# Patient Record
Sex: FEMALE | Race: WHITE | HISPANIC OR LATINO | Employment: FULL TIME | URBAN - METROPOLITAN AREA
[De-identification: names, ages, dates, MRNs, and addresses within clinical notes are randomized per-mention and may not be internally consistent; named-entity substitution may affect disease eponyms.]

---

## 2019-03-14 ENCOUNTER — ANNUAL EXAM (OUTPATIENT)
Dept: OBGYN CLINIC | Facility: CLINIC | Age: 59
End: 2019-03-14
Payer: COMMERCIAL

## 2019-03-14 VITALS
WEIGHT: 205 LBS | DIASTOLIC BLOOD PRESSURE: 74 MMHG | BODY MASS INDEX: 40.25 KG/M2 | HEIGHT: 60 IN | SYSTOLIC BLOOD PRESSURE: 126 MMHG

## 2019-03-14 DIAGNOSIS — Z12.31 ENCOUNTER FOR SCREENING MAMMOGRAM FOR MALIGNANT NEOPLASM OF BREAST: ICD-10-CM

## 2019-03-14 DIAGNOSIS — Z01.419 ENCNTR FOR GYN EXAM (GENERAL) (ROUTINE) W/O ABN FINDINGS: ICD-10-CM

## 2019-03-14 DIAGNOSIS — B36.9 FUNGAL DERMATITIS: Primary | ICD-10-CM

## 2019-03-14 PROBLEM — I10 HYPERTENSION: Status: ACTIVE | Noted: 2019-03-14

## 2019-03-14 PROBLEM — E11.9 DIABETES MELLITUS (HCC): Status: ACTIVE | Noted: 2019-03-14

## 2019-03-14 PROCEDURE — G0145 SCR C/V CYTO,THINLAYER,RESCR: HCPCS | Performed by: PHYSICIAN ASSISTANT

## 2019-03-14 PROCEDURE — 87624 HPV HI-RISK TYP POOLED RSLT: CPT | Performed by: PHYSICIAN ASSISTANT

## 2019-03-14 PROCEDURE — 99386 PREV VISIT NEW AGE 40-64: CPT | Performed by: PHYSICIAN ASSISTANT

## 2019-03-14 RX ORDER — AMLODIPINE BESYLATE 2.5 MG/1
2.5 TABLET ORAL DAILY
Refills: 0 | COMMUNITY
Start: 2018-12-18 | End: 2020-12-23 | Stop reason: SDUPTHER

## 2019-03-14 RX ORDER — LOSARTAN POTASSIUM AND HYDROCHLOROTHIAZIDE 25; 100 MG/1; MG/1
TABLET ORAL
COMMUNITY
End: 2020-09-09

## 2019-03-14 RX ORDER — NYSTATIN 100000 [USP'U]/G
POWDER TOPICAL 2 TIMES DAILY
Qty: 60 G | Refills: 0 | Status: SHIPPED | OUTPATIENT
Start: 2019-03-14 | End: 2020-03-18

## 2019-03-14 RX ORDER — NYSTATIN AND TRIAMCINOLONE ACETONIDE 100000; 1 [USP'U]/G; MG/G
OINTMENT TOPICAL 2 TIMES DAILY
Qty: 60 G | Refills: 3 | Status: SHIPPED | OUTPATIENT
Start: 2019-03-14 | End: 2019-03-14

## 2019-03-14 RX ORDER — NYSTATIN 100000 [USP'U]/G
POWDER TOPICAL 2 TIMES DAILY
Qty: 60 G | Refills: 3 | Status: SHIPPED | OUTPATIENT
Start: 2019-03-14 | End: 2019-03-14 | Stop reason: CLARIF

## 2019-03-14 RX ORDER — NYSTATIN AND TRIAMCINOLONE ACETONIDE 100000; 1 [USP'U]/G; MG/G
OINTMENT TOPICAL 2 TIMES DAILY
Qty: 60 G | Refills: 0 | Status: SHIPPED | OUTPATIENT
Start: 2019-03-14 | End: 2020-12-23

## 2019-03-14 RX ORDER — FLUCONAZOLE 150 MG/1
TABLET ORAL
Qty: 2 TABLET | Refills: 0 | Status: SHIPPED | OUTPATIENT
Start: 2019-03-14 | End: 2019-03-14

## 2019-03-14 RX ORDER — FLUCONAZOLE 150 MG/1
TABLET ORAL
Qty: 2 TABLET | Refills: 0 | Status: SHIPPED | OUTPATIENT
Start: 2019-03-14 | End: 2019-03-19

## 2019-03-14 NOTE — PROGRESS NOTES
Dominik Solis   1960    CC:  Yearly exam    S:  60 y o  female here for yearly exam  She is postmenopausal and has had no vaginal bleeding  She denies vaginal discharge, itching, odor or dryness  She is sexually active  She notes an itchy rash under her breasts and under her pannus and in her groin creases   She has diabetes and her numbers have been up lately     Last Pap 2/21/14 neg/neg  Last Mammo 9/30/16 neg  Last Colonoscopy 9 years ago       Current Outpatient Medications:     amLODIPine (NORVASC) 2 5 mg tablet, Take 2 5 mg by mouth daily, Disp: , Rfl: 0    cholecalciferol (VITAMIN D3) 1,000 units tablet, Take by mouth, Disp: , Rfl:     losartan-hydrochlorothiazide (HYZAAR) 100-25 MG per tablet, Take by mouth, Disp: , Rfl:     fluconazole (DIFLUCAN) 150 mg tablet, One po, repeat in 3 days, Disp: 2 tablet, Rfl: 0    nystatin (MYCOSTATIN) powder, Apply topically 2 (two) times a day, Disp: 60 g, Rfl: 3    nystatin-triamcinolone (MYCOLOG-II) ointment, Apply topically 2 (two) times a day, Disp: 60 g, Rfl: 3  Social History     Socioeconomic History    Marital status: /Civil Union     Spouse name: Not on file    Number of children: Not on file    Years of education: Not on file    Highest education level: Not on file   Occupational History    Not on file   Social Needs    Financial resource strain: Not on file    Food insecurity:     Worry: Not on file     Inability: Not on file    Transportation needs:     Medical: Not on file     Non-medical: Not on file   Tobacco Use    Smoking status: Never Smoker    Smokeless tobacco: Never Used   Substance and Sexual Activity    Alcohol use: Not Currently     Frequency: Never    Drug use: Never    Sexual activity: Yes     Partners: Male   Lifestyle    Physical activity:     Days per week: Not on file     Minutes per session: Not on file    Stress: Not on file   Relationships    Social connections:     Talks on phone: Not on file     Gets together: Not on file     Attends Yazidi service: Not on file     Active member of club or organization: Not on file     Attends meetings of clubs or organizations: Not on file     Relationship status: Not on file    Intimate partner violence:     Fear of current or ex partner: Not on file     Emotionally abused: Not on file     Physically abused: Not on file     Forced sexual activity: Not on file   Other Topics Concern    Not on file   Social History Narrative    Not on file     Family History   Problem Relation Age of Onset    Diabetes Mother      Past Medical History:   Diagnosis Date    [facility]     Hypertension         O:  Blood pressure 126/74, height 4' 11 84" (1 52 m), weight 93 kg (205 lb)  Patient appears well and is not in distress  Neck is supple without masses  Breasts are symmetrical without mass, tenderness, nipple discharge, skin changes or adenopathy  There is a fungal rash beneath both breasts  Abdomen is soft and nontender without masses  External genitals show a marked fungal rash of the vulva  Vagina is normal without discharge or bleeding  Cervix is normal without discharge or lesion  Uterus is normal, mobile, nontender without palpable mass  Adnexa are normal, nontender, without palpable mass  A:  Yearly exam  Fungal vulvitis  P:   Nystatin cream BID x 14 days til better, then switch to    Nystatin powder  Diflucan 150mg po x one dose,    repeat in 3 days  Call in 2 weeks if not resolved  Pap and HPV today     RTO one year for yearly exam or sooner as needed

## 2019-03-15 LAB
HPV HR 12 DNA CVX QL NAA+PROBE: NEGATIVE
HPV16 DNA CVX QL NAA+PROBE: NEGATIVE
HPV18 DNA CVX QL NAA+PROBE: NEGATIVE

## 2019-03-19 LAB
LAB AP GYN PRIMARY INTERPRETATION: NORMAL
Lab: NORMAL

## 2019-03-20 ENCOUNTER — TRANSCRIBE ORDERS (OUTPATIENT)
Dept: ADMISSIONS | Facility: HOSPITAL | Age: 59
End: 2019-03-20

## 2019-03-20 ENCOUNTER — HOSPITAL ENCOUNTER (OUTPATIENT)
Dept: RADIOLOGY | Facility: HOSPITAL | Age: 59
Discharge: HOME | End: 2019-03-20
Payer: COMMERCIAL

## 2019-03-20 VITALS — HEIGHT: 59 IN | BODY MASS INDEX: 41.33 KG/M2 | WEIGHT: 205 LBS

## 2019-03-20 DIAGNOSIS — Z12.31 ENCOUNTER FOR SCREENING MAMMOGRAM FOR MALIGNANT NEOPLASM OF BREAST: ICD-10-CM

## 2019-03-20 PROCEDURE — 77067 SCR MAMMO BI INCL CAD: CPT

## 2020-03-18 ENCOUNTER — ANNUAL EXAM (OUTPATIENT)
Dept: OBGYN CLINIC | Facility: CLINIC | Age: 60
End: 2020-03-18
Payer: COMMERCIAL

## 2020-03-18 VITALS
WEIGHT: 193 LBS | SYSTOLIC BLOOD PRESSURE: 124 MMHG | HEIGHT: 59 IN | DIASTOLIC BLOOD PRESSURE: 80 MMHG | BODY MASS INDEX: 38.91 KG/M2

## 2020-03-18 DIAGNOSIS — B36.9 FUNGAL DERMATITIS: Primary | ICD-10-CM

## 2020-03-18 DIAGNOSIS — Z01.419 ENCNTR FOR GYN EXAM (GENERAL) (ROUTINE) W/O ABN FINDINGS: ICD-10-CM

## 2020-03-18 DIAGNOSIS — Z12.31 ENCOUNTER FOR SCREENING MAMMOGRAM FOR MALIGNANT NEOPLASM OF BREAST: ICD-10-CM

## 2020-03-18 PROBLEM — J45.909 ASTHMA: Status: ACTIVE | Noted: 2020-03-18

## 2020-03-18 PROBLEM — J30.9 ALLERGIC RHINITIS: Status: ACTIVE | Noted: 2020-03-18

## 2020-03-18 PROBLEM — D56.9 THALASSEMIA: Status: ACTIVE | Noted: 2020-03-18

## 2020-03-18 PROBLEM — E78.5 HYPERLIPIDEMIA: Status: ACTIVE | Noted: 2020-03-18

## 2020-03-18 PROBLEM — E55.9 VITAMIN D DEFICIENCY: Status: ACTIVE | Noted: 2020-03-18

## 2020-03-18 PROBLEM — D86.9 SARCOIDOSIS: Status: ACTIVE | Noted: 2020-03-18

## 2020-03-18 PROCEDURE — 99396 PREV VISIT EST AGE 40-64: CPT | Performed by: PHYSICIAN ASSISTANT

## 2020-03-18 RX ORDER — EMPAGLIFLOZIN 10 MG/1
TABLET, FILM COATED ORAL
COMMUNITY
Start: 2020-03-02 | End: 2020-11-09

## 2020-03-18 RX ORDER — FLUCONAZOLE 150 MG/1
TABLET ORAL
Qty: 2 TABLET | Refills: 0 | Status: SHIPPED | OUTPATIENT
Start: 2020-03-18 | End: 2020-03-21

## 2020-03-18 RX ORDER — NYSTATIN 100000 [USP'U]/G
POWDER TOPICAL 3 TIMES DAILY
Qty: 60 G | Refills: 3 | Status: SHIPPED | OUTPATIENT
Start: 2020-03-18 | End: 2020-12-23

## 2020-03-18 RX ORDER — SITAGLIPTIN AND METFORMIN HYDROCHLORIDE 500; 50 MG/1; MG/1
TABLET, FILM COATED ORAL
COMMUNITY
Start: 2020-02-12 | End: 2020-12-23 | Stop reason: SDUPTHER

## 2020-03-18 NOTE — PROGRESS NOTES
Asmita Daniel   1960    CC:  Yearly exam    S:  60 y o  female here for yearly exam  She is postmenopausal and has had no vaginal bleeding  She denies vaginal discharge, itching, odor or dryness  She does report a rash in her groin  Sexual activity: She is sexually active without pain, bleeding or dryness  Last Pap: 3/14/19 neg/neg  Last Mammo:  3/20/19 neg  Last Colonoscopy:  2014 neg (repeat 2024)      We reviewed [facility] guidelines for Pap testing       Family hx of breast cancer: no  Family hx of ovarian cancer: no  Family hx of colon cancer: no    Current Outpatient Medications:     amLODIPine (NORVASC) 2 5 mg tablet, Take 2 5 mg by mouth daily, Disp: , Rfl: 0    cholecalciferol (VITAMIN D3) 1,000 units tablet, Take by mouth, Disp: , Rfl:     JANUMET  MG per tablet, , Disp: , Rfl:     JARDIANCE 10 MG TABS, , Disp: , Rfl:     losartan-hydrochlorothiazide (HYZAAR) 100-25 MG per tablet, Take by mouth, Disp: , Rfl:     nystatin (MYCOSTATIN) powder, Apply topically 2 (two) times a day, Disp: 60 g, Rfl: 0    nystatin-triamcinolone (MYCOLOG-II) ointment, Apply topically 2 (two) times a day, Disp: 60 g, Rfl: 0  Social History     Socioeconomic History    Marital status: /Civil Union     Spouse name: Not on file    Number of children: Not on file    Years of education: Not on file    Highest education level: Not on file   Occupational History    Not on file   Social Needs    Financial resource strain: Not on file    Food insecurity:     Worry: Not on file     Inability: Not on file    Transportation needs:     Medical: Not on file     Non-medical: Not on file   Tobacco Use    Smoking status: Never Smoker    Smokeless tobacco: Never Used   Substance and Sexual Activity    Alcohol use: Not Currently     Frequency: Never    Drug use: Never    Sexual activity: Yes     Partners: Male   Lifestyle    Physical activity:     Days per week: Not on file     Minutes per session: Not on in 3 days   Nystatin powder TID     RTO one year for yearly exam or sooner as needed

## 2020-04-03 NOTE — PROGRESS NOTES
Working over due bin, COVID-19  Mailed mammogram order to patient as a friendly reminder due, with the number to central scheduling, informing pt she may schedule out till June 2020

## 2020-09-09 DIAGNOSIS — I10 ESSENTIAL HYPERTENSION: Primary | ICD-10-CM

## 2020-09-09 RX ORDER — LOSARTAN POTASSIUM AND HYDROCHLOROTHIAZIDE 25; 100 MG/1; MG/1
TABLET ORAL
Qty: 90 TABLET | Refills: 0 | Status: SHIPPED | OUTPATIENT
Start: 2020-09-09 | End: 2020-12-23 | Stop reason: SDUPTHER

## 2020-09-10 ENCOUNTER — TELEMEDICINE (OUTPATIENT)
Dept: INTERNAL MEDICINE CLINIC | Facility: CLINIC | Age: 60
End: 2020-09-10
Payer: COMMERCIAL

## 2020-09-10 DIAGNOSIS — Z20.828 EXPOSURE TO SARS-ASSOCIATED CORONAVIRUS: Primary | ICD-10-CM

## 2020-09-10 PROCEDURE — 99442 PR PHYS/QHP TELEPHONE EVALUATION 11-20 MIN: CPT | Performed by: INTERNAL MEDICINE

## 2020-09-10 NOTE — PROGRESS NOTES
Virtual Brief Visit    Assessment/Plan:    Problem List Items Addressed This Visit        Other    Exposure to SARS-associated coronavirus - Primary                Reason for visit is   Chief Complaint   Patient presents with    Virtual Brief Visit        Encounter provider Nina Hilario MD    Provider located at [address] [address] [phone]    Recent Visits  No visits were found meeting these conditions  Showing recent visits within past 7 days and meeting all other requirements     Today's Visits  Date Type Provider Dept   09[facility]10/[facility] [address] [place] [address] today's visits and meeting all other requirements     Future Appointments  No visits were found meeting these conditions  Showing future appointments within next 150 days and meeting all other requirements        After connecting through telephone, the patient was identified by name and date of birth  Mateo Godinez was informed that this is a telemedicine visit and that the visit is being conducted through [facility] and patient was informed that this is a secure, HIPAA-compliant platform  She agrees to proceed     My office door was closed  No one else was in the room  She acknowledged consent and understanding of privacy and security of the platform  The patient has agreed to participate and understands she can discontinue the visit at any time  Patient is aware this is a billable service  Subjective    Mateo Godinez is a 63 y o  female     Exposed to COVID-19, got tested, waiting for report, she is asymptomatic       Past Medical History:   Diagnosis Date    Diabetes mellitus ([address] )     Hypertension        Past Surgical History:   Procedure Laterality Date    COLONOSCOPY         Current Outpatient Medications   Medication Sig Dispense Refill    amLODIPine (NORVASC) 2 5 mg tablet Take 2 5 mg by mouth daily  0    cholecalciferol (VITAMIN D3) 1,000 units tablet Take by mouth      JANUMET  MG per tablet       JARDIANCE 10 MG TABS       losartan-hydrochlorothiazide (HYZAAR) 100-25 MG per tablet TAKE ONE TABLET BY MOUTH DAILY 90 tablet 0    nystatin (MYCOSTATIN) powder Apply topically 3 (three) times a day 60 g 3    nystatin-triamcinolone (MYCOLOG-II) ointment Apply topically 2 (two) times a day 60 g 0     No current facility-administered medications for this visit  Allergies   Allergen Reactions    Sulfa Antibiotics        Review of Systems   Constitutional: Negative for chills and fever  HENT: Negative for congestion, ear pain and sore throat  Eyes: Negative for pain  Respiratory: Negative for cough and shortness of breath  Cardiovascular: Negative for chest pain and leg swelling  Gastrointestinal: Negative for abdominal pain, nausea and vomiting  Endocrine: Negative for polyuria  Genitourinary: Negative for difficulty urinating, frequency and urgency  Musculoskeletal: Negative for arthralgias and back pain  Skin: Negative for rash  Neurological: Negative for weakness and headaches  Psychiatric/Behavioral: Negative for sleep disturbance  The patient is not nervous/anxious  There were no vitals filed for this visit  I spent 15 minutes directly with the patient during this visit    VIRTUAL VISIT DISCLAIMER    True Bright acknowledges that she has consented to an online visit or consultation  She understands that the online visit is based solely on information provided by her, and that, in the absence of a face-to-face physical evaluation by the physician, the diagnosis she receives is both limited and provisional in terms of accuracy and completeness  This is not intended to replace a full medical face-to-face evaluation by the physician  True Bright understands and accepts these terms

## 2020-11-02 DIAGNOSIS — Z79.4 TYPE 2 DIABETES MELLITUS WITHOUT COMPLICATION, WITH LONG-TERM CURRENT USE OF INSULIN (HCC): Primary | ICD-10-CM

## 2020-11-02 DIAGNOSIS — E11.9 TYPE 2 DIABETES MELLITUS WITHOUT COMPLICATION, WITH LONG-TERM CURRENT USE OF INSULIN (HCC): Primary | ICD-10-CM

## 2020-11-04 ENCOUNTER — TELEPHONE (OUTPATIENT)
Dept: INTERNAL MEDICINE CLINIC | Facility: CLINIC | Age: 60
End: 2020-11-04

## 2020-11-09 RX ORDER — EMPAGLIFLOZIN 10 MG/1
TABLET, FILM COATED ORAL
Qty: 30 TABLET | Refills: 2 | Status: SHIPPED | OUTPATIENT
Start: 2020-11-09 | End: 2020-12-23 | Stop reason: SDUPTHER

## 2020-12-23 ENCOUNTER — OFFICE VISIT (OUTPATIENT)
Dept: INTERNAL MEDICINE CLINIC | Facility: CLINIC | Age: 60
End: 2020-12-23
Payer: COMMERCIAL

## 2020-12-23 VITALS
TEMPERATURE: 98.2 F | HEART RATE: 98 BPM | SYSTOLIC BLOOD PRESSURE: 122 MMHG | OXYGEN SATURATION: 98 % | HEIGHT: 58 IN | DIASTOLIC BLOOD PRESSURE: 90 MMHG | WEIGHT: 191 LBS | BODY MASS INDEX: 40.09 KG/M2

## 2020-12-23 DIAGNOSIS — B37.3 YEAST INFECTION INVOLVING THE VAGINA AND SURROUNDING AREA: ICD-10-CM

## 2020-12-23 DIAGNOSIS — Z11.59 NEED FOR HEPATITIS C SCREENING TEST: ICD-10-CM

## 2020-12-23 DIAGNOSIS — L30.9 ECZEMA, UNSPECIFIED TYPE: ICD-10-CM

## 2020-12-23 DIAGNOSIS — E11.9 TYPE 2 DIABETES MELLITUS WITHOUT COMPLICATION, WITH LONG-TERM CURRENT USE OF INSULIN (HCC): Primary | ICD-10-CM

## 2020-12-23 DIAGNOSIS — D86.9 SARCOIDOSIS: ICD-10-CM

## 2020-12-23 DIAGNOSIS — J30.9 ALLERGIC RHINITIS, UNSPECIFIED SEASONALITY, UNSPECIFIED TRIGGER: ICD-10-CM

## 2020-12-23 DIAGNOSIS — Z79.4 TYPE 2 DIABETES MELLITUS WITHOUT COMPLICATION, WITH LONG-TERM CURRENT USE OF INSULIN (HCC): Primary | ICD-10-CM

## 2020-12-23 DIAGNOSIS — I10 ESSENTIAL HYPERTENSION: ICD-10-CM

## 2020-12-23 DIAGNOSIS — J45.40 MODERATE PERSISTENT ASTHMA WITHOUT COMPLICATION: ICD-10-CM

## 2020-12-23 DIAGNOSIS — K59.09 OTHER CONSTIPATION: ICD-10-CM

## 2020-12-23 DIAGNOSIS — E55.9 VITAMIN D DEFICIENCY: ICD-10-CM

## 2020-12-23 DIAGNOSIS — Z23 NEED FOR TETANUS BOOSTER: ICD-10-CM

## 2020-12-23 PROCEDURE — 3074F SYST BP LT 130 MM HG: CPT | Performed by: INTERNAL MEDICINE

## 2020-12-23 PROCEDURE — 1036F TOBACCO NON-USER: CPT | Performed by: INTERNAL MEDICINE

## 2020-12-23 PROCEDURE — 90471 IMMUNIZATION ADMIN: CPT

## 2020-12-23 PROCEDURE — 3008F BODY MASS INDEX DOCD: CPT | Performed by: INTERNAL MEDICINE

## 2020-12-23 PROCEDURE — 90715 TDAP VACCINE 7 YRS/> IM: CPT

## 2020-12-23 PROCEDURE — 3080F DIAST BP >= 90 MM HG: CPT | Performed by: INTERNAL MEDICINE

## 2020-12-23 PROCEDURE — 99214 OFFICE O/P EST MOD 30 MIN: CPT | Performed by: INTERNAL MEDICINE

## 2020-12-23 RX ORDER — VERAPAMIL HYDROCHLORIDE 240 MG/1
240 CAPSULE, EXTENDED RELEASE ORAL
Qty: 30 CAPSULE | Refills: 3 | Status: SHIPPED | OUTPATIENT
Start: 2020-12-23 | End: 2021-03-30

## 2020-12-23 RX ORDER — AMLODIPINE BESYLATE 2.5 MG/1
2.5 TABLET ORAL DAILY
Qty: 30 TABLET | Refills: 2 | Status: SHIPPED | OUTPATIENT
Start: 2020-12-23 | End: 2021-04-21

## 2020-12-23 RX ORDER — EMPAGLIFLOZIN 10 MG/1
10 TABLET, FILM COATED ORAL EVERY MORNING
Qty: 30 TABLET | Refills: 2 | Status: SHIPPED | OUTPATIENT
Start: 2020-12-23 | End: 2021-03-02 | Stop reason: SDUPTHER

## 2020-12-23 RX ORDER — TRIAMCINOLONE ACETONIDE 1 MG/G
CREAM TOPICAL 2 TIMES DAILY
Qty: 30 G | Refills: 0 | Status: SHIPPED | OUTPATIENT
Start: 2020-12-23 | End: 2021-04-21

## 2020-12-23 RX ORDER — LOSARTAN POTASSIUM AND HYDROCHLOROTHIAZIDE 25; 100 MG/1; MG/1
1 TABLET ORAL DAILY
Qty: 90 TABLET | Refills: 1 | Status: SHIPPED | OUTPATIENT
Start: 2020-12-23 | End: 2021-08-12

## 2020-12-23 RX ORDER — SITAGLIPTIN AND METFORMIN HYDROCHLORIDE 500; 50 MG/1; MG/1
1 TABLET, FILM COATED ORAL 2 TIMES DAILY WITH MEALS
Qty: 30 TABLET | Refills: 3 | Status: SHIPPED | OUTPATIENT
Start: 2020-12-23 | End: 2021-03-30 | Stop reason: SDUPTHER

## 2020-12-23 RX ORDER — VERAPAMIL HYDROCHLORIDE 240 MG/1
240 CAPSULE, EXTENDED RELEASE ORAL
COMMUNITY
End: 2020-12-23 | Stop reason: SDUPTHER

## 2020-12-23 RX ORDER — CLOTRIMAZOLE 1 %
CREAM (GRAM) TOPICAL 2 TIMES DAILY
Qty: 30 G | Refills: 0 | Status: SHIPPED | OUTPATIENT
Start: 2020-12-23 | End: 2021-04-21

## 2021-03-02 DIAGNOSIS — E11.9 TYPE 2 DIABETES MELLITUS WITHOUT COMPLICATION, WITH LONG-TERM CURRENT USE OF INSULIN (HCC): ICD-10-CM

## 2021-03-02 DIAGNOSIS — Z79.4 TYPE 2 DIABETES MELLITUS WITHOUT COMPLICATION, WITH LONG-TERM CURRENT USE OF INSULIN (HCC): ICD-10-CM

## 2021-03-02 RX ORDER — EMPAGLIFLOZIN 10 MG/1
10 TABLET, FILM COATED ORAL EVERY MORNING
Qty: 30 TABLET | Refills: 2 | Status: SHIPPED | OUTPATIENT
Start: 2021-03-02 | End: 2021-03-30 | Stop reason: SDUPTHER

## 2021-03-16 ENCOUNTER — TELEPHONE (OUTPATIENT)
Dept: INTERNAL MEDICINE CLINIC | Facility: CLINIC | Age: 61
End: 2021-03-16

## 2021-03-16 NOTE — TELEPHONE ENCOUNTER
Pt's jardiance is not being covered, do you want to switch to something different or do prior Blanquita Colon

## 2021-03-16 NOTE — TELEPHONE ENCOUNTER
Pt's daughter would like to be called back once we received response,     Meghna Shepard [phone]

## 2021-03-30 ENCOUNTER — OFFICE VISIT (OUTPATIENT)
Dept: INTERNAL MEDICINE CLINIC | Facility: CLINIC | Age: 61
End: 2021-03-30
Payer: COMMERCIAL

## 2021-03-30 VITALS
TEMPERATURE: 97.7 F | DIASTOLIC BLOOD PRESSURE: 84 MMHG | SYSTOLIC BLOOD PRESSURE: 136 MMHG | WEIGHT: 188 LBS | HEART RATE: 92 BPM | BODY MASS INDEX: 39.47 KG/M2 | HEIGHT: 58 IN

## 2021-03-30 DIAGNOSIS — I83.813 VARICOSE VEINS OF BOTH LOWER EXTREMITIES WITH PAIN: ICD-10-CM

## 2021-03-30 DIAGNOSIS — E55.9 VITAMIN D DEFICIENCY: ICD-10-CM

## 2021-03-30 DIAGNOSIS — Z12.31 ENCOUNTER FOR SCREENING MAMMOGRAM FOR MALIGNANT NEOPLASM OF BREAST: ICD-10-CM

## 2021-03-30 DIAGNOSIS — Z12.11 ENCOUNTER FOR SCREENING COLONOSCOPY: ICD-10-CM

## 2021-03-30 DIAGNOSIS — E11.9 TYPE 2 DIABETES MELLITUS WITHOUT COMPLICATION, WITH LONG-TERM CURRENT USE OF INSULIN (HCC): ICD-10-CM

## 2021-03-30 DIAGNOSIS — D86.9 SARCOIDOSIS: ICD-10-CM

## 2021-03-30 DIAGNOSIS — J45.40 MODERATE PERSISTENT ASTHMA WITHOUT COMPLICATION: ICD-10-CM

## 2021-03-30 DIAGNOSIS — Z79.4 TYPE 2 DIABETES MELLITUS WITHOUT COMPLICATION, WITH LONG-TERM CURRENT USE OF INSULIN (HCC): ICD-10-CM

## 2021-03-30 DIAGNOSIS — E11.65 UNCONTROLLED TYPE 2 DIABETES MELLITUS WITH HYPERGLYCEMIA (HCC): Primary | ICD-10-CM

## 2021-03-30 DIAGNOSIS — E78.2 MIXED HYPERLIPIDEMIA: ICD-10-CM

## 2021-03-30 DIAGNOSIS — I10 ESSENTIAL HYPERTENSION, BENIGN: ICD-10-CM

## 2021-03-30 PROCEDURE — 99214 OFFICE O/P EST MOD 30 MIN: CPT | Performed by: INTERNAL MEDICINE

## 2021-03-30 RX ORDER — EMPAGLIFLOZIN 10 MG/1
10 TABLET, FILM COATED ORAL EVERY MORNING
Qty: 30 TABLET | Refills: 2 | Status: SHIPPED | OUTPATIENT
Start: 2021-03-30 | End: 2021-09-29 | Stop reason: SDUPTHER

## 2021-03-30 RX ORDER — VERAPAMIL HYDROCHLORIDE 240 MG/1
240 CAPSULE, EXTENDED RELEASE ORAL
COMMUNITY
End: 2021-09-29 | Stop reason: SDUPTHER

## 2021-03-30 RX ORDER — SITAGLIPTIN AND METFORMIN HYDROCHLORIDE 500; 50 MG/1; MG/1
1 TABLET, FILM COATED ORAL 2 TIMES DAILY WITH MEALS
Qty: 30 TABLET | Refills: 3 | Status: SHIPPED | OUTPATIENT
Start: 2021-03-30 | End: 2021-04-07 | Stop reason: SDUPTHER

## 2021-03-30 NOTE — PROGRESS NOTES
Assessment/Plan:    BMI Counseling: Body mass index is 39 29 kg/m²  The BMI is above normal  Nutrition recommendations include decreasing portion sizes, encouraging healthy choices of fruits and vegetables and decreasing fast food intake  Exercise recommendations include moderate physical activity 150 minutes/week  1  Uncontrolled type 2 diabetes mellitus with hyperglycemia ([facility] )  -     Ambulatory referral to Weight Management; Future  -     CBC and differential; Future  -     Hemoglobin A1C; Future  -     Comprehensive metabolic panel; Future  -     Lipid Panel with Direct LDL reflex; Future  -     Microalbumin / creatinine urine ratio  -     TSH, 3rd generation; Future    2  Encounter for screening mammogram for malignant neoplasm of breast  -     Mammo screening bilateral w 3d & cad; Future; Expected date: 03/30/2021    3  Encounter for screening colonoscopy  -     Ambulatory referral for colonoscopy; Future    4  Type 2 diabetes mellitus without complication, with long-term current use of insulin ([facility] )  -     Ambulatory referral to Ophthalmology; Future  -     Empagliflozin (Jardiance) 10 MG TABS; Take 1 tablet (10 mg total) by mouth every morning  -     Janumet  MG per tablet; Take 1 tablet by mouth 2 (two) times a day with meals    5  Mixed hyperlipidemia    6  Sarcoidosis    7  Moderate persistent asthma without complication    8  Vitamin D deficiency    9  Varicose veins of both lower extremities with pain  -     Ambulatory referral to Vascular Surgery; Future    10  Body mass index 39 0-39 9, adult  -     Ambulatory referral to Weight Management; Future    11  Essential hypertension, benign  -     Ambulatory referral to Weight Management; Future           Subjective:      Patient ID: Jong Taylor is a 63 y o  female      Follow-up on multiple medical problems to ensure they are stable on current medication      The following portions of the patient's history were reviewed and updated as appropriate: She  has a past medical history of Diabetes mellitus ([facility] ), Hypercholesterolemia, Hypertension, Rheumatoid arthritis ([facility] ), and Sarcoidosis  She   Patient Active Problem List    Diagnosis Date Noted    Varicose veins of both lower extremities with pain 03/30/2021    Body mass index 39 0-39 9, adult 03/30/2021    Uncontrolled type 2 diabetes mellitus with hyperglycemia ([facility] ) 03/30/2021    Encounter for screening mammogram for malignant neoplasm of breast 03/30/2021    Encounter for screening colonoscopy 03/30/2021    Moderate persistent asthma without complication 68/64/8623    Exposure to SARS-associated coronavirus 09/10/2020    Allergic rhinitis 03/18/2020    Asthma 03/18/2020    Hyperlipidemia 03/18/2020    Sarcoidosis 03/18/2020    Thalassemia 03/18/2020    Vitamin D deficiency 03/18/2020    Diabetes mellitus ([facility] ) 03/14/2019    Hypertension 03/14/2019     She  has a past surgical history that includes Colonoscopy (05/15/2009); Cholecystectomy; and Mammo (historical) (09/30/2016)  Her family history includes Coronary artery disease in her father; Diabetes in her maternal grandmother and mother; Stomach cancer in her maternal grandfather  She  reports that she has never smoked  She has never used smokeless tobacco  She reports previous alcohol use  She reports that she does not use drugs    Current Outpatient Medications   Medication Sig Dispense Refill    amLODIPine (NORVASC) 2 5 mg tablet Take 1 tablet (2 5 mg total) by mouth daily 30 tablet 2    Empagliflozin (Jardiance) 10 MG TABS Take 1 tablet (10 mg total) by mouth every morning 30 tablet 2    Janumet  MG per tablet Take 1 tablet by mouth 2 (two) times a day with meals 30 tablet 3    losartan-hydrochlorothiazide (HYZAAR) 100-25 MG per tablet Take 1 tablet by mouth daily 90 tablet 1    verapamil (VERELAN) 240 MG 24 hr capsule Take 240 mg by mouth daily at bedtime      clotrimazole (LOTRIMIN) 1 % cream Apply topically 2 (two) times a day (Patient not taking: Reported on 3/30/2021) 30 g 0    psyllium (METAMUCIL SMOOTH TEXTURE) 28 % packet Take 1 packet by mouth 2 (two) times a day (Patient not taking: Reported on 3/30/2021) 60 packet 5    triamcinolone (KENALOG) 0 1 % cream Apply topically 2 (two) times a day (Patient not taking: Reported on 3/30/2021) 30 g 0     No current facility-administered medications for this visit  Current Outpatient Medications on File Prior to Visit   Medication Sig    amLODIPine (NORVASC) 2 5 mg tablet Take 1 tablet (2 5 mg total) by mouth daily    losartan-hydrochlorothiazide (HYZAAR) 100-25 MG per tablet Take 1 tablet by mouth daily    verapamil (VERELAN) 240 MG 24 hr capsule Take 240 mg by mouth daily at bedtime    [DISCONTINUED] Empagliflozin (Jardiance) 10 MG TABS Take 1 tablet (10 mg total) by mouth every morning    [DISCONTINUED] Janumet  MG per tablet Take 1 tablet by mouth 2 (two) times a day with meals    clotrimazole (LOTRIMIN) 1 % cream Apply topically 2 (two) times a day (Patient not taking: Reported on 3/30/2021)    psyllium (METAMUCIL SMOOTH TEXTURE) 28 % packet Take 1 packet by mouth 2 (two) times a day (Patient not taking: Reported on 3/30/2021)    triamcinolone (KENALOG) 0 1 % cream Apply topically 2 (two) times a day (Patient not taking: Reported on 3/30/2021)    [DISCONTINUED] verapamil (VERELAN) 240 MG 24 hr capsule Take 1 capsule (240 mg total) by mouth daily at bedtime (Patient not taking: Reported on 3/30/2021)     No current facility-administered medications on file prior to visit  She is allergic to sulfa antibiotics       Review of Systems   Constitutional: Negative for chills and fever  HENT: Negative for congestion, ear pain and sore throat  Eyes: Negative for pain  Respiratory: Negative for cough and shortness of breath  Cardiovascular: Negative for chest pain and leg swelling     Gastrointestinal: Negative for abdominal pain, nausea and vomiting  Endocrine: Negative for polyuria  Genitourinary: Negative for difficulty urinating, frequency and urgency  Musculoskeletal: Positive for arthralgias  Negative for back pain  Skin: Negative for rash  Neurological: Negative for weakness and headaches  Psychiatric/Behavioral: Negative for sleep disturbance  The patient is not nervous/anxious  Objective:      /84 (BP Location: Left arm, Patient Position: Sitting)   Pulse 92   Temp 97 7 °F (36 5 °C)   Ht 4' 10" (1 473 m)   Wt 85 3 kg (188 lb)   BMI 39 29 kg/m²     No results found for this or any previous visit (from the past 1344 hour(s))  Physical Exam  Constitutional:       Appearance: Normal appearance  HENT:      Head: Normocephalic  Right Ear: Tympanic membrane, ear canal and external ear normal       Left Ear: Tympanic membrane, ear canal and external ear normal       Nose: Nose normal  No congestion  Mouth/Throat:      Mouth: Mucous membranes are moist       Pharynx: Oropharynx is clear  No oropharyngeal exudate or posterior oropharyngeal erythema  Eyes:      Extraocular Movements: Extraocular movements intact  Conjunctiva/sclera: Conjunctivae normal       Pupils: Pupils are equal, round, and reactive to light  Neck:      Musculoskeletal: Normal range of motion and neck supple  Cardiovascular:      Rate and Rhythm: Normal rate and regular rhythm  Heart sounds: Normal heart sounds  No murmur  Pulmonary:      Effort: Pulmonary effort is normal       Breath sounds: Normal breath sounds  No wheezing or rales  Abdominal:      General: Bowel sounds are normal  There is no distension  Palpations: Abdomen is soft  Tenderness: There is no abdominal tenderness  Musculoskeletal: Normal range of motion  Right lower leg: No edema  Left lower leg: No edema  Lymphadenopathy:      Cervical: No cervical adenopathy  Skin:     General: Skin is warm     Neurological:      General: No focal deficit present  Mental Status: She is alert and oriented to person, place, and time

## 2021-04-07 ENCOUNTER — TELEPHONE (OUTPATIENT)
Dept: INTERNAL MEDICINE CLINIC | Facility: CLINIC | Age: 61
End: 2021-04-07

## 2021-04-07 DIAGNOSIS — E11.9 TYPE 2 DIABETES MELLITUS WITHOUT COMPLICATION, WITH LONG-TERM CURRENT USE OF INSULIN (HCC): ICD-10-CM

## 2021-04-07 DIAGNOSIS — Z79.4 TYPE 2 DIABETES MELLITUS WITHOUT COMPLICATION, WITH LONG-TERM CURRENT USE OF INSULIN (HCC): ICD-10-CM

## 2021-04-07 RX ORDER — SITAGLIPTIN AND METFORMIN HYDROCHLORIDE 500; 50 MG/1; MG/1
1 TABLET, FILM COATED ORAL 2 TIMES DAILY WITH MEALS
Qty: 60 TABLET | Refills: 3 | Status: SHIPPED | OUTPATIENT
Start: 2021-04-07 | End: 2021-09-29 | Stop reason: SDUPTHER

## 2021-04-07 NOTE — TELEPHONE ENCOUNTER
PT's  walked into office stating Elisabeth Eckert was sent to the pharmacy for only a 15 day supply  She needs it to be sent as a 30 day supply

## 2021-04-08 ENCOUNTER — HOSPITAL ENCOUNTER (OUTPATIENT)
Dept: RADIOLOGY | Age: 61
Discharge: HOME | End: 2021-04-08
Payer: COMMERCIAL

## 2021-04-08 VITALS — BODY MASS INDEX: 39.88 KG/M2 | WEIGHT: 190 LBS | HEIGHT: 58 IN

## 2021-04-08 DIAGNOSIS — Z12.31 ENCOUNTER FOR SCREENING MAMMOGRAM FOR MALIGNANT NEOPLASM OF BREAST: ICD-10-CM

## 2021-04-08 PROCEDURE — 77063 BREAST TOMOSYNTHESIS BI: CPT

## 2021-04-08 PROCEDURE — 77067 SCR MAMMO BI INCL CAD: CPT

## 2021-04-09 NOTE — TELEPHONE ENCOUNTER
When patient was here to see Dr Ender Jackson I did ask pt and her grand daughter if they were sure they wanted to see Dr Ender Jackson and that is was not Dr Linn Otto here and the grand daughter said "she is changing doctors today"

## 2021-04-16 LAB
CREAT ?TM UR-SCNC: 167 UMOL/L
EXT MICROALBUMIN URINE RANDOM: 4.9
HBA1C MFR BLD HPLC: 11.9 %
MICROALBUMIN/CREAT UR: 29.3 MG/G{CREAT}

## 2021-04-20 ENCOUNTER — CONSULT (OUTPATIENT)
Dept: VASCULAR SURGERY | Facility: CLINIC | Age: 61
End: 2021-04-20
Payer: COMMERCIAL

## 2021-04-20 VITALS
HEIGHT: 58 IN | SYSTOLIC BLOOD PRESSURE: 126 MMHG | BODY MASS INDEX: 39.88 KG/M2 | WEIGHT: 190 LBS | HEART RATE: 108 BPM | TEMPERATURE: 97 F | DIASTOLIC BLOOD PRESSURE: 88 MMHG

## 2021-04-20 DIAGNOSIS — I83.813 VARICOSE VEINS OF BOTH LOWER EXTREMITIES WITH PAIN: ICD-10-CM

## 2021-04-20 PROCEDURE — 3079F DIAST BP 80-89 MM HG: CPT | Performed by: NURSE PRACTITIONER

## 2021-04-20 PROCEDURE — 99203 OFFICE O/P NEW LOW 30 MIN: CPT | Performed by: NURSE PRACTITIONER

## 2021-04-20 PROCEDURE — 3074F SYST BP LT 130 MM HG: CPT | Performed by: NURSE PRACTITIONER

## 2021-04-20 NOTE — PATIENT INSTRUCTIONS
Varicose Veins   AMBULATORY CARE:   Varicose veins  are veins that become large, twisted, and swollen  They are common on the back of the calves, knees, and thighs  Varicose veins are caused by valves in your veins that do not work properly  This causes blood to collect and increase pressure in the veins of your legs  The increased pressure causes your veins to stretch, get larger, swell, and twist   Common symptoms include the following: Your symptoms may be worse after you stand or sit for long periods of time  You may have any of the following:  · Blue, purple, or bulging veins in your legs     · Pain, swelling, or muscle cramps in your legs    · Feeling of fatigue or heaviness in your legs    · Cramping in your legs    Seek care immediately if:   · You have a wound that does not heal or is infected  · You have an injury that has broken your skin and caused your varicose veins to bleed  · Your leg is swollen and hard  · You notice that your legs or feet are turning blue or black  · Your leg feels warm, tender, and painful  It may look swollen and red  Contact your healthcare provider if:   · You have pain in your leg that does not go away or gets worse  · You notice sudden large bruising on your legs  · You have a rash on your leg  · Your symptoms keep you from doing your daily activities  · You have questions or concerns about your condition or care  Treatment of varicose veins  aims to decrease symptoms, improve appearance, and prevent further problems  Treatment will depend on which veins are affected and how severe your condition is  You may need procedures to treat or remove your varicose veins  For example, your healthcare provider may inject a solution or use a laser to close the varicose veins  Surgery to remove long veins may also be done  Ask your healthcare provider for more information about procedures used to treat varicose veins    Manage varicose veins:   · Do not sit or stand for long periods of time  This can cause the blood to collect in your legs and make your symptoms worse  Bend or rotate your ankles several times every hour  Walk around for a few minutes every hour to get blood moving in your legs  · Do not cross your legs when you sit  This decreases blood flow to your feet and can make your symptoms worse  · Do not wear tight clothing or shoes  Do not wear high-heeled shoes  Do not wear clothes that are tight around the waist or knees  · Maintain a healthy weight  Being overweight or obese can make your varicose veins worse  Ask your healthcare provider how much you should weigh  Ask him or her to help you create a weight loss plan if you are overweight  · Wear pressure stockings as directed  The stockings are tight and put pressure on your legs  They improve blood flow and help prevent clots  · Elevate your legs  Keep them above the level of your heart for 15 to 30 minutes several times a day  You can also prop the end of your bed up slightly to elevate your legs while you sleep  This will help blood to flow back to your heart  · Get regular exercise  Talk to your healthcare provider about the best exercise plan for you  Exercise can improve blood flow to your legs and feet  Follow up with your healthcare provider as directed:  Write down your questions so you remember to ask them during your visits  © Copyright [address] Information is for End User's use only and may not be sold, redistributed or otherwise used for commercial purposes  All illustrations and images included in CareNotes® are the copyrighted property of [employer]  or [address] [place] [address] Emily   The above information is an  only  It is not intended as medical advice for individual conditions or treatments  Talk to your doctor, nurse or pharmacist before following any medical regimen to see if it is safe and effective for you

## 2021-04-20 NOTE — PROGRESS NOTES
Assessment/Plan:          Problem List Items Addressed This Visit        Cardiovascular and Mediastinum    Varicose veins of both lower extremities with pain     71-year-old female with HTN, HLD, DM, bilateral lower extremity varicosities referred for vascular evaluation   -Bilateral posterior popliteal fossa and anterior shin truncal varicosities with heaviness, throbbing, discomfort   -We discussed physiology of venous disease, available treatment options and indications for treatment  -Recommended trial of conservative measures which includes the daily use of gradient compression hose, periodic leg elevation and regular exercise   -Rx 20-30 mmHg compression socks given   -Will evaluate with venous reflux study in 3 months   -Return to the office with vascular surgeon to review study and discuss treatment options         Relevant Orders    Compression Stocking    Compression Stocking    VAS reflux lower limb venous duplex study with reflux assessment, complete bilateral    Assistive Device (stocking application)            Subjective:      Patient ID: Carrie Arredondo is a 63 y o  female     New patient presents in office for Varicose Veins who was referred by Olena Wilkerson MD  Patient had no prior testing  Patient c o of having varicose veins of both legs with pain  Pt states there is b/l swelling, heaviness, and bulging veins  Pt does elevate legs but does not wear compression stockings       Chief Complaint: check my veins       HPI  Varicose Veins    Carrie Arredondo is seen for evaluation of: [x]Varicose veins/legs  []Spider veins/legs  []Spider veins/face  []Venous stasis ulcer  []Superficial thrombophlebitis  []Other -      She complains of []none  [x]bulging veins  []dilated veins  []discolored veins         There is [x]no edema              []right leg edema  []left leg edema       []bilateral lower extremity edema     There is   []no leg pain          []right leg pain  []left leg pain         []bilateral leg pain  []bilateral leg pain; L>R   [x]bilateral leg pain; R>L     Pain is described as [x]aching              []itching  []sharp                []burning  [x]throbbing         []stinging  [x]heavy                [x]dull  []other -      Symptoms have been ongoing for:  years   There is  [x]no pertinent medical history  []history of DVT  []PE  []superficial venous thrombosis     Prior treatment includes []none  []EVLT  [x]OTC stockings  []prescription compression stockings  []vein ligation  []vein stripping  []stab phlebectomy  []sclerotherapy injections  []Other -      Current treatment includes [x]none  []compression socks  []avoiding tight clothing  []leg elevation  []rest  []exercise  []weight management  []skin care  []periodic evaluation   []Other-     Treatment has been []effective  [x]ineffective     Patient reports sent to the office with her daughter for evaluation of bilateral lower extremity varicosities  She works in a [employer]  She has varicosities to bilateral lower extremity and popliteal fossa  She complains of pain and discomfort relating to varicosities  She also notes swelling at the end of the day  Muscle cramping  She has easily palpable pedal pulses bilaterally  No history of DVT/SVT    Review of Systems   Constitutional: Negative  HENT: Negative  Eyes: Negative  Respiratory: Negative  Cardiovascular: Positive for leg swelling  Painful Veins   Gastrointestinal: Negative  Endocrine: Negative  Genitourinary: Negative  Musculoskeletal: Negative  Skin: Negative  Allergic/Immunologic: Negative  Neurological: Negative  Hematological: Negative  Psychiatric/Behavioral: Negative             Objective       Review of Systems      The following portions of the patient's history were reviewed and updated as appropriate: allergies, current medications, past family history, past medical history, past social history, past surgical history and problem list   ROS reviewed     Physical Exam  Vitals signs and nursing note reviewed  Constitutional:       Appearance: Normal appearance  HENT:      Head: Normocephalic and atraumatic  Eyes:      Extraocular Movements: Extraocular movements intact  Cardiovascular:      Pulses: Normal pulses  Dorsalis pedis pulses are 2+ on the right side and 2+ on the left side  Heart sounds: Normal heart sounds  Pulmonary:      Breath sounds: Normal breath sounds  Abdominal:      Palpations: Abdomen is soft  Musculoskeletal: Normal range of motion  General: No swelling  Comments: Ankle varicosities of the anterior shin and posterior calf/popliteal fossa   Neurological:      General: No focal deficit present  Mental Status: She is alert and oriented to person, place, and time     Psychiatric:         Mood and Affect: Mood normal        Objective:     Vitals:    04/20/21 1333   BP: 126/88   BP Location: Left arm   Patient Position: Sitting   Cuff Size: Large   Pulse: (!) 108   Temp: (!) 97 °F (36 1 °C)   TempSrc: Tympanic   Weight: 86 2 kg (190 lb)   Height: 4' 10" (1 473 m)       Patient Active Problem List   Diagnosis    Diabetes mellitus [facility] )    Hypertension    Allergic rhinitis    Asthma    Hyperlipidemia    Sarcoidosis    Thalassemia    Vitamin D deficiency    Exposure to SARS-associated coronavirus    Moderate persistent asthma without complication    Varicose veins of both lower extremities with pain    Body mass index 39 0-39 9, adult    Uncontrolled type 2 diabetes mellitus with hyperglycemia ([facility] [address] )    Encounter for screening mammogram for malignant neoplasm of breast    Encounter for screening colonoscopy       Past Surgical History:   Procedure Laterality Date    CHOLECYSTECTOMY      COLONOSCOPY  05/15/2009    Abnormal     MAMMO (HISTORICAL)  09/30/2016    Negative        Family History   Problem Relation Age of Onset    Diabetes Mother     Coronary artery disease Father  Diabetes Maternal Grandmother     Stomach cancer Maternal Grandfather        Social History     Socioeconomic History    Marital status: /Civil Union     Spouse name: Not on file    Number of children: Not on file    Years of education: Not on file    Highest education level: Not on file   Occupational History    Not on file   Social Needs    Financial resource strain: Not on file    Food insecurity     Worry: Not on file     Inability: Not on file   [employer] needs     Medical: Not on file     Non-medical: Not on file   Tobacco Use    Smoking status: Never Smoker    Smokeless tobacco: Never Used   Substance and Sexual Activity    Alcohol use: Not Currently     Frequency: Never    Drug use: Never    Sexual activity: Yes     Partners: Male   Lifestyle    Physical activity     Days per week: Not on file     Minutes per session: Not on file    Stress: Not on file   Relationships    Social connections     Talks on phone: Not on file     Gets together: Not on file     Attends Orthodoxy service: Not on file     Active member of club or organization: Not on file     Attends meetings of clubs or organizations: Not on file     Relationship status: Not on file    Intimate partner violence     Fear of current or ex partner: Not on file     Emotionally abused: Not on file     Physically abused: Not on file     Forced sexual activity: Not on file   Other Topics Concern    Not on file   Social History Narrative    Family: 3 brothers, 3 sisters, 3 sons, 3 daughters    Caffeine:  Yes       Allergies   Allergen Reactions    Sulfa Antibiotics          Current Outpatient Medications:     amLODIPine (NORVASC) 2 5 mg tablet, Take 1 tablet (2 5 mg total) by mouth daily, Disp: 30 tablet, Rfl: 2    Empagliflozin (Jardiance) 10 MG TABS, Take 1 tablet (10 mg total) by mouth every morning, Disp: 30 tablet, Rfl: 2    Janumet  MG per tablet, Take 1 tablet by mouth 2 (two) times a day with meals, Disp: 60 tablet, Rfl: 3    losartan-hydrochlorothiazide (HYZAAR) 100-25 MG per tablet, Take 1 tablet by mouth daily, Disp: 90 tablet, Rfl: 1    verapamil (VERELAN) 240 MG 24 hr capsule, Take 240 mg by mouth daily at bedtime, Disp: , Rfl:     clotrimazole (LOTRIMIN) 1 % cream, Apply topically 2 (two) times a day (Patient not taking: Reported on 3/30/2021), Disp: 30 g, Rfl: 0    psyllium (METAMUCIL SMOOTH TEXTURE) 28 % packet, Take 1 packet by mouth 2 (two) times a day (Patient not taking: Reported on 3/30/2021), Disp: 60 packet, Rfl: 5    triamcinolone (KENALOG) 0 1 % cream, Apply topically 2 (two) times a day (Patient not taking: Reported on 3/30/2021), Disp: 30 g, Rfl: 0      Vitals:    04/20/21 1333   BP: 126/88   Pulse: (!) 108   Temp: (!) 97 °F (36 1 °C)

## 2021-04-20 NOTE — ASSESSMENT & PLAN NOTE
60-year-old female with HTN, HLD, DM, bilateral lower extremity varicosities referred for vascular evaluation   -Bilateral posterior popliteal fossa and anterior shin truncal varicosities with heaviness, throbbing, discomfort   -We discussed physiology of venous disease, available treatment options and indications for treatment  -Recommended trial of conservative measures which includes the daily use of gradient compression hose, periodic leg elevation and regular exercise   -Rx 20-30 mmHg compression socks given   -Will evaluate with venous reflux study in 3 months   -Return to the office with vascular surgeon to review study and discuss treatment options

## 2021-04-21 ENCOUNTER — TELEPHONE (OUTPATIENT)
Dept: INTERNAL MEDICINE CLINIC | Facility: CLINIC | Age: 61
End: 2021-04-21

## 2021-04-21 ENCOUNTER — OFFICE VISIT (OUTPATIENT)
Dept: INTERNAL MEDICINE CLINIC | Facility: CLINIC | Age: 61
End: 2021-04-21
Payer: COMMERCIAL

## 2021-04-21 VITALS
BODY MASS INDEX: 40.09 KG/M2 | SYSTOLIC BLOOD PRESSURE: 136 MMHG | TEMPERATURE: 97.2 F | DIASTOLIC BLOOD PRESSURE: 82 MMHG | OXYGEN SATURATION: 98 % | HEIGHT: 58 IN | HEART RATE: 89 BPM | WEIGHT: 191 LBS

## 2021-04-21 DIAGNOSIS — I10 ESSENTIAL HYPERTENSION, BENIGN: ICD-10-CM

## 2021-04-21 DIAGNOSIS — J45.40 MODERATE PERSISTENT ASTHMA WITHOUT COMPLICATION: ICD-10-CM

## 2021-04-21 DIAGNOSIS — E55.9 VITAMIN D DEFICIENCY: ICD-10-CM

## 2021-04-21 DIAGNOSIS — M17.0 PRIMARY OSTEOARTHRITIS OF BOTH KNEES: ICD-10-CM

## 2021-04-21 DIAGNOSIS — E11.65 UNCONTROLLED TYPE 2 DIABETES MELLITUS WITH HYPERGLYCEMIA (HCC): Primary | ICD-10-CM

## 2021-04-21 DIAGNOSIS — E78.2 MIXED HYPERLIPIDEMIA: ICD-10-CM

## 2021-04-21 DIAGNOSIS — D86.9 SARCOIDOSIS: ICD-10-CM

## 2021-04-21 DIAGNOSIS — I83.813 VARICOSE VEINS OF BOTH LOWER EXTREMITIES WITH PAIN: ICD-10-CM

## 2021-04-21 PROCEDURE — 3008F BODY MASS INDEX DOCD: CPT | Performed by: INTERNAL MEDICINE

## 2021-04-21 PROCEDURE — 99214 OFFICE O/P EST MOD 30 MIN: CPT | Performed by: INTERNAL MEDICINE

## 2021-04-21 PROCEDURE — 1036F TOBACCO NON-USER: CPT | Performed by: INTERNAL MEDICINE

## 2021-04-21 PROCEDURE — 3725F SCREEN DEPRESSION PERFORMED: CPT | Performed by: INTERNAL MEDICINE

## 2021-04-21 RX ORDER — ROSUVASTATIN CALCIUM 10 MG/1
10 TABLET, COATED ORAL DAILY
Qty: 30 TABLET | Refills: 5 | Status: SHIPPED | OUTPATIENT
Start: 2021-04-21 | End: 2021-09-29 | Stop reason: SDUPTHER

## 2021-04-21 NOTE — PROGRESS NOTES
Assessment/Plan:             1  Uncontrolled type 2 diabetes mellitus with hyperglycemia (HCC)  -     Dulaglutide 3 MG/0 5ML SOPN; Inject 0 5 mL (3 mg total) under the skin once a week  -     Insulin Pen Needle 29G X 10MM MISC; Use once a week  -     Continous glucose monitoring dexcom placement; Future  -     Continous glucose monitoring dexcom intrepretation; Future    2  Sarcoidosis    3  Mixed hyperlipidemia  -     rosuvastatin (CRESTOR) 10 MG tablet; Take 1 tablet (10 mg total) by mouth daily    4  Moderate persistent asthma without complication    5  Essential hypertension, benign    6  Varicose veins of both lower extremities with pain    7  Vitamin D deficiency    8  Primary osteoarthritis of both knees    9  Body mass index 39 0-39 9, adult           Subjective:      Patient ID: Grey Padilla is a 63 y o  female  Follow-up on blood test done on 04/16/2021-discussed with her      The following portions of the patient's history were reviewed and updated as appropriate: She  has a past medical history of Diabetes mellitus ([address] ), Hypercholesterolemia, Hypertension, Rheumatoid arthritis ([address] ), and Sarcoidosis    She   Patient Active Problem List    Diagnosis Date Noted    Essential hypertension, benign 04/21/2021    Primary osteoarthritis of both knees 04/21/2021    Varicose veins of both lower extremities with pain 03/30/2021    Body mass index 39 0-39 9, adult 03/30/2021    Uncontrolled type 2 diabetes mellitus with hyperglycemia ([address] ) 03/30/2021    Encounter for screening mammogram for malignant neoplasm of breast 03/30/2021    Encounter for screening colonoscopy 03/30/2021    Moderate persistent asthma without complication 69/34/7549    Exposure to SARS-associated coronavirus 09/10/2020    Allergic rhinitis 03/18/2020    Asthma 03/18/2020    Hyperlipidemia 03/18/2020    Sarcoidosis 03/18/2020    Thalassemia 03/18/2020    Vitamin D deficiency 03/18/2020    Diabetes mellitus ([address] ) 03/14/2019  Hypertension 03/14/2019     She  has a past surgical history that includes Colonoscopy (05/15/2009); Cholecystectomy; and Mammo (historical) (09/30/2016)  Her family history includes Coronary artery disease in her father; Diabetes in her maternal grandmother and mother; Stomach cancer in her maternal grandfather  She  reports that she has never smoked  She has never used smokeless tobacco  She reports previous alcohol use  She reports that she does not use drugs  Current Outpatient Medications   Medication Sig Dispense Refill    Empagliflozin (Jardiance) 10 MG TABS Take 1 tablet (10 mg total) by mouth every morning 30 tablet 2    Janumet  MG per tablet Take 1 tablet by mouth 2 (two) times a day with meals 60 tablet 3    losartan-hydrochlorothiazide (HYZAAR) 100-25 MG per tablet Take 1 tablet by mouth daily 90 tablet 1    verapamil (VERELAN) 240 MG 24 hr capsule Take 240 mg by mouth daily at bedtime      Dulaglutide 3 MG/0 5ML SOPN Inject 0 5 mL (3 mg total) under the skin once a week 4 pen 3    Insulin Pen Needle 29G X 10MM MISC Use once a week 13 each 1    rosuvastatin (CRESTOR) 10 MG tablet Take 1 tablet (10 mg total) by mouth daily 30 tablet 5     No current facility-administered medications for this visit        Current Outpatient Medications on File Prior to Visit   Medication Sig    Empagliflozin (Jardiance) 10 MG TABS Take 1 tablet (10 mg total) by mouth every morning    Janumet  MG per tablet Take 1 tablet by mouth 2 (two) times a day with meals    losartan-hydrochlorothiazide (HYZAAR) 100-25 MG per tablet Take 1 tablet by mouth daily    verapamil (VERELAN) 240 MG 24 hr capsule Take 240 mg by mouth daily at bedtime    [DISCONTINUED] amLODIPine (NORVASC) 2 5 mg tablet Take 1 tablet (2 5 mg total) by mouth daily (Patient not taking: Reported on 4/21/2021)    [DISCONTINUED] clotrimazole (LOTRIMIN) 1 % cream Apply topically 2 (two) times a day (Patient not taking: Reported on 3/30/2021)    [DISCONTINUED] psyllium (METAMUCIL SMOOTH TEXTURE) 28 % packet Take 1 packet by mouth 2 (two) times a day (Patient not taking: Reported on 3/30/2021)    [DISCONTINUED] triamcinolone (KENALOG) 0 1 % cream Apply topically 2 (two) times a day (Patient not taking: Reported on 3/30/2021)     No current facility-administered medications on file prior to visit  She is allergic to sulfa antibiotics       Review of Systems   Constitutional: Negative for chills and fever  HENT: Negative for congestion, ear pain and sore throat  Eyes: Negative for pain  Respiratory: Negative for cough and shortness of breath  Cardiovascular: Negative for chest pain and leg swelling  Gastrointestinal: Negative for abdominal pain, nausea and vomiting  Endocrine: Negative for polyuria  Genitourinary: Negative for difficulty urinating, frequency and urgency  Musculoskeletal: Positive for arthralgias  Negative for back pain  Skin: Negative for rash  Neurological: Negative for weakness and headaches  Psychiatric/Behavioral: Negative for sleep disturbance  The patient is not nervous/anxious  Objective:      /82 (BP Location: Left arm, Patient Position: Sitting, Cuff Size: Standard)   Pulse 89   Temp (!) 97 2 °F (36 2 °C) (Temporal)   Ht 4' 10" (1 473 m)   Wt 86 6 kg (191 lb)   SpO2 98%   BMI 39 92 kg/m²     Recent Results (from the past 1344 hour(s))   HEMOGLOBIN A1C    Collection Time: 04/16/21 10:12 AM   Result Value Ref Range    Hemoglobin A1C 11 9 (H) <5 7 %    eAG, EST AVG Glucose 295 (H) <154 mg/dL        Physical Exam  Constitutional:       Appearance: Normal appearance  HENT:      Head: Normocephalic  Right Ear: Tympanic membrane, ear canal and external ear normal       Left Ear: Tympanic membrane, ear canal and external ear normal       Nose: Nose normal  No congestion  Mouth/Throat:      Mouth: Mucous membranes are moist       Pharynx: Oropharynx is clear   No oropharyngeal exudate or posterior oropharyngeal erythema  Eyes:      Extraocular Movements: Extraocular movements intact  Conjunctiva/sclera: Conjunctivae normal       Pupils: Pupils are equal, round, and reactive to light  Neck:      Musculoskeletal: Normal range of motion and neck supple  Cardiovascular:      Rate and Rhythm: Normal rate and regular rhythm  Heart sounds: Normal heart sounds  No murmur  Pulmonary:      Effort: Pulmonary effort is normal       Breath sounds: Normal breath sounds  No wheezing or rales  Abdominal:      General: Bowel sounds are normal  There is no distension  Palpations: Abdomen is soft  Tenderness: There is no abdominal tenderness  Musculoskeletal: Normal range of motion  Right lower leg: No edema  Left lower leg: No edema  Lymphadenopathy:      Cervical: No cervical adenopathy  Skin:     General: Skin is warm  Neurological:      General: No focal deficit present  Mental Status: She is alert and oriented to person, place, and time

## 2021-04-22 PROCEDURE — 88305 TISSUE EXAM BY PATHOLOGIST: CPT | Performed by: PATHOLOGY

## 2021-04-23 ENCOUNTER — LAB REQUISITION (OUTPATIENT)
Dept: LAB | Facility: HOSPITAL | Age: 61
End: 2021-04-23
Payer: COMMERCIAL

## 2021-04-23 DIAGNOSIS — D12.4 BENIGN NEOPLASM OF DESCENDING COLON: ICD-10-CM

## 2021-04-23 DIAGNOSIS — K57.30 DIVERTICULOSIS OF LARGE INTESTINE WITHOUT PERFORATION OR ABSCESS WITHOUT BLEEDING: ICD-10-CM

## 2021-04-23 DIAGNOSIS — Z12.11 ENCOUNTER FOR SCREENING FOR MALIGNANT NEOPLASM OF COLON: ICD-10-CM

## 2021-04-23 DIAGNOSIS — D17.79 BENIGN LIPOMATOUS NEOPLASM OF OTHER SITES: ICD-10-CM

## 2021-04-23 DIAGNOSIS — K63.5 POLYP OF COLON: ICD-10-CM

## 2021-05-12 ENCOUNTER — RA CDI HCC (OUTPATIENT)
Dept: OTHER | Facility: HOSPITAL | Age: 61
End: 2021-05-12

## 2021-05-12 NOTE — PROGRESS NOTES
[facility]          Chart reviewed, no opportunity found: CHART REVIEWED, NO OPPORTUNITY FOUND              Patients insurance company: [employer] (Medicare Advantage and Commercial)

## 2021-05-18 ENCOUNTER — TELEMEDICINE (OUTPATIENT)
Dept: INTERNAL MEDICINE CLINIC | Facility: CLINIC | Age: 61
End: 2021-05-18
Payer: COMMERCIAL

## 2021-05-18 ENCOUNTER — TELEPHONE (OUTPATIENT)
Dept: INTERNAL MEDICINE CLINIC | Facility: CLINIC | Age: 61
End: 2021-05-18

## 2021-05-18 VITALS — WEIGHT: 189 LBS | BODY MASS INDEX: 39.67 KG/M2 | HEIGHT: 58 IN

## 2021-05-18 DIAGNOSIS — I83.813 VARICOSE VEINS OF BOTH LOWER EXTREMITIES WITH PAIN: ICD-10-CM

## 2021-05-18 DIAGNOSIS — E55.9 VITAMIN D DEFICIENCY: ICD-10-CM

## 2021-05-18 DIAGNOSIS — E11.65 UNCONTROLLED TYPE 2 DIABETES MELLITUS WITH HYPERGLYCEMIA (HCC): ICD-10-CM

## 2021-05-18 DIAGNOSIS — J45.40 MODERATE PERSISTENT ASTHMA WITHOUT COMPLICATION: ICD-10-CM

## 2021-05-18 DIAGNOSIS — U07.1 COVID-19 VIRUS DETECTED: Primary | ICD-10-CM

## 2021-05-18 DIAGNOSIS — M17.0 PRIMARY OSTEOARTHRITIS OF BOTH KNEES: ICD-10-CM

## 2021-05-18 DIAGNOSIS — I10 ESSENTIAL HYPERTENSION, BENIGN: ICD-10-CM

## 2021-05-18 DIAGNOSIS — R11.0 NAUSEA: ICD-10-CM

## 2021-05-18 DIAGNOSIS — D86.9 SARCOIDOSIS: ICD-10-CM

## 2021-05-18 DIAGNOSIS — E78.2 MIXED HYPERLIPIDEMIA: ICD-10-CM

## 2021-05-18 PROCEDURE — 1036F TOBACCO NON-USER: CPT | Performed by: INTERNAL MEDICINE

## 2021-05-18 PROCEDURE — 3725F SCREEN DEPRESSION PERFORMED: CPT | Performed by: INTERNAL MEDICINE

## 2021-05-18 PROCEDURE — 3008F BODY MASS INDEX DOCD: CPT | Performed by: INTERNAL MEDICINE

## 2021-05-18 PROCEDURE — 99214 OFFICE O/P EST MOD 30 MIN: CPT | Performed by: INTERNAL MEDICINE

## 2021-05-18 RX ORDER — ONDANSETRON 4 MG/1
4 TABLET, FILM COATED ORAL EVERY 8 HOURS PRN
Qty: 20 TABLET | Refills: 0 | Status: SHIPPED | OUTPATIENT
Start: 2021-05-18 | End: 2022-01-28

## 2021-05-18 NOTE — PROGRESS NOTES
Virtual Regular Visit  Advised her to check sugar at home,  eat small frequent amount of meals, needs to call me if she gets worse  Assessment/Plan:    Problem List Items Addressed This Visit        Endocrine    Uncontrolled type 2 diabetes mellitus with hyperglycemia (HCC)       Respiratory    Moderate persistent asthma without complication       Cardiovascular and Mediastinum    Varicose veins of both lower extremities with pain    Essential hypertension, benign       Musculoskeletal and Integument    Primary osteoarthritis of both knees       Other    Sarcoidosis    Vitamin D deficiency    Nausea    Relevant Medications    ondansetron (ZOFRAN) 4 mg tablet    COVID-19 virus detected - Primary    Mixed hyperlipidemia               Reason for visit is   Chief Complaint   Patient presents with    Virtual Regular Visit     Covid positive- nausea and stomach pain    Virtual Regular Visit        Encounter provider Wen Grant MD    Provider located at [address]  [facility] [id number] [phone]      Recent Visits  No visits were found meeting these conditions  Showing recent visits within past 7 days and meeting all other requirements     Today's Visits  Date Type Provider Dept   05/18/21 Telephone [address] Internal Med   05/18/21 Telemedicine Wen Grant MD [facility]  74 Internal Med   Showing today's visits and meeting all other requirements     Future Appointments  No visits were found meeting these conditions  Showing future appointments within next 150 days and meeting all other requirements        The patient was identified by name and date of birth  Grey Padilla was informed that this is a telemedicine visit and that the visit is being conducted through [address] [place] [address] and patient was informed that this is not a secure, HIPAA-compliant platform  She agrees to proceed     My office door was closed   No one else was in the room  She acknowledged consent and understanding of privacy and security of the video platform  The patient has agreed to participate and understands they can discontinue the visit at any time  Patient is aware this is a billable service  Subjective  Cynthia Hernandez is a 57 y o  female    Follow-up on COVID-19 positive case, sick since 05/06/2021, with diarrhea, no blood in the bowel movement, got tested on 05/12/2021, turn out to be COVID positive, had a COVID vaccine done on 04/07/2021, got J and J vaccine, still feeling nauseous, diarrhea better, no fever       Past Medical History:   Diagnosis Date    Diabetes mellitus ([facility] )     Hypercholesterolemia     Hypertension     Rheumatoid arthritis ([facility] )     Sarcoidosis        Past Surgical History:   Procedure Laterality Date    CHOLECYSTECTOMY      COLONOSCOPY  05/15/2009    Abnormal     MAMMO (HISTORICAL)  09/30/2016    Negative        Current Outpatient Medications   Medication Sig Dispense Refill    Dulaglutide 3 MG/0 5ML SOPN Inject 0 5 mL (3 mg total) under the skin once a week 4 pen 3    Empagliflozin (Jardiance) 10 MG TABS Take 1 tablet (10 mg total) by mouth every morning 30 tablet 2    Insulin Pen Needle 29G X 10MM MISC Use once a week 13 each 1    Janumet  MG per tablet Take 1 tablet by mouth 2 (two) times a day with meals 60 tablet 3    losartan-hydrochlorothiazide (HYZAAR) 100-25 MG per tablet Take 1 tablet by mouth daily 90 tablet 1    rosuvastatin (CRESTOR) 10 MG tablet Take 1 tablet (10 mg total) by mouth daily 30 tablet 5    verapamil (VERELAN) 240 MG 24 hr capsule Take 240 mg by mouth daily at bedtime      ondansetron (ZOFRAN) 4 mg tablet Take 1 tablet (4 mg total) by mouth every 8 (eight) hours as needed for nausea or vomiting 20 tablet 0     No current facility-administered medications for this visit           Allergies   Allergen Reactions    Sulfa Antibiotics        Review of Systems Constitutional: Negative for chills and fever  HENT: Negative for congestion, ear pain and sore throat  Eyes: Negative for pain  Respiratory: Negative for cough and shortness of breath  Cardiovascular: Negative for chest pain and leg swelling  Gastrointestinal: Positive for diarrhea and nausea  Negative for abdominal pain and vomiting  Endocrine: Negative for polyuria  Genitourinary: Negative for difficulty urinating, frequency and urgency  Musculoskeletal: Negative for arthralgias and back pain  Skin: Negative for rash  Neurological: Negative for weakness and headaches  Psychiatric/Behavioral: Negative for sleep disturbance  The patient is not nervous/anxious  Video Exam    Vitals:    05/18/21 0958   Weight: 85 7 kg (189 lb)   Height: 4' 10" (1 473 m)       Physical Exam  Constitutional:       Appearance: Normal appearance  Eyes:      Extraocular Movements: Extraocular movements intact  Conjunctiva/sclera: Conjunctivae normal    Neurological:      General: No focal deficit present  Mental Status: She is alert and oriented to person, place, and time  I spent 25 minutes directly with the patient during this visit      VIRTUAL VISIT DISCLAIMER    Manuela Tran acknowledges that she has consented to an online visit or consultation  She understands that the online visit is based solely on information provided by her, and that, in the absence of a face-to-face physical evaluation by the physician, the diagnosis she receives is both limited and provisional in terms of accuracy and completeness  This is not intended to replace a full medical face-to-face evaluation by the physician  Manuela Tran understands and accepts these terms

## 2021-05-18 NOTE — LETTER
May 18, 2021     Patient: Ashish Tony   YOB: 1960   Date of Visit: 5/18/2021       To Whom it May Concern:    Ashish Tony is under my professional care  She was seen in my office on 5/18/2021  She may return to work on 5/24/2021  Work excuse from 05/12/2021 to 05/23/2021  If you have any questions or concerns, please don't hesitate to call           Sincerely,          Shena Urban MD        CC: No Recipients

## 2021-06-23 ENCOUNTER — RA CDI HCC (OUTPATIENT)
Dept: OTHER | Facility: HOSPITAL | Age: 61
End: 2021-06-23

## 2021-06-23 NOTE — PROGRESS NOTES
[facility]          Chart reviewed, no opportunity found: CHART REVIEWED, NO OPPORTUNITY FOUND                     Patients insurance company:  [employer] [facility] (Medicare Advantage and Commercial)

## 2021-07-22 ENCOUNTER — TELEPHONE (OUTPATIENT)
Dept: VASCULAR SURGERY | Facility: CLINIC | Age: 61
End: 2021-07-22

## 2021-07-22 NOTE — TELEPHONE ENCOUNTER
Attempted to contact patient to schedule appointment(s) listed below  Requested patient call [phone] option 3 to schedule appointment(s)  Patient's appointment(s) are due now  Dopplers  [] Abdominal Aorta Iliac (AOIL)  [] Carotid (CV)   [] Celiac and/or Mesenteric  [] Endovascular Aortic Repair (EVAR)   [] Hemodialysis Access (HD)   [] Lower Limb Arterial (AL)  [] Lower Limb Venous Duplex with Reflux (LEVDR)  [] Renal Artery  [] Upper Limb (UEA)    Advanced Imaging   [] CTA abdomen    [] CTA abdomen & pelvis    [] CT abdomen with/ without contrast  [] CT abdomen with contrast  [] CT abdomen without contrast    [] CT abdomen & pelvis with/ without contrast  [] CT abdomen & pelvis with contrast  [] CT abdomen & pelvis without contrast    Office Visit   [] New patient, patient last seen over 3 years ago  [] Risk factor modification     REVIEW REFLUX STUDY 7/20/21 @ [facility]  VARICOSE VEINS WITH PAIN

## 2021-08-12 DIAGNOSIS — I10 ESSENTIAL HYPERTENSION: ICD-10-CM

## 2021-08-12 RX ORDER — LOSARTAN POTASSIUM AND HYDROCHLOROTHIAZIDE 25; 100 MG/1; MG/1
1 TABLET ORAL DAILY
Qty: 90 TABLET | Refills: 0 | Status: SHIPPED | OUTPATIENT
Start: 2021-08-12 | End: 2021-09-29 | Stop reason: SDUPTHER

## 2021-08-26 ENCOUNTER — TELEPHONE (OUTPATIENT)
Dept: INTERNAL MEDICINE CLINIC | Facility: CLINIC | Age: 61
End: 2021-08-26

## 2021-09-29 ENCOUNTER — OFFICE VISIT (OUTPATIENT)
Dept: INTERNAL MEDICINE CLINIC | Facility: CLINIC | Age: 61
End: 2021-09-29
Payer: COMMERCIAL

## 2021-09-29 VITALS
BODY MASS INDEX: 38.62 KG/M2 | HEART RATE: 98 BPM | SYSTOLIC BLOOD PRESSURE: 122 MMHG | OXYGEN SATURATION: 99 % | WEIGHT: 184 LBS | HEIGHT: 58 IN | DIASTOLIC BLOOD PRESSURE: 84 MMHG | TEMPERATURE: 97.8 F

## 2021-09-29 DIAGNOSIS — E11.9 TYPE 2 DIABETES MELLITUS WITHOUT COMPLICATION, WITH LONG-TERM CURRENT USE OF INSULIN (HCC): ICD-10-CM

## 2021-09-29 DIAGNOSIS — E78.2 MIXED HYPERLIPIDEMIA: ICD-10-CM

## 2021-09-29 DIAGNOSIS — E11.65 UNCONTROLLED TYPE 2 DIABETES MELLITUS WITH HYPERGLYCEMIA (HCC): Primary | ICD-10-CM

## 2021-09-29 DIAGNOSIS — I10 ESSENTIAL HYPERTENSION: ICD-10-CM

## 2021-09-29 DIAGNOSIS — Z79.4 TYPE 2 DIABETES MELLITUS WITHOUT COMPLICATION, WITH LONG-TERM CURRENT USE OF INSULIN (HCC): ICD-10-CM

## 2021-09-29 PROCEDURE — 99214 OFFICE O/P EST MOD 30 MIN: CPT | Performed by: INTERNAL MEDICINE

## 2021-09-29 RX ORDER — LOSARTAN POTASSIUM AND HYDROCHLOROTHIAZIDE 25; 100 MG/1; MG/1
1 TABLET ORAL DAILY
Qty: 90 TABLET | Refills: 0 | Status: SHIPPED | OUTPATIENT
Start: 2021-09-29 | End: 2021-09-29

## 2021-09-29 RX ORDER — ROSUVASTATIN CALCIUM 10 MG/1
10 TABLET, COATED ORAL DAILY
Qty: 90 TABLET | Refills: 1 | Status: SHIPPED | OUTPATIENT
Start: 2021-09-29 | End: 2022-01-28 | Stop reason: SDUPTHER

## 2021-09-29 RX ORDER — VERAPAMIL HYDROCHLORIDE 240 MG/1
240 CAPSULE, EXTENDED RELEASE ORAL
Qty: 90 CAPSULE | Refills: 1 | Status: SHIPPED | OUTPATIENT
Start: 2021-09-29 | End: 2021-09-29

## 2021-09-29 RX ORDER — ROSUVASTATIN CALCIUM 10 MG/1
10 TABLET, COATED ORAL DAILY
Qty: 90 TABLET | Refills: 1 | Status: SHIPPED | OUTPATIENT
Start: 2021-09-29 | End: 2021-09-29

## 2021-09-29 RX ORDER — LOSARTAN POTASSIUM AND HYDROCHLOROTHIAZIDE 25; 100 MG/1; MG/1
1 TABLET ORAL DAILY
Qty: 90 TABLET | Refills: 0 | Status: SHIPPED | OUTPATIENT
Start: 2021-09-29 | End: 2022-01-28 | Stop reason: SDUPTHER

## 2021-09-29 RX ORDER — SITAGLIPTIN AND METFORMIN HYDROCHLORIDE 500; 50 MG/1; MG/1
1 TABLET, FILM COATED ORAL 2 TIMES DAILY WITH MEALS
Qty: 180 TABLET | Refills: 1 | Status: SHIPPED | OUTPATIENT
Start: 2021-09-29 | End: 2021-09-29

## 2021-09-29 RX ORDER — EMPAGLIFLOZIN 10 MG/1
10 TABLET, FILM COATED ORAL EVERY MORNING
Qty: 30 TABLET | Refills: 2 | Status: SHIPPED | OUTPATIENT
Start: 2021-09-29 | End: 2022-01-28

## 2021-09-29 RX ORDER — EMPAGLIFLOZIN 10 MG/1
10 TABLET, FILM COATED ORAL EVERY MORNING
Qty: 30 TABLET | Refills: 2 | Status: SHIPPED | OUTPATIENT
Start: 2021-09-29 | End: 2021-09-29

## 2021-09-29 RX ORDER — SITAGLIPTIN AND METFORMIN HYDROCHLORIDE 500; 50 MG/1; MG/1
1 TABLET, FILM COATED ORAL 2 TIMES DAILY WITH MEALS
Qty: 180 TABLET | Refills: 1 | Status: SHIPPED | OUTPATIENT
Start: 2021-09-29 | End: 2022-01-28 | Stop reason: SDUPTHER

## 2021-09-29 RX ORDER — VERAPAMIL HYDROCHLORIDE 240 MG/1
240 CAPSULE, EXTENDED RELEASE ORAL
Qty: 90 CAPSULE | Refills: 1 | Status: SHIPPED | OUTPATIENT
Start: 2021-09-29 | End: 2022-01-28

## 2021-09-29 NOTE — PROGRESS NOTES
Assessment/Plan:      Depression Screening and Follow-up Plan:   Patient was screened for depression during today's encounter  They screened negative with a PHQ-2 score of 0             1  Uncontrolled type 2 diabetes mellitus with hyperglycemia (HCC)  -     CBC and differential; Future  -     Comprehensive metabolic panel; Future  -     Hemoglobin A1C; Future  -     Lipid Panel with Direct LDL reflex; Future  -     Microalbumin / creatinine urine ratio  -     TSH, 3rd generation; Future  -     Dulaglutide 3 MG/0 5ML SOPN; Inject 0 5 mL (3 mg total) under the skin once a week  -     Insulin Pen Needle 29G X 10MM MISC; Use once a week    2  Essential hypertension  -     CBC and differential; Future  -     Comprehensive metabolic panel; Future  -     Hemoglobin A1C; Future  -     Lipid Panel with Direct LDL reflex; Future  -     Microalbumin / creatinine urine ratio  -     TSH, 3rd generation; Future  -     losartan-hydrochlorothiazide (HYZAAR) 100-25 MG per tablet; Take 1 tablet by mouth daily  -     verapamil (VERELAN) 240 MG 24 hr capsule; Take 1 capsule (240 mg total) by mouth daily at bedtime    3  Mixed hyperlipidemia  -     Comprehensive metabolic panel; Future  -     Hemoglobin A1C; Future  -     Lipid Panel with Direct LDL reflex; Future  -     Microalbumin / creatinine urine ratio  -     TSH, 3rd generation; Future  -     rosuvastatin (CRESTOR) 10 MG tablet; Take 1 tablet (10 mg total) by mouth daily    4  Type 2 diabetes mellitus without complication, with long-term current use of insulin (HCC)  -     Empagliflozin (Jardiance) 10 MG TABS; Take 1 tablet (10 mg total) by mouth every morning  -     Janumet  MG per tablet; Take 1 tablet by mouth 2 (two) times a day with meals           Subjective:      Patient ID: Karlee Winkler is a 57 y o  female      Follow-up on multiple medical problems to ensure they are stable on current medications      The following portions of the patient's history were reviewed and updated as appropriate: She  has a past medical history of Diabetes mellitus ([facility] [address] Hypercholesterolemia, Hypertension, Rheumatoid arthritis ([facility] [address] ), and Sarcoidosis  She   Patient Active Problem List    Diagnosis Date Noted    Nausea 05/18/2021    COVID-19 virus detected 05/18/2021    Mixed hyperlipidemia 05/18/2021    Essential hypertension, benign 04/21/2021    Primary osteoarthritis of both knees 04/21/2021    Varicose veins of both lower extremities with pain 03/30/2021    Body mass index 39 0-39 9, adult 03/30/2021    Uncontrolled type 2 diabetes mellitus with hyperglycemia ([facility] [address] ) 03/30/2021    Encounter for screening mammogram for malignant neoplasm of breast 03/30/2021    Encounter for screening colonoscopy 03/30/2021    Moderate persistent asthma without complication 55/77/0365    Exposure to SARS-associated coronavirus 09/10/2020    Allergic rhinitis 03/18/2020    Asthma 03/18/2020    Hyperlipidemia 03/18/2020    Sarcoidosis 03/18/2020    Thalassemia 03/18/2020    Vitamin D deficiency 03/18/2020    [facility] 03/14/2019    Hypertension 03/14/2019     She  has a past surgical history that includes Colonoscopy (05/15/2009); Cholecystectomy; and Mammo (historical) (09/30/2016)  Her family history includes Coronary artery disease in her father; Diabetes in her maternal grandmother and mother; Stomach cancer in her maternal grandfather  She  reports that she has never smoked  She has never used smokeless tobacco  She reports previous alcohol use  She reports that she does not use drugs    Current Outpatient Medications   Medication Sig Dispense Refill    Dulaglutide 3 MG/0 5ML SOPN Inject 0 5 mL (3 mg total) under the skin once a week 6 mL 1    Empagliflozin (Jardiance) 10 MG TABS Take 1 tablet (10 mg total) by mouth every morning 30 tablet 2    Insulin Pen Needle 29G X 10MM MISC Use once a week 13 each 1    Janumet  MG per tablet Take 1 tablet by mouth 2 (two) times a day with meals 180 tablet 1    losartan-hydrochlorothiazide (HYZAAR) 100-25 MG per tablet Take 1 tablet by mouth daily 90 tablet 0    ondansetron (ZOFRAN) 4 mg tablet Take 1 tablet (4 mg total) by mouth every 8 (eight) hours as needed for nausea or vomiting 20 tablet 0    rosuvastatin (CRESTOR) 10 MG tablet Take 1 tablet (10 mg total) by mouth daily 90 tablet 1    verapamil (VERELAN) 240 MG 24 hr capsule Take 1 capsule (240 mg total) by mouth daily at bedtime 90 capsule 1     No current facility-administered medications for this visit  Current Outpatient Medications on File Prior to Visit   Medication Sig    ondansetron (ZOFRAN) 4 mg tablet Take 1 tablet (4 mg total) by mouth every 8 (eight) hours as needed for nausea or vomiting    [DISCONTINUED] Dulaglutide 3 MG/0 5ML SOPN Inject 0 5 mL (3 mg total) under the skin once a week    [DISCONTINUED] Empagliflozin (Jardiance) 10 MG TABS Take 1 tablet (10 mg total) by mouth every morning    [DISCONTINUED] Insulin Pen Needle 29G X 10MM MISC Use once a week    [DISCONTINUED] Janumet  MG per tablet Take 1 tablet by mouth 2 (two) times a day with meals    [DISCONTINUED] losartan-hydrochlorothiazide (HYZAAR) 100-25 MG per tablet TAKE 1 TABLET BY MOUTH DAILY    [DISCONTINUED] rosuvastatin (CRESTOR) 10 MG tablet Take 1 tablet (10 mg total) by mouth daily    [DISCONTINUED] verapamil (VERELAN) 240 MG 24 hr capsule Take 240 mg by mouth daily at bedtime     No current facility-administered medications on file prior to visit  She is allergic to sulfa antibiotics       Review of Systems   Constitutional: Negative for chills and fever  HENT: Negative for congestion, ear pain and sore throat  Eyes: Negative for pain  Respiratory: Negative for cough and shortness of breath  Cardiovascular: Negative for chest pain and leg swelling  Gastrointestinal: Negative for abdominal pain, nausea and vomiting  Endocrine: Negative for polyuria     Genitourinary: Negative for difficulty urinating, frequency and urgency  Musculoskeletal: Negative for arthralgias and back pain  Skin: Negative for rash  Neurological: Negative for weakness and headaches  Psychiatric/Behavioral: Negative for sleep disturbance  The patient is not nervous/anxious  Objective:      /84 (BP Location: Right arm, Patient Position: Sitting, Cuff Size: Standard)   Pulse 98   Temp 97 8 °F (36 6 °C) (Temporal)   Ht 4' 10" (1 473 m)   Wt 83 5 kg (184 lb)   SpO2 99%   BMI 38 46 kg/m²     No results found for this or any previous visit (from the past 1344 hour(s))  Physical Exam  Constitutional:       Appearance: Normal appearance  HENT:      Head: Normocephalic  Right Ear: Tympanic membrane, ear canal and external ear normal       Left Ear: Tympanic membrane, ear canal and external ear normal       Nose: Nose normal  No congestion  Mouth/Throat:      Mouth: Mucous membranes are moist       Pharynx: Oropharynx is clear  No oropharyngeal exudate or posterior oropharyngeal erythema  Eyes:      Extraocular Movements: Extraocular movements intact  Conjunctiva/sclera: Conjunctivae normal       Pupils: Pupils are equal, round, and reactive to light  Cardiovascular:      Rate and Rhythm: Normal rate and regular rhythm  Heart sounds: Normal heart sounds  No murmur heard  Pulmonary:      Effort: Pulmonary effort is normal       Breath sounds: Normal breath sounds  No wheezing or rales  Abdominal:      General: Bowel sounds are normal  There is no distension  Palpations: Abdomen is soft  Tenderness: There is no abdominal tenderness  Musculoskeletal:         General: Normal range of motion  Cervical back: Normal range of motion and neck supple  Right lower leg: No edema  Left lower leg: No edema  Lymphadenopathy:      Cervical: No cervical adenopathy  Skin:     General: Skin is warm     Neurological:      General: No focal deficit present  Mental Status: She is alert and oriented to person, place, and time     Psychiatric:         Mood and Affect: Mood normal          Behavior: Behavior normal

## 2021-10-04 PROCEDURE — 3061F NEG MICROALBUMINURIA REV: CPT | Performed by: INTERNAL MEDICINE

## 2021-10-27 ENCOUNTER — OFFICE VISIT (OUTPATIENT)
Dept: INTERNAL MEDICINE CLINIC | Facility: CLINIC | Age: 61
End: 2021-10-27
Payer: COMMERCIAL

## 2021-10-27 VITALS
HEART RATE: 96 BPM | DIASTOLIC BLOOD PRESSURE: 86 MMHG | BODY MASS INDEX: 38.62 KG/M2 | OXYGEN SATURATION: 98 % | WEIGHT: 184 LBS | HEIGHT: 58 IN | SYSTOLIC BLOOD PRESSURE: 110 MMHG | TEMPERATURE: 97.8 F

## 2021-10-27 DIAGNOSIS — E78.2 MIXED HYPERLIPIDEMIA: ICD-10-CM

## 2021-10-27 DIAGNOSIS — J45.40 MODERATE PERSISTENT ASTHMA WITHOUT COMPLICATION: ICD-10-CM

## 2021-10-27 DIAGNOSIS — I10 ESSENTIAL HYPERTENSION: ICD-10-CM

## 2021-10-27 DIAGNOSIS — I83.813 VARICOSE VEINS OF BOTH LOWER EXTREMITIES WITH PAIN: ICD-10-CM

## 2021-10-27 DIAGNOSIS — M17.0 PRIMARY OSTEOARTHRITIS OF BOTH KNEES: ICD-10-CM

## 2021-10-27 DIAGNOSIS — E11.65 UNCONTROLLED TYPE 2 DIABETES MELLITUS WITH HYPERGLYCEMIA (HCC): Primary | ICD-10-CM

## 2021-10-27 PROCEDURE — 1036F TOBACCO NON-USER: CPT | Performed by: INTERNAL MEDICINE

## 2021-10-27 PROCEDURE — 3074F SYST BP LT 130 MM HG: CPT | Performed by: INTERNAL MEDICINE

## 2021-10-27 PROCEDURE — 3079F DIAST BP 80-89 MM HG: CPT | Performed by: INTERNAL MEDICINE

## 2021-10-27 PROCEDURE — 99214 OFFICE O/P EST MOD 30 MIN: CPT | Performed by: INTERNAL MEDICINE

## 2021-10-27 PROCEDURE — 3008F BODY MASS INDEX DOCD: CPT | Performed by: INTERNAL MEDICINE

## 2021-10-27 PROCEDURE — 3725F SCREEN DEPRESSION PERFORMED: CPT | Performed by: INTERNAL MEDICINE

## 2021-10-27 RX ORDER — PEN NEEDLE, DIABETIC 29 G X1/2"
NEEDLE, DISPOSABLE MISCELLANEOUS
COMMUNITY
Start: 2021-09-29

## 2022-01-28 ENCOUNTER — OFFICE VISIT (OUTPATIENT)
Dept: INTERNAL MEDICINE CLINIC | Facility: CLINIC | Age: 62
End: 2022-01-28
Payer: COMMERCIAL

## 2022-01-28 VITALS
BODY MASS INDEX: 40.51 KG/M2 | HEIGHT: 58 IN | SYSTOLIC BLOOD PRESSURE: 136 MMHG | WEIGHT: 193 LBS | DIASTOLIC BLOOD PRESSURE: 84 MMHG | HEART RATE: 86 BPM | OXYGEN SATURATION: 98 % | TEMPERATURE: 97.9 F

## 2022-01-28 DIAGNOSIS — J45.40 MODERATE PERSISTENT ASTHMA WITHOUT COMPLICATION: ICD-10-CM

## 2022-01-28 DIAGNOSIS — E11.9 TYPE 2 DIABETES MELLITUS WITHOUT COMPLICATION, WITH LONG-TERM CURRENT USE OF INSULIN (HCC): ICD-10-CM

## 2022-01-28 DIAGNOSIS — I10 ESSENTIAL HYPERTENSION: ICD-10-CM

## 2022-01-28 DIAGNOSIS — E11.65 UNCONTROLLED TYPE 2 DIABETES MELLITUS WITH HYPERGLYCEMIA (HCC): Primary | ICD-10-CM

## 2022-01-28 DIAGNOSIS — Z79.4 TYPE 2 DIABETES MELLITUS WITHOUT COMPLICATION, WITH LONG-TERM CURRENT USE OF INSULIN (HCC): ICD-10-CM

## 2022-01-28 DIAGNOSIS — E78.2 MIXED HYPERLIPIDEMIA: ICD-10-CM

## 2022-01-28 DIAGNOSIS — I83.813 VARICOSE VEINS OF BOTH LOWER EXTREMITIES WITH PAIN: ICD-10-CM

## 2022-01-28 DIAGNOSIS — Z00.00 ANNUAL PHYSICAL EXAM: ICD-10-CM

## 2022-01-28 DIAGNOSIS — E66.01 CLASS 2 SEVERE OBESITY DUE TO EXCESS CALORIES WITH SERIOUS COMORBIDITY IN ADULT, UNSPECIFIED BMI (HCC): ICD-10-CM

## 2022-01-28 PROCEDURE — 99396 PREV VISIT EST AGE 40-64: CPT | Performed by: INTERNAL MEDICINE

## 2022-01-28 PROCEDURE — 1036F TOBACCO NON-USER: CPT | Performed by: INTERNAL MEDICINE

## 2022-01-28 PROCEDURE — 3725F SCREEN DEPRESSION PERFORMED: CPT | Performed by: INTERNAL MEDICINE

## 2022-01-28 PROCEDURE — 3079F DIAST BP 80-89 MM HG: CPT | Performed by: INTERNAL MEDICINE

## 2022-01-28 PROCEDURE — 3008F BODY MASS INDEX DOCD: CPT | Performed by: INTERNAL MEDICINE

## 2022-01-28 PROCEDURE — 99214 OFFICE O/P EST MOD 30 MIN: CPT | Performed by: INTERNAL MEDICINE

## 2022-01-28 PROCEDURE — 3075F SYST BP GE 130 - 139MM HG: CPT | Performed by: INTERNAL MEDICINE

## 2022-01-28 RX ORDER — SITAGLIPTIN AND METFORMIN HYDROCHLORIDE 500; 50 MG/1; MG/1
1 TABLET, FILM COATED ORAL 2 TIMES DAILY WITH MEALS
Qty: 180 TABLET | Refills: 1 | Status: SHIPPED | OUTPATIENT
Start: 2022-01-28

## 2022-01-28 RX ORDER — ROSUVASTATIN CALCIUM 10 MG/1
10 TABLET, COATED ORAL DAILY
Qty: 90 TABLET | Refills: 1 | Status: SHIPPED | OUTPATIENT
Start: 2022-01-28 | End: 2022-04-08 | Stop reason: SDUPTHER

## 2022-01-28 RX ORDER — VERAPAMIL HYDROCHLORIDE 240 MG/1
240 CAPSULE, EXTENDED RELEASE ORAL
COMMUNITY
End: 2022-01-28 | Stop reason: SDUPTHER

## 2022-01-28 RX ORDER — BISOPROLOL FUMARATE 5 MG/1
5 TABLET ORAL DAILY
Qty: 90 TABLET | Refills: 1 | Status: SHIPPED | OUTPATIENT
Start: 2022-01-28 | End: 2022-01-28

## 2022-01-28 RX ORDER — BISOPROLOL FUMARATE 5 MG/1
5 TABLET ORAL DAILY
COMMUNITY
End: 2022-01-28 | Stop reason: SDUPTHER

## 2022-01-28 RX ORDER — LOSARTAN POTASSIUM AND HYDROCHLOROTHIAZIDE 25; 100 MG/1; MG/1
1 TABLET ORAL DAILY
Qty: 90 TABLET | Refills: 1 | Status: SHIPPED | OUTPATIENT
Start: 2022-01-28 | End: 2022-04-08 | Stop reason: SDUPTHER

## 2022-01-28 RX ORDER — VERAPAMIL HYDROCHLORIDE 240 MG/1
240 CAPSULE, EXTENDED RELEASE ORAL
Qty: 90 CAPSULE | Refills: 1 | Status: SHIPPED | OUTPATIENT
Start: 2022-01-28

## 2022-01-28 NOTE — PATIENT INSTRUCTIONS

## 2022-01-28 NOTE — PROGRESS NOTES
Assessment/Plan:    Diabetes patient denies symptoms of hypoglycemia sugars usually in the 130s per patient  - will continue current regimen  BMI Counseling: Body mass index is 40 34 kg/m²  The BMI is above normal  Nutrition recommendations include decreasing portion sizes, encouraging healthy choices of fruits and vegetables and decreasing fast food intake  Exercise recommendations include moderate physical activity 150 minutes/week  Rationale for BMI follow-up plan is due to patient being overweight or obese  Hypertension blood pressure 130/90  She is compliant with her medication  - Will not make any changes to her medication    Obesity we discussed the benefits of diet and exercise and she verbalized understanding      1  Uncontrolled type 2 diabetes mellitus with hyperglycemia (HCC)  -     Dulaglutide 3 MG/0 5ML SOPN; Inject 0 5 mL (3 mg total) under the skin once a week    2  Essential hypertension  -     losartan-hydrochlorothiazide (HYZAAR) 100-25 MG per tablet; Take 1 tablet by mouth daily  -     verapamil (VERELAN) 240 MG 24 hr capsule; Take 1 capsule (240 mg total) by mouth daily at bedtime    3  Mixed hyperlipidemia  -     rosuvastatin (CRESTOR) 10 MG tablet; Take 1 tablet (10 mg total) by mouth daily    4  Class 2 severe obesity due to excess calories with serious comorbidity in adult, unspecified BMI ([address] )    5  Moderate persistent asthma without complication    6  Varicose veins of both lower extremities with pain    7  Type 2 diabetes mellitus without complication, with long-term current use of insulin (HCC)  -     Janumet  MG per tablet; Take 1 tablet by mouth 2 (two) times a day with meals           Subjective:      Patient ID: Galilea Whyte is a 57 y o  female  With a past medical history of diabetes, hypertension, hyperlipidemia, obesity presents today for follow-up of her chronic medical condition    Blood pressure is 130/90 patient is compliant with her medications, her blood sugar is under control A1c 6 8 she states that her home blood sugars ranges in the 130s  Patient denies headaches, lightheadedness, chest pain, shortness of breath, palpitation, abdominal pain, N/V/C/D patient looks stable not in any obvious distress on room air  Follow-up on multiple medical problems to ensure the stable on current medication, also for physical      The following portions of the patient's history were reviewed and updated as appropriate: She  has a past medical history of Diabetes mellitus ([facility] [address] ), Hypercholesterolemia, Hypertension, Rheumatoid arthritis ([facility] [address] ), and Sarcoidosis  She   Patient Active Problem List    Diagnosis Date Noted    Nausea 05/18/2021    COVID-19 virus detected 05/18/2021    Mixed hyperlipidemia 05/18/2021    Essential hypertension, benign 04/21/2021    Primary osteoarthritis of both knees 04/21/2021    Varicose veins of both lower extremities with pain 03/30/2021    Body mass index 39 0-39 9, adult 03/30/2021    Uncontrolled type 2 diabetes mellitus with hyperglycemia ([facility] [address] ) 03/30/2021    Encounter for screening mammogram for malignant neoplasm of breast 03/30/2021    Encounter for screening colonoscopy 03/30/2021    Moderate persistent asthma without complication 27/09/0103    Exposure to SARS-associated coronavirus 09/10/2020    Allergic rhinitis 03/18/2020    Asthma 03/18/2020    Hyperlipidemia 03/18/2020    Sarcoidosis 03/18/2020    Thalassemia 03/18/2020    Vitamin D deficiency 03/18/2020    Diabetes mellitus ([facility] [address] ) 03/14/2019    Essential hypertension 03/14/2019     She  has a past surgical history that includes Colonoscopy (05/15/2009); Cholecystectomy; and Mammo (historical) (09/30/2016)  Her family history includes Coronary artery disease in her father; Diabetes in her maternal grandmother and mother; Stomach cancer in her maternal grandfather  She  reports that she has never smoked   She has never used smokeless tobacco  She reports previous alcohol use  She reports that she does not use drugs  Current Outpatient Medications   Medication Sig Dispense Refill    Dulaglutide 3 MG/0 5ML SOPN Inject 0 5 mL (3 mg total) under the skin once a week 6 mL 1    Insulin Pen Needle 29G X 10MM MISC Use once a week 13 each 1    Janumet  MG per tablet Take 1 tablet by mouth 2 (two) times a day with meals 180 tablet 1    losartan-hydrochlorothiazide (HYZAAR) 100-25 MG per tablet Take 1 tablet by mouth daily 90 tablet 1    rosuvastatin (CRESTOR) 10 MG tablet Take 1 tablet (10 mg total) by mouth daily 90 tablet 1    UltiCare Mini Pen Needles 31G X 6 MM MISC       verapamil (VERELAN) 240 MG 24 hr capsule Take 1 capsule (240 mg total) by mouth daily at bedtime 90 capsule 1     No current facility-administered medications for this visit       Current Outpatient Medications on File Prior to Visit   Medication Sig    Insulin Pen Needle 29G X 10MM MISC Use once a week    UltiCare Mini Pen Needles 31G X 6 MM MISC     [DISCONTINUED] bisoprolol (ZEBETA) 5 mg tablet Take 5 mg by mouth daily    [DISCONTINUED] Dulaglutide 3 MG/0 5ML SOPN Inject 0 5 mL (3 mg total) under the skin once a week    [DISCONTINUED] Empagliflozin (Jardiance) 10 MG TABS Take 1 tablet (10 mg total) by mouth every morning    [DISCONTINUED] Janumet  MG per tablet Take 1 tablet by mouth 2 (two) times a day with meals    [DISCONTINUED] losartan-hydrochlorothiazide (HYZAAR) 100-25 MG per tablet Take 1 tablet by mouth daily    [DISCONTINUED] ondansetron (ZOFRAN) 4 mg tablet Take 1 tablet (4 mg total) by mouth every 8 (eight) hours as needed for nausea or vomiting    [DISCONTINUED] rosuvastatin (CRESTOR) 10 MG tablet Take 1 tablet (10 mg total) by mouth daily    [DISCONTINUED] verapamil (VERELAN) 240 MG 24 hr capsule Take 240 mg by mouth daily at bedtime    [DISCONTINUED] verapamil (VERELAN) 240 MG 24 hr capsule Take 1 capsule (240 mg total) by mouth daily at bedtime (Patient not taking: Reported on 1/28/2022 )     No current facility-administered medications on file prior to visit  She is allergic to sulfa antibiotics       Review of Systems   Constitutional: Negative for chills and fever  HENT: Negative for ear pain and sore throat  Eyes: Negative for pain and visual disturbance  Respiratory: Negative for cough and shortness of breath  Cardiovascular: Negative for chest pain and palpitations  Gastrointestinal: Negative for abdominal pain and vomiting  Genitourinary: Negative for dysuria and hematuria  Musculoskeletal: Negative for arthralgias and back pain  Skin: Negative for color change and rash  Neurological: Negative for seizures and syncope  All other systems reviewed and are negative  Objective:      /84 (BP Location: Left arm, Patient Position: Sitting, Cuff Size: Large)   Pulse 86   Temp 97 9 °F (36 6 °C) (Temporal)   Ht 4' 10" (1 473 m)   Wt 87 5 kg (193 lb)   SpO2 98%   BMI 40 34 kg/m²     No results found for this or any previous visit (from the past 1344 hour(s))  Physical Exam  Vitals and nursing note reviewed  Constitutional:       General: She is not in acute distress  Appearance: She is well-developed  She is obese  HENT:      Head: Normocephalic and atraumatic  Nose: Nose normal  No congestion  Mouth/Throat:      Mouth: Mucous membranes are moist       Pharynx: Oropharynx is clear  Eyes:      General: No scleral icterus  Extraocular Movements: Extraocular movements intact  Conjunctiva/sclera: Conjunctivae normal    Cardiovascular:      Rate and Rhythm: Normal rate and regular rhythm  Pulses: Normal pulses  Heart sounds: Normal heart sounds  No murmur heard  Pulmonary:      Effort: Pulmonary effort is normal  No respiratory distress  Breath sounds: Normal breath sounds  Abdominal:      General: Bowel sounds are normal       Palpations: Abdomen is soft  Tenderness:  There is no abdominal tenderness  Musculoskeletal:         General: No swelling or tenderness  Normal range of motion  Cervical back: Neck supple  Skin:     General: Skin is warm and dry  Capillary Refill: Capillary refill takes less than 2 seconds  Neurological:      General: No focal deficit present  Mental Status: She is alert and oriented to person, place, and time     Psychiatric:         Mood and Affect: Mood normal          Behavior: Behavior normal

## 2022-01-28 NOTE — PROGRESS NOTES
[facility] INTERNAL MEDICINE    NAME: Valera Nissen  AGE: 57 y o  SEX: female  : 1960     DATE: 2022     Assessment and Plan:     Problem List Items Addressed This Visit        Endocrine    Diabetes mellitus ([facility] )    Relevant Medications    Dulaglutide 3 MG/0 5ML SOPN    Janumet  MG per tablet    Uncontrolled type 2 diabetes mellitus with hyperglycemia (HCC) - Primary    Relevant Medications    Dulaglutide 3 MG/0 5ML SOPN    Janumet  MG per tablet    Other Relevant Orders    CBC and differential    Comprehensive metabolic panel    Hemoglobin A1C    Lipid Panel with Direct LDL reflex    Microalbumin / creatinine urine ratio    TSH, 3rd generation       Respiratory    Moderate persistent asthma without complication       Cardiovascular and Mediastinum    Essential hypertension    Relevant Medications    losartan-hydrochlorothiazide (HYZAAR) 100-25 MG per tablet    verapamil (VERELAN) 240 MG 24 hr capsule    Varicose veins of both lower extremities with pain       Other    Mixed hyperlipidemia    Relevant Medications    rosuvastatin (CRESTOR) 10 MG tablet      Other Visit Diagnoses     Class 2 severe obesity due to excess calories with serious comorbidity in adult, unspecified BMI ([place] [address] )        Annual physical exam              Immunizations and preventive care screenings were discussed with patient today  Appropriate education was printed on patient's after visit summary  Counseling:  · Exercise: the importance of regular exercise/physical activity was discussed  Recommend exercise 3-5 times per week for at least 30 minutes  Depression Screening and Follow-up Plan: Patient was screened for depression during today's encounter  They screened negative with a PHQ-2 score of 0  Return in about 10 weeks (around 2022)       Chief Complaint:     Chief Complaint   Patient presents with    Annual Exam     pt is here for 3 month f/u      History of Present Illness:     Adult Annual Physical   Patient here for a comprehensive physical exam  The patient reports no problems  Diet and Physical Activity  · Diet/Nutrition: diabetic diet  · Exercise: moderate cardiovascular exercise  Depression Screening  PHQ-2/9 Depression Screening    Little interest or pleasure in doing things: 0 - not at all  Feeling down, depressed, or hopeless: 0 - not at all  PHQ-2 Score: 0  PHQ-2 Interpretation: Negative depression screen       General Health  · Sleep: sleeps well  · Hearing: normal - bilateral   · Vision: vision problems: planning to see eye doctor  · Dental: no dental visits for >1 year  /GYN Health  · Patient is: postmenopausal  · Last menstrual period: -  · Contraceptive method: -  Review of Systems:     Review of Systems   Constitutional: Negative for chills and fever  HENT: Negative for congestion, ear pain and sore throat  Eyes: Negative for pain  Respiratory: Negative for cough and shortness of breath  Cardiovascular: Negative for chest pain and leg swelling  Gastrointestinal: Negative for abdominal pain, nausea and vomiting  Endocrine: Negative for polyuria  Genitourinary: Negative for difficulty urinating, frequency and urgency  Musculoskeletal: Negative for arthralgias and back pain  Skin: Negative for rash  Neurological: Negative for weakness and headaches  Psychiatric/Behavioral: Negative for sleep disturbance  The patient is not nervous/anxious         Past Medical History:     Past Medical History:   Diagnosis Date    Diabetes mellitus ([facility] [address] )     Hypercholesterolemia     Hypertension     Rheumatoid arthritis ([facility] [address] )     Sarcoidosis       Past Surgical History:     Past Surgical History:   Procedure Laterality Date    CHOLECYSTECTOMY      COLONOSCOPY  05/15/2009    Abnormal     MAMMO (HISTORICAL)  09/30/2016    Negative       Social History:     Social History     Socioeconomic History    Marital status: /Civil Union     Spouse name: None    Number of children: None    Years of education: None    Highest education level: None   Occupational History    None   Tobacco Use    Smoking status: Never Smoker    Smokeless tobacco: Never Used   Vaping Use    Vaping Use: Never used   Substance and Sexual Activity    Alcohol use: Not Currently    Drug use: Never    Sexual activity: Yes     Partners: Male   Other Topics Concern    None   Social History Narrative    Family: 3 brothers, 3 sisters, 3 sons, 3 daughters    Caffeine: Yes     Social Determinants of Health     Financial Resource Strain: Not on file   Food Insecurity: Not on file   Transportation Needs: Not on file   Physical Activity: Not on file   Stress: Not on file   Social Connections: Not on file   Intimate Partner Violence: Not on file   Housing Stability: Not on file      Family History:     Family History   Problem Relation Age of Onset    Diabetes Mother     Coronary artery disease Father     Diabetes Maternal Grandmother     Stomach cancer Maternal Grandfather       Current Medications:     Current Outpatient Medications   Medication Sig Dispense Refill    Dulaglutide 3 MG/0 5ML SOPN Inject 0 5 mL (3 mg total) under the skin once a week 6 mL 1    Insulin Pen Needle 29G X 10MM MISC Use once a week 13 each 1    Janumet  MG per tablet Take 1 tablet by mouth 2 (two) times a day with meals 180 tablet 1    losartan-hydrochlorothiazide (HYZAAR) 100-25 MG per tablet Take 1 tablet by mouth daily 90 tablet 1    rosuvastatin (CRESTOR) 10 MG tablet Take 1 tablet (10 mg total) by mouth daily 90 tablet 1    UltiCare Mini Pen Needles 31G X 6 MM MISC       verapamil (VERELAN) 240 MG 24 hr capsule Take 1 capsule (240 mg total) by mouth daily at bedtime 90 capsule 1     No current facility-administered medications for this visit  Allergies:      Allergies   Allergen Reactions    Sulfa Antibiotics Physical Exam:     /84 (BP Location: Left arm, Patient Position: Sitting, Cuff Size: Large)   Pulse 86   Temp 97 9 °F (36 6 °C) (Temporal)   Ht 4' 10" (1 473 m)   Wt 87 5 kg (193 lb)   SpO2 98%   BMI 40 34 kg/m²     Physical Exam  Vitals and nursing note reviewed  Constitutional:       General: She is not in acute distress  Appearance: She is well-developed  HENT:      Head: Normocephalic and atraumatic  Right Ear: Tympanic membrane, ear canal and external ear normal       Left Ear: Tympanic membrane, ear canal and external ear normal       Mouth/Throat:      Mouth: Mucous membranes are moist       Pharynx: Oropharynx is clear  Eyes:      Extraocular Movements: Extraocular movements intact  Conjunctiva/sclera: Conjunctivae normal       Pupils: Pupils are equal, round, and reactive to light  Cardiovascular:      Rate and Rhythm: Normal rate and regular rhythm  Heart sounds: No murmur heard  Pulmonary:      Effort: Pulmonary effort is normal  No respiratory distress  Breath sounds: Normal breath sounds  Abdominal:      Palpations: Abdomen is soft  Tenderness: There is no abdominal tenderness  Musculoskeletal:         General: Normal range of motion  Cervical back: Neck supple  Skin:     General: Skin is warm and dry  Neurological:      Mental Status: She is alert and oriented to person, place, and time            Shena Urban MD  [address] [place] [address] INTERNAL MEDICINE

## 2022-02-23 ENCOUNTER — HOSPITAL ENCOUNTER (EMERGENCY)
Facility: HOSPITAL | Age: 62
Discharge: HOME | End: 2022-02-23
Attending: EMERGENCY MEDICINE
Payer: OTHER MISCELLANEOUS

## 2022-02-23 VITALS
RESPIRATION RATE: 18 BRPM | DIASTOLIC BLOOD PRESSURE: 82 MMHG | OXYGEN SATURATION: 98 % | HEART RATE: 102 BPM | TEMPERATURE: 98.2 F | SYSTOLIC BLOOD PRESSURE: 158 MMHG

## 2022-02-23 DIAGNOSIS — T30.0 FIRST DEGREE BURN INJURY: Primary | ICD-10-CM

## 2022-02-23 PROCEDURE — 99284 EMERGENCY DEPT VISIT MOD MDM: CPT | Performed by: EMERGENCY MEDICINE

## 2022-02-23 PROCEDURE — 99283 EMERGENCY DEPT VISIT LOW MDM: CPT

## 2022-02-23 RX ORDER — GINSENG 100 MG
1 CAPSULE ORAL ONCE
Status: COMPLETED | OUTPATIENT
Start: 2022-02-23 | End: 2022-02-23

## 2022-02-23 RX ORDER — IBUPROFEN 600 MG/1
600 TABLET ORAL ONCE
Status: COMPLETED | OUTPATIENT
Start: 2022-02-23 | End: 2022-02-23

## 2022-02-23 RX ADMIN — IBUPROFEN 600 MG: 600 TABLET ORAL at 05:50

## 2022-02-23 RX ADMIN — BACITRACIN 1 SMALL APPLICATION: 500 OINTMENT TOPICAL at 05:50
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
## 2022-04-01 ENCOUNTER — RA CDI HCC (OUTPATIENT)
Dept: OTHER | Facility: HOSPITAL | Age: 62
End: 2022-04-01

## 2022-04-01 NOTE — PROGRESS NOTES
[facility]  coding opportunities       Chart reviewed, no opportunity found: CHART REVIEWED, NO OPPORTUNITY FOUND        Patients Insurance        Commercial Insurance: [employer]

## 2022-04-08 ENCOUNTER — OFFICE VISIT (OUTPATIENT)
Dept: INTERNAL MEDICINE CLINIC | Facility: CLINIC | Age: 62
End: 2022-04-08
Payer: COMMERCIAL

## 2022-04-08 VITALS
OXYGEN SATURATION: 100 % | BODY MASS INDEX: 39.04 KG/M2 | SYSTOLIC BLOOD PRESSURE: 134 MMHG | WEIGHT: 186 LBS | TEMPERATURE: 97.8 F | DIASTOLIC BLOOD PRESSURE: 82 MMHG | HEIGHT: 58 IN | HEART RATE: 86 BPM

## 2022-04-08 DIAGNOSIS — Z12.31 ENCOUNTER FOR SCREENING MAMMOGRAM FOR MALIGNANT NEOPLASM OF BREAST: ICD-10-CM

## 2022-04-08 DIAGNOSIS — M17.0 PRIMARY OSTEOARTHRITIS OF BOTH KNEES: ICD-10-CM

## 2022-04-08 DIAGNOSIS — M75.82 ROTATOR CUFF TENDONITIS, LEFT: ICD-10-CM

## 2022-04-08 DIAGNOSIS — E11.65 UNCONTROLLED TYPE 2 DIABETES MELLITUS WITH HYPERGLYCEMIA (HCC): Primary | ICD-10-CM

## 2022-04-08 DIAGNOSIS — I83.813 VARICOSE VEINS OF BOTH LOWER EXTREMITIES WITH PAIN: ICD-10-CM

## 2022-04-08 DIAGNOSIS — E78.2 MIXED HYPERLIPIDEMIA: ICD-10-CM

## 2022-04-08 DIAGNOSIS — I10 ESSENTIAL HYPERTENSION: ICD-10-CM

## 2022-04-08 PROCEDURE — 3008F BODY MASS INDEX DOCD: CPT | Performed by: INTERNAL MEDICINE

## 2022-04-08 PROCEDURE — 1036F TOBACCO NON-USER: CPT | Performed by: INTERNAL MEDICINE

## 2022-04-08 PROCEDURE — 3079F DIAST BP 80-89 MM HG: CPT | Performed by: INTERNAL MEDICINE

## 2022-04-08 PROCEDURE — 99214 OFFICE O/P EST MOD 30 MIN: CPT | Performed by: INTERNAL MEDICINE

## 2022-04-08 PROCEDURE — 3075F SYST BP GE 130 - 139MM HG: CPT | Performed by: INTERNAL MEDICINE

## 2022-04-08 RX ORDER — LOSARTAN POTASSIUM AND HYDROCHLOROTHIAZIDE 25; 100 MG/1; MG/1
1 TABLET ORAL DAILY
Qty: 90 TABLET | Refills: 1 | Status: SHIPPED | OUTPATIENT
Start: 2022-04-08

## 2022-04-08 RX ORDER — ROSUVASTATIN CALCIUM 10 MG/1
10 TABLET, COATED ORAL DAILY
Qty: 90 TABLET | Refills: 1 | Status: SHIPPED | OUTPATIENT
Start: 2022-04-08

## 2022-04-08 RX ORDER — MELOXICAM 15 MG/1
15 TABLET ORAL DAILY
Qty: 30 TABLET | Refills: 0 | Status: SHIPPED | OUTPATIENT
Start: 2022-04-08

## 2022-04-08 NOTE — PROGRESS NOTES
Assessment/Plan:             1  Uncontrolled type 2 diabetes mellitus with hyperglycemia ([facility] [address] )  -     Ambulatory Referral to Ophthalmology; Future    2  Essential hypertension  -     losartan-hydrochlorothiazide (HYZAAR) 100-25 MG per tablet; Take 1 tablet by mouth daily    3  Mixed hyperlipidemia  -     rosuvastatin (CRESTOR) 10 MG tablet; Take 1 tablet (10 mg total) by mouth daily    4  Varicose veins of both lower extremities with pain    5  Primary osteoarthritis of both knees    6  Rotator cuff tendonitis, left  -     meloxicam (Mobic) 15 mg tablet; Take 1 tablet (15 mg total) by mouth daily  -     Ambulatory referral to Physical Therapy; Future  -     XR shoulder 2+ vw left; Future; Expected date: 04/08/2022    7  Encounter for screening mammogram for malignant neoplasm of breast  -     Mammo screening bilateral w cad; Future; Expected date: 04/08/2022           Subjective:      Patient ID: Rossana Harper is a 57 y o  female  Follow-up on blood test done on 04/04/2022 test discussed with her, left shoulder pain, no trauma      The following portions of the patient's history were reviewed and updated as appropriate: She  has a past medical history of Diabetes mellitus ([address] ), Hypercholesterolemia, Hypertension, Rheumatoid arthritis ([address] ), and Sarcoidosis    She   Patient Active Problem List    Diagnosis Date Noted    Rotator cuff tendonitis, left 04/08/2022    Nausea 05/18/2021    COVID-19 virus detected 05/18/2021    Mixed hyperlipidemia 05/18/2021    Essential hypertension, benign 04/21/2021    Primary osteoarthritis of both knees 04/21/2021    Varicose veins of both lower extremities with pain 03/30/2021    Body mass index 39 0-39 9, adult 03/30/2021    Uncontrolled type 2 diabetes mellitus with hyperglycemia ([facility] [address] ) 03/30/2021    Encounter for screening mammogram for malignant neoplasm of breast 03/30/2021    Encounter for screening colonoscopy 03/30/2021    Moderate persistent asthma without complication 78/52/0040    Exposure to SARS-associated coronavirus 09/10/2020    Allergic rhinitis 03/18/2020    Asthma 03/18/2020    Hyperlipidemia 03/18/2020    Sarcoidosis 03/18/2020    Thalassemia 03/18/2020    Vitamin D deficiency 03/18/2020    Diabetes mellitus ([facility] [address] ) 03/14/2019    Essential hypertension 03/14/2019     She  has a past surgical history that includes Colonoscopy (05/15/2009); Cholecystectomy; and Mammo (historical) (09/30/2016)  Her family history includes Coronary artery disease in her father; Diabetes in her maternal grandmother and mother; Stomach cancer in her maternal grandfather  She  reports that she has never smoked  She has never used smokeless tobacco  She reports previous alcohol use  She reports that she does not use drugs  Current Outpatient Medications   Medication Sig Dispense Refill    Dulaglutide 3 MG/0 5ML SOPN Inject 0 5 mL (3 mg total) under the skin once a week 6 mL 1    Insulin Pen Needle 29G X 10MM MISC Use once a week 13 each 1    Janumet  MG per tablet Take 1 tablet by mouth 2 (two) times a day with meals 180 tablet 1    losartan-hydrochlorothiazide (HYZAAR) 100-25 MG per tablet Take 1 tablet by mouth daily 90 tablet 1    UltiCare Mini Pen Needles 31G X 6 MM MISC       verapamil (VERELAN) 240 MG 24 hr capsule Take 1 capsule (240 mg total) by mouth daily at bedtime 90 capsule 1    meloxicam (Mobic) 15 mg tablet Take 1 tablet (15 mg total) by mouth daily 30 tablet 0    rosuvastatin (CRESTOR) 10 MG tablet Take 1 tablet (10 mg total) by mouth daily 90 tablet 1     No current facility-administered medications for this visit       Current Outpatient Medications on File Prior to Visit   Medication Sig    Dulaglutide 3 MG/0 5ML SOPN Inject 0 5 mL (3 mg total) under the skin once a week    Insulin Pen Needle 29G X 10MM MISC Use once a week    Janumet  MG per tablet Take 1 tablet by mouth 2 (two) times a day with meals    UltiCare Mini Pen Needles 31G X 6 MM MISC     verapamil (VERELAN) 240 MG 24 hr capsule Take 1 capsule (240 mg total) by mouth daily at bedtime    [DISCONTINUED] losartan-hydrochlorothiazide (HYZAAR) 100-25 MG per tablet Take 1 tablet by mouth daily    [DISCONTINUED] rosuvastatin (CRESTOR) 10 MG tablet Take 1 tablet (10 mg total) by mouth daily (Patient not taking: Reported on 4/8/2022 )     No current facility-administered medications on file prior to visit  She is allergic to sulfa antibiotics       Review of Systems   Constitutional: Negative for chills and fever  HENT: Negative for congestion, ear pain and sore throat  Eyes: Negative for pain  Respiratory: Negative for cough and shortness of breath  Cardiovascular: Negative for chest pain and leg swelling  Gastrointestinal: Negative for abdominal pain, nausea and vomiting  Endocrine: Negative for polyuria  Genitourinary: Negative for difficulty urinating, frequency and urgency  Musculoskeletal: Positive for arthralgias  Negative for back pain  Skin: Negative for rash  Neurological: Negative for weakness and headaches  Psychiatric/Behavioral: Negative for sleep disturbance  The patient is not nervous/anxious  Objective:      /82 (BP Location: Left arm, Patient Position: Sitting, Cuff Size: Adult)   Pulse 86   Temp 97 8 °F (36 6 °C) (Temporal)   Ht 4' 10" (1 473 m)   Wt 84 4 kg (186 lb)   SpO2 100%   BMI 38 87 kg/m²     Recent Results (from the past 1344 hour(s))   HEMOGLOBIN A1C    Collection Time: 04/04/22 10:39 AM   Result Value Ref Range    Hemoglobin A1C 7 1 (H) <5 7 %    eAG, EST AVG Glucose 157 (H) <154 mg/dL        Physical Exam  Constitutional:       Appearance: Normal appearance  HENT:      Head: Normocephalic  Right Ear: Tympanic membrane, ear canal and external ear normal       Left Ear: Tympanic membrane, ear canal and external ear normal       Nose: Nose normal  No congestion        Mouth/Throat:      Mouth: Mucous membranes are moist       Pharynx: Oropharynx is clear  No oropharyngeal exudate or posterior oropharyngeal erythema  Eyes:      Extraocular Movements: Extraocular movements intact  Conjunctiva/sclera: Conjunctivae normal       Pupils: Pupils are equal, round, and reactive to light  Cardiovascular:      Rate and Rhythm: Normal rate and regular rhythm  Heart sounds: Normal heart sounds  No murmur heard  Pulmonary:      Effort: Pulmonary effort is normal       Breath sounds: Normal breath sounds  No wheezing or rales  Abdominal:      General: Bowel sounds are normal  There is no distension  Palpations: Abdomen is soft  Tenderness: There is no abdominal tenderness  Musculoskeletal:      Cervical back: Normal range of motion and neck supple  Right lower leg: No edema  Left lower leg: No edema  Comments: Left shoulder exam-laterally tenderness present, movement restricted and painful, restricted abduction   Lymphadenopathy:      Cervical: No cervical adenopathy  Skin:     General: Skin is warm  Neurological:      General: No focal deficit present  Mental Status: She is alert and oriented to person, place, and time

## 2022-04-08 NOTE — PROGRESS NOTES
Diabetic Foot Exam    Patient's shoes and socks removed  Right Foot/Ankle   Right Foot Inspection  Skin Exam: skin normal and dry skin  Skin not intact, no warmth, no callus, no erythema, no maceration, no abnormal color, no pre-ulcer, no ulcer and no callus  Toe Exam: No swelling    Sensory   Monofilament testing: intact    Vascular  Capillary refills: < 3 seconds  The right DP pulse is 2+  The right PT pulse is 2+  Left Foot/Ankle  Left Foot Inspection  Skin Exam: skin normal and dry skin  Skin not intact, no warmth, no erythema, no maceration, normal color, no pre-ulcer, no ulcer and no callus  Toe Exam: No swelling  Sensory   Monofilament testing: intact    Vascular  Capillary refills: < 3 seconds  The left DP pulse is 2+  The left PT pulse is 2+       Assign Risk Category  No deformity present  No loss of protective sensation  No weak pulses  Risk: 0

## 2022-04-20 ENCOUNTER — HOSPITAL ENCOUNTER (OUTPATIENT)
Dept: RADIOLOGY | Facility: HOSPITAL | Age: 62
Discharge: HOME | End: 2022-04-20
Attending: INTERNAL MEDICINE
Payer: COMMERCIAL

## 2022-04-20 DIAGNOSIS — M75.82 ROTATOR CUFF TENDONITIS, LEFT: ICD-10-CM

## 2022-04-20 PROCEDURE — 73030 X-RAY EXAM OF SHOULDER: CPT

## 2022-04-21 ENCOUNTER — EVALUATION (OUTPATIENT)
Dept: PHYSICAL THERAPY | Facility: REHABILITATION | Age: 62
End: 2022-04-21
Payer: COMMERCIAL

## 2022-04-21 DIAGNOSIS — M75.82 ROTATOR CUFF TENDONITIS, LEFT: Primary | ICD-10-CM

## 2022-04-21 PROCEDURE — 97161 PT EVAL LOW COMPLEX 20 MIN: CPT

## 2022-04-21 PROCEDURE — 97110 THERAPEUTIC EXERCISES: CPT

## 2022-04-21 NOTE — PROGRESS NOTES
PT Evaluation     Today's date: 2022  Patient name: Kameron Poon  : 1960  MRN: [id number]  Referring provider: Williams Aldana MD  Dx:   Encounter Diagnosis     ICD-10-CM    1  Rotator cuff tendonitis, left  M75 82 Ambulatory referral to Physical Therapy       Start Time: 1500  Stop Time: 1545  Total time in clinic (min): 45 minutes    Assessment  Assessment details: Kameron Poon is a pleasant 57 y o  female who presents with acute on chronic left shoulder pain  The primary movement problem is glenohumeral joint hypomobility resulting in pathoanatomical symptoms of rotator cuff tendonitis and limiting her ability to care for self, dress independently, lift, perform household chores, perform yard work, reach overhead and squat to  objects from the floor  No further referral appears necessary at this time based upon examination results  I expect she will return to her prior level of functio in 6-8 weeks  The patient's greatest concerns are concern at no signs of improvement, fear of not being able to keep active and future ill health (and wanting to prevent it)  Problem List:  1) Pain with OHAs  2) Pain with reaching behind the back    Etiologic factors include recent injury at work  Impairments: abnormal muscle firing, abnormal or restricted ROM, activity intolerance, impaired physical strength, lacks appropriate home exercise program and pain with function    Symptom irritability: moderateUnderstanding of Dx/Px/POC: good   Prognosis: good  Prognosis details: Positive prognostic indicators include positive attitude toward recovery, good understanding of diagnosis and treatment plan options and acuity of symptoms  Negative prognostic indicators include hypertension, DM, and obesity  Goals  Short Term Goals (Week 4):  1  Decreased pain by 50%  2  Demonstrate full AROM with <4/10  3   Improve strength by 1/2 measure in all deficient areas      Long Term Goals (8 weeks):  1  <2/10 pain with OHAs   2  Exceed FOTO predicted outcome score  3  Fully independent with HEP by discharge  4  Patient will be able to manage symptoms independently  Plan  Patient would benefit from: skilled physical therapy  Planned modality interventions: low level laser therapy and electrical stimulation/Russian stimulation  Planned therapy interventions: joint mobilization, manual therapy, massage, activity modification, behavior modification, neuromuscular re-education, patient education, home exercise program, graded activity, functional ROM exercises, flexibility, therapeutic activities, therapeutic exercise, stretching and strengthening  Frequency: 2x week  Duration in visits: 12  Duration in weeks: 6  Treatment plan discussed with: patient        Subjective Evaluation    History of Present Illness  Mechanism of injury: Patient reports left shoulder pain that was recently aggravated in the last month  Patient is localized to left shoulder and down into forearm  Patient reports shoulder pain has been going on for over 6 months  Patient denies any previous injuries/traumas/falls on the left shoulder  Patient denies any numbness/tingling  Pain is aggravated with reaching behind back and overhead activities  Patient works in a [employer]  Patient reports she cannot sleep on her left shoulder due to pain but not pain at night  Patient is RHD    Pain  Current pain ratin  At worst pain rating: 10  Quality: throbbing  Relieving factors: medications and rest (ibuprofen improves pain )    Patient Goals  Patient goals for therapy: decreased pain, increased motion, independence with ADLs/IADLs, increased strength and return to work  Patient goal: Be able to reach [place] without limitations and reach behind the back without limitations        Objective     Cervical/Thoracic Screen   Cervical range of motion within normal limits with the following exceptions: Pain with left and right rotation in the neck    Active Range of Motion Left Shoulder   Flexion: 100 degrees with pain  Abduction: 90 degrees with pain  External rotation BTH: C2   Internal rotation BTB: sacrum with pain    Right Shoulder   Normal active range of motion  External rotation BTH: T1   Internal rotation BTB: mid thoracic     Passive Range of Motion   Left Shoulder   Flexion: 120 degrees with pain  Abduction: 100 degrees with pain  External rotation 90°: 25 degrees with pain  Internal rotation 90°: 50 degrees     Right Shoulder   Flexion: 160 degrees   Abduction: 100 degrees   External rotation 90°: 90 degrees   Internal rotation 90°: 50 degrees     Strength/Myotome Testing     Left Shoulder     Planes of Motion   Flexion: 4+   External rotation at 0°: 4+   Internal rotation at 0°: 4+     Isolated Muscles   Biceps: 4+   Triceps: 4+     Right Shoulder     Planes of Motion   Flexion: 4+   External rotation at 0°: 4+   Internal rotation at 0°: 4+     Isolated Muscles   Biceps: 5   Triceps: 5     Additional Strength Details  Pain with all strength testing    Tests     Left Shoulder   Positive drop arm, Hawkin's, Madisyn/Acevedo and painful arc  Negative crossover                Precautions: DM      Manuals 4/21            PROM nv            GHJ mobs (post/infer) nv            GHJ Distraction nv                         Neuro Re-Ed 4/21            ER/IR Isometrics nv                                                                                          Ther Ex 4/21            Table Slides Flexion HEP            Wall Slides w/ Towel HEP            Supine Flexion AAROM w/ Cane HEP            Sidelying ER nv            Sidelying Flexion nv            ranger nv                         Pullies nv            Ther Activity 4/21                                      Gait Training                                       Modalities

## 2022-04-25 ENCOUNTER — OFFICE VISIT (OUTPATIENT)
Dept: PHYSICAL THERAPY | Facility: REHABILITATION | Age: 62
End: 2022-04-25
Payer: COMMERCIAL

## 2022-04-25 DIAGNOSIS — M75.82 ROTATOR CUFF TENDONITIS, LEFT: Primary | ICD-10-CM

## 2022-04-25 PROCEDURE — 97110 THERAPEUTIC EXERCISES: CPT

## 2022-04-25 NOTE — PROGRESS NOTES
Daily Note     Today's date: 2022  Patient name: Jacek Herrera  : 1960  MRN: [id number]  Referring provider: Dawood Palma MD  Dx:   Encounter Diagnosis     ICD-10-CM    1  Rotator cuff tendonitis, left  M75 82                   Subjective: Pt reports no change in symptoms of L shldr since IE  Notes she has been compliant with HEP  Objective: See treatment diary below      Assessment: Tolerated treatment poor - fair  Initiated POC as outlined below focusing primarily on ROM of L shldr   Tends to frequently guard during PROM requiring cues to relax  Limited tolerance to AAROM, noting slight increase in pain with these interventions  May tolerate S/L flex with ranger, better than against gravity, NV  Patient would benefit from continued PT to further improve pain free ROM, increase strength, and maximize overall function  Plan: Continue per plan of care        Precautions: DM      Manuals            PROM nv AFB           GHJ mobs (post/infer) nv nv           GHJ Distraction nv nv                        Neuro Re-Ed            ER/IR Isometrics nv nv                                                                                         Ther Ex            Table Slides Flexion HEP 5"x10           Wall Slides w/ Towel HEP            Supine Flexion AAROM w/ Cane HEP 5"x10           Sidelying ER nv 1x10           Sidelying Flexion nv nv           ranger nv 1x10                        Pullies nv 5 min           Ther Activity                                      Gait Training                                       Modalities

## 2022-04-28 ENCOUNTER — OFFICE VISIT (OUTPATIENT)
Dept: PHYSICAL THERAPY | Facility: REHABILITATION | Age: 62
End: 2022-04-28
Payer: COMMERCIAL

## 2022-04-28 DIAGNOSIS — M75.82 ROTATOR CUFF TENDONITIS, LEFT: Primary | ICD-10-CM

## 2022-04-28 PROCEDURE — 97110 THERAPEUTIC EXERCISES: CPT | Performed by: PHYSICAL THERAPIST

## 2022-04-28 PROCEDURE — 97140 MANUAL THERAPY 1/> REGIONS: CPT | Performed by: PHYSICAL THERAPIST

## 2022-04-28 NOTE — PROGRESS NOTES
Daily Note     Today's date: 2022  Patient name: Jacek Herrera  : 1960  MRN: [id number]  Referring provider: Dawood Palma MD  Dx:   Encounter Diagnosis     ICD-10-CM    1  Rotator cuff tendonitis, left  M75 82        Start Time: 1530  Stop Time: 1608  Total time in clinic (min): 38 minutes    Subjective: Patient reports she feels PT has helped but still is having pain lifting overhead  Objective: See treatment diary below  Tone increase and TTP pec minor, teres mj    Assessment: Tolerated treatment well  Patient reports less pain with flexion overhead post session  She also had ~ 20-30 deg improvement in overhead movement  She benefited from [facility] to pec minor and teres mj followed by AAROM flexion and wall slides  HEP updated with cane flexion and wall slides 1-2x/day  Plan: Continue per plan of care        Precautions: DM      Manuals           PROM nv AFB AB          GHJ mobs (post/infer) nv nv           GHJ Distraction nv nv           assessment   5'          STM raghav hackett/justin parrish   Static and dynamic          Neuro Re-Ed           ER/IR Isometrics nv nv                                                                                         Ther Ex           Table Slides Flexion HEP 5"x10           Wall Slides w/ Towel HEP            Supine Flexion AAROM w/ Cane HEP 5"x10 10x  2 sets          Sidelying ER nv 1x10           Sidelying Flexion nv nv           ranger nv 1x10           Wall slides flexion   2x10          Pullies nv 5 min           Ther Activity                                      Gait Training                                       Modalities

## 2022-05-02 ENCOUNTER — OFFICE VISIT (OUTPATIENT)
Dept: PHYSICAL THERAPY | Facility: REHABILITATION | Age: 62
End: 2022-05-02
Payer: COMMERCIAL

## 2022-05-02 DIAGNOSIS — M75.82 ROTATOR CUFF TENDONITIS, LEFT: Primary | ICD-10-CM

## 2022-05-02 PROCEDURE — 97110 THERAPEUTIC EXERCISES: CPT

## 2022-05-02 PROCEDURE — 97140 MANUAL THERAPY 1/> REGIONS: CPT

## 2022-05-05 ENCOUNTER — OFFICE VISIT (OUTPATIENT)
Dept: PHYSICAL THERAPY | Facility: REHABILITATION | Age: 62
End: 2022-05-05
Payer: COMMERCIAL

## 2022-05-05 DIAGNOSIS — M75.82 ROTATOR CUFF TENDONITIS, LEFT: Primary | ICD-10-CM

## 2022-05-05 PROCEDURE — 97112 NEUROMUSCULAR REEDUCATION: CPT

## 2022-05-05 PROCEDURE — 97110 THERAPEUTIC EXERCISES: CPT

## 2022-05-05 PROCEDURE — 97140 MANUAL THERAPY 1/> REGIONS: CPT

## 2022-05-05 NOTE — PROGRESS NOTES
Daily Note     Today's date: 2022  Patient name: Paz Plummer  : 1960  MRN: [id number]  Referring provider: Pollo Madrigal MD  Dx:   Encounter Diagnosis     ICD-10-CM    1  Rotator cuff tendonitis, left  M75 82        Start Time:   Stop Time: 7604  Total time in clinic (min): 45 minutes    Subjective: Patient reports her anterior shoulder/chest is sore after a massage yesterday  Objective: See treatment diary below  PROM  ER at 90/: 10 (start) - 25 (end)    Assessment: Good tolerance for activities today  Pain with palpation of posterior cuff and anterior shoulder and chest  Improved pain levels and shoulder flexion and abduction AROM end of session  Patient would benefit from continued PT      Plan: Continue per plan of care        Precautions: DM      Manuals  5        PROM nv [place]B AB [place]        GHJ mobs (post/infer) nv nv   [place] post and inf        GHJ Distraction nv nv           assessment   5'          STM pec mi/teres mj   Static and dynamic Static and dynamic STM post cuff        Neuro Re-Ed  5/        ER/IR Isometrics nv nv           Crossover Symmetry Rows    Grn 2x10 grn 3x10        Crossover Symmetry Exts     grn 3x10                                                            Ther Ex  5/5        Table Slides Flexion HEP 5"x10           Wall Slides w/ Towel HEP            Supine Flexion AAROM w/ Cane HEP 5"x10 10x  2 sets 2x10 flex/ER         Sidelying ER nv 1x10   2x10 1#        Sidelying Flexion nv nv           ranger nv 1x10           Wall slides flexion   2x10 2x10 3x10        Pullies nv 5 min  5' 5'        Ther Activity                                      Gait Training                                       Modalities

## 2022-05-09 ENCOUNTER — OFFICE VISIT (OUTPATIENT)
Dept: PHYSICAL THERAPY | Facility: REHABILITATION | Age: 62
End: 2022-05-09
Payer: COMMERCIAL

## 2022-05-09 DIAGNOSIS — M75.82 ROTATOR CUFF TENDONITIS, LEFT: Primary | ICD-10-CM

## 2022-05-09 PROCEDURE — 97112 NEUROMUSCULAR REEDUCATION: CPT

## 2022-05-09 PROCEDURE — 97110 THERAPEUTIC EXERCISES: CPT

## 2022-05-09 PROCEDURE — 97140 MANUAL THERAPY 1/> REGIONS: CPT

## 2022-05-09 NOTE — PROGRESS NOTES
Daily Note     Today's date: 2022  Patient name: Kylah Miller  : 1960  MRN: [id number]  Referring provider: Jenelle Vieira MD  Dx:   Encounter Diagnosis     ICD-10-CM    1  Rotator cuff tendonitis, left  M75 82        Start Time: 1530  Stop Time: 1615  Total time in clinic (min): 45 minutes    Subjective: Patient reports shoulder is feeling better today  Objective: See treatment diary below  Left Shoulder AROM  Flexion: 120  Abd: 120    Left Shoulder PROM  Flexion 140  Abd: 120    Assessment: Patient demonstrated significant improvements in flexion AROM with minimal discomfort  Good tolerance for all activities  TTP in proximal bicep tendon and posterior cuff  Good response to manuals with improved AROM and pain levels with active flexion  Patient progressing nicely  Patient would benefit from continued PT      Plan: Continue per plan of care        Precautions: DM      Manuals        PROM nv AFB AB MT MT MT       GHJ mobs (post/infer) nv nv   MT post and inf        GHJ Distraction nv nv           assessment   5'          STM pec mi/justin mj   Static and dynamic Static and dynamic STM post cuff STM bicep/post cuff       Neuro Re-Ed  5       ER/IR Isometrics nv nv           Crossover Symmetry Rows    Grn 2x10 grn 3x10 grn 3x10       Crossover Symmetry Exts     grn 3x10 grn 3x10                                                           Ther Ex  5 5       Table Slides Flexion HEP 5"x10           Wall Slides w/ Towel HEP            Supine Flexion AAROM w/ Cane HEP 5"x10 10x  2 sets 2x10 flex/ER  10"x10 abduction       Sidelying ER nv 1x10   2x10 1# 3x10 1#       Sidelying Flexion nv nv           ranger nv 1x10           Wall slides flexion   2x10 2x10 3x10 3x10       Pullies nv 5 min  5' 5' 5'       Ther Activity                                      Gait Training                                       Modalities

## 2022-05-12 ENCOUNTER — OFFICE VISIT (OUTPATIENT)
Dept: PHYSICAL THERAPY | Facility: REHABILITATION | Age: 62
End: 2022-05-12
Payer: COMMERCIAL

## 2022-05-12 DIAGNOSIS — M75.82 ROTATOR CUFF TENDONITIS, LEFT: Primary | ICD-10-CM

## 2022-05-12 PROCEDURE — 97112 NEUROMUSCULAR REEDUCATION: CPT

## 2022-05-12 PROCEDURE — 97110 THERAPEUTIC EXERCISES: CPT

## 2022-05-12 PROCEDURE — 97140 MANUAL THERAPY 1/> REGIONS: CPT

## 2022-05-12 NOTE — PROGRESS NOTES
Daily Note     Today's date: 2022  Patient name: Sarah Glaser  : 1960  MRN: [id number]  Referring provider: Kyler Lama MD  Dx:   Encounter Diagnosis     ICD-10-CM    1  Rotator cuff tendonitis, left  M75 82        Start Time: 1600  Stop Time: 1645  Total time in clinic (min): 45 minutes    Subjective: Patient reports shoulder is doing well  Patient reports overall 50% improvement at this time  Objective: See treatment diary below  Left Shoulder AROM  Flexion: 130 minimal Pain    Left Shoulder PROM  25% limitations with flexion and abduction with pain at end range  50% limitations with ER    Assessment: Patient progressing nicely  Less overall pain levels throughout the day and better functional ability  Pain still localized to anterolateral shoulder with end range motions  Limitations with flexion and ER  Patient would benefit from continued PT      Plan: Continue per plan of care        Precautions: DM      Manuals       PROM nv AFB AB OK OK OK OK      GHJ mobs (post/infer) nv nv   [place] post and inf        GHJ Distraction nv nv           assessment   5'          STM pec mi/teres mj   Static and dynamic Static and dynamic STM post cuff STM bicep/post cuff STM bicep/post cuff      Neuro Re-Ed  5/      ER/IR Isometrics nv nv           Crossover Symmetry Rows    Grn 2x10 grn 3x10 grn 3x10 prp 3x10      Crossover Symmetry Exts     grn 3x10 grn 3x10 prp 3x10                                                          Ther Ex  5/5       Table Slides Flexion HEP 5"x10           Wall Slides w/ Towel HEP            Supine Flexion AAROM w/ Cane HEP 5"x10 10x  2 sets 2x10 flex/ER  10"x10 abduction 10"x10 abduction/flexion      Sidelying ER nv 1x10   2x10 1# 3x10 1# 3x10 1#      Sidelying Flexion nv nv           ranger nv 1x10           Wall slides flexion   2x10 2x10 3x10 3x10 3x10      Pullies nv 5 min  5' 5' 5' 6'      Ther Activity 4/21 4/25                                     Gait Training                                       Modalities

## 2022-05-16 ENCOUNTER — OFFICE VISIT (OUTPATIENT)
Dept: PHYSICAL THERAPY | Facility: REHABILITATION | Age: 62
End: 2022-05-16
Payer: COMMERCIAL

## 2022-05-16 DIAGNOSIS — M75.82 ROTATOR CUFF TENDONITIS, LEFT: Primary | ICD-10-CM

## 2022-05-16 PROCEDURE — 97112 NEUROMUSCULAR REEDUCATION: CPT

## 2022-05-16 PROCEDURE — 97110 THERAPEUTIC EXERCISES: CPT

## 2022-05-16 PROCEDURE — 97140 MANUAL THERAPY 1/> REGIONS: CPT

## 2022-05-16 NOTE — PROGRESS NOTES
Daily Note     Today's date: 2022  Patient name: Rosalinda Maradiaga  : 1960  MRN: [id number]  Referring provider: Juanjose Barr MD  Dx:   Encounter Diagnosis     ICD-10-CM    1  Rotator cuff tendonitis, left  M75 82        Start Time: 1530  Stop Time: 1615  Total time in clinic (min): 45 minutes    Subjective: Patient reports pain with laying on her left side      Objective: See treatment diary below      Assessment: Patient demonstrated pain at end range with flexion, abduction, and ER at 90  Restricted shoulder flexion and ER at 90 passively  Positive median nerve ULTT for pain and tightness  Good tolerance for activities today  Continue working on restoring full PROM and normalizing scapular rhythm  Patient would benefit from continued PT      Plan: Continue per plan of care        Precautions: DM      Manuals  5/     PROM nv AFB AB KS KS KS KS KS     GHJ mobs (post/infer) nv nv   [place] post and inf        GHJ Distraction nv nv           assessment   5'          Median Nerve Tensioners        2rds 30x     STM pec mi/teres mj   Static and dynamic Static and dynamic STM post cuff STM bicep/post cuff STM bicep/post cuff stm post cuff     Neuro Re-Ed / 5/     ER/IR Isometrics nv nv           Crossover Symmetry Rows    Grn 2x10 grn 3x10 grn 3x10 prp 3x10 prp 3x10     Crossover Symmetry Exts     grn 3x10 grn 3x10 prp 3x10 prp 3x10                                                         Ther Ex  5/2 5/5      Table Slides Flexion HEP 5"x10           Wall Slides w/ Towel HEP            Supine Flexion AAROM w/ Cane HEP 5"x10 10x  2 sets 2x10 flex/ER  10"x10 abduction 10"x10 abduction/flexion 10"x10 abduction/flexion     Sidelying ER nv 1x10   2x10 1# 3x10 1# 3x10 1# 3x10 1#     Sidelying Flexion nv nv           ranger nv 1x10           Wall slides flexion   2x10 2x10 3x10 3x10 3x10 3x10     Pullies nv 5 min  5' 5' 5' 6' 6'     Ther Activity 4/21 4/25                                     Gait Training                                       Modalities

## 2022-05-19 ENCOUNTER — OFFICE VISIT (OUTPATIENT)
Dept: PHYSICAL THERAPY | Facility: REHABILITATION | Age: 62
End: 2022-05-19
Payer: COMMERCIAL

## 2022-05-19 DIAGNOSIS — M75.82 ROTATOR CUFF TENDONITIS, LEFT: Primary | ICD-10-CM

## 2022-05-19 PROCEDURE — 97140 MANUAL THERAPY 1/> REGIONS: CPT

## 2022-05-19 PROCEDURE — 97112 NEUROMUSCULAR REEDUCATION: CPT

## 2022-05-19 PROCEDURE — 97110 THERAPEUTIC EXERCISES: CPT

## 2022-05-19 NOTE — PROGRESS NOTES
Daily Note     Today's date: 2022  Patient name: Jacek Herrera  : 1960  MRN: [id number]  Referring provider: Dawood Palma MD  Dx:   Encounter Diagnosis     ICD-10-CM    1  Rotator cuff tendonitis, left  M75 82        Start Time:   Stop Time: 1531  Total time in clinic (min): 45 minutes    Subjective: Patient reports shoulder pain levels and function is improving  Objective: See treatment diary below      Assessment: Good tolerance for activities today with mild discomfort  PROM is slowly improving with pain at end range in all directions, greatest limitations with ER and flexion  Patient would benefit from continued PT      Plan: Continue per plan of care        Precautions: DM      Manuals     PROM nv AFB AB SD SD SD SD SD SD    GHJ mobs (post/infer) nv nv   SD post and inf    SD Gr3-4 post and inf    GHJ Distraction nv nv           assessment   5'          Median Nerve Tensioners        2rds 30x 2rds 30x    STM pec mi/teres mj   Static and dynamic Static and dynamic STM post cuff STM bicep/post cuff STM bicep/post cuff stm post cuff stm post    Neuro Re-Ed     ER/IR Isometrics nv nv           Crossover Symmetry Rows    Grn 2x10 grn 3x10 grn 3x10 prp 3x10 prp 3x10 prp 3x10    Crossover Symmetry Exts     grn 3x10 grn 3x10 prp 3x10 prp 3x10 prp 3x10                                                        Ther Ex  5/2 5/5     Table Slides Flexion HEP 5"x10           Wall Slides w/ Towel HEP            Supine Flexion AAROM w/ Cane HEP 5"x10 10x  2 sets 2x10 flex/ER  10"x10 abduction 10"x10 abduction/flexion 10"x10 abduction/flexion 10"x10 abduction/flexion    Sidelying ER nv 1x10   2x10 1# 3x10 1# 3x10 1# 3x10 1# 3x10 1#    Sidelying Flexion nv nv           ranger nv 1x10           Wall slides flexion   2x10 2x10 3x10 3x10 3x10 3x10 3x10    Pullies nv 5 min  5' 5' 5' 6' 6' 6'    Ther Activity 4/21 4/25                                     Gait Training                                       Modalities

## 2022-05-20 DIAGNOSIS — E11.65 UNCONTROLLED TYPE 2 DIABETES MELLITUS WITH HYPERGLYCEMIA (HCC): ICD-10-CM

## 2022-05-23 ENCOUNTER — OFFICE VISIT (OUTPATIENT)
Dept: PHYSICAL THERAPY | Facility: REHABILITATION | Age: 62
End: 2022-05-23
Payer: COMMERCIAL

## 2022-05-23 DIAGNOSIS — M75.82 ROTATOR CUFF TENDONITIS, LEFT: Primary | ICD-10-CM

## 2022-05-23 PROCEDURE — 97110 THERAPEUTIC EXERCISES: CPT

## 2022-05-23 PROCEDURE — 97112 NEUROMUSCULAR REEDUCATION: CPT

## 2022-05-23 NOTE — PROGRESS NOTES
Daily Note     Today's date: 2022  Patient name: Deana Magallon  : 1960  MRN: [id number]  Referring provider: Janes Sky MD  Dx:   Encounter Diagnosis     ICD-10-CM    1  Rotator cuff tendonitis, left  M75 82        Start Time: 1615  Stop Time: 1655  Total time in clinic (min): 40 minutes    Subjective: Patient reports shoulder is doing well  Objective: See treatment diary below      Assessment: Patient had good tolerance for all activities today with mild discomfort  Patient demonstrated near full AROM with slight discomfort in the left shoulder  Tolerated OH press movements with slight discomfort and UT compensation  Patient would benefit from continued PT      Plan: Continue per plan of care        Precautions: DM      Manuals    PROM nv AFB AB TN TN TN TN TN TN    GHJ mobs (post/infer) nv nv   TN post and inf    TN Gr3-4 post and inf    GHJ Distraction nv nv           assessment   5'          Median Nerve Tensioners        2rds 30x 2rds 30x    STM pec mi/teres mj   Static and dynamic Static and dynamic STM post cuff STM bicep/post cuff STM bicep/post cuff stm post cuff stm post    Neuro Re-Ed  52 5/   ER/IR Isometrics nv nv           Crossover Symmetry Rows    Grn 2x10 grn 3x10 grn 3x10 prp 3x10 prp 3x10 prp 3x10 prp 3x10   Crossover Symmetry Exts     grn 3x10 grn 3x10 prp 3x10 prp 3x10 prp 3x10 prp 3x10   OH press          Dowel x20   OH press w/ DB          x20 1#                             Ther Ex  5/2 5/5 5   Table Slides Flexion HEP 5"x10           Wall Slides w/ Towel HEP            Supine Flexion AAROM w/ Cane HEP 5"x10 10x  2 sets 2x10 flex/ER  10"x10 abduction 10"x10 abduction/flexion 10"x10 abduction/flexion 10"x10 abduction/flexion 10"x10 abduction/flexion   Sidelying ER nv 1x10   2x10 1# 3x10 1# 3x10 1# 3x10 1# 3x10 1# 3x10 2#   Sidelying Flexion nv nv ranger nv 1x10           Wall slides flexion   2x10 2x10 3x10 3x10 3x10 3x10 3x10 3x10   Pullies nv 5 min  5' 5' 5' 6' 6' 6' 6'   Ther Activity 4/21 4/25                                     Gait Training                                       Modalities

## 2022-05-26 ENCOUNTER — OFFICE VISIT (OUTPATIENT)
Dept: PHYSICAL THERAPY | Facility: REHABILITATION | Age: 62
End: 2022-05-26
Payer: COMMERCIAL

## 2022-05-26 DIAGNOSIS — M75.82 ROTATOR CUFF TENDONITIS, LEFT: Primary | ICD-10-CM

## 2022-05-26 PROCEDURE — 97112 NEUROMUSCULAR REEDUCATION: CPT

## 2022-05-26 PROCEDURE — 97110 THERAPEUTIC EXERCISES: CPT

## 2022-05-26 NOTE — PROGRESS NOTES
Daily Note     Today's date: 2022  Patient name: Brianna Monk  : 1960  MRN: [id number]  Referring provider: Miguel Mcghee MD  Dx:   Encounter Diagnosis     ICD-10-CM    1  Rotator cuff tendonitis, left  M75 82                   Subjective: Pt reports her L shldr has been feeling better  Objective: See treatment diary below      Assessment: Tolerated treatment well  Continued with program as outlined below  Is challenged with current program, especially OH interventions  Progressed with addition of TB resisted ER walkout today  Patient demonstrated fatigue post treatment and would benefit from continued PT to further improve strength, decrease pain, and maximize overall function  Plan: Continue per plan of care        Precautions: DM      Manuals    PROM   AB OH OH OH OH OH OH    GHJ mobs (post/infer)     OH post and inf    OH Gr3-4 post and inf    GHJ Distraction             assessment   5'          Median Nerve Tensioners        2rds 30x 2rds 30x    STM pec mi/teres mj   Static and dynamic Static and dynamic STM post cuff STM bicep/post cuff STM bicep/post cuff stm post cuff stm post    Neuro Re-Ed    ER/IR Isometrics ER walkout   Yellow  2x10            Crossover Symmetry Rows prp 3x10   Grn 2x10 grn 3x10 grn 3x10 prp 3x10 prp 3x10 prp 3x10 prp 3x10   Crossover Symmetry Exts prp 3x10    grn 3x10 grn 3x10 prp 3x10 prp 3x10 prp 3x10 prp 3x10   OH press Dowel x20         Dowel x20   OH press w/ DB x20 1#         x20 1#                             Ther Ex    Table Slides Flexion             Wall Slides w/ Towel             Supine Flexion AAROM w/ Cane 10"x10 abduction/flexion  10x  2 sets 2x10 flex/ER  10"x10 abduction 10"x10 abduction/flexion 10"x10 abduction/flexion 10"x10 abduction/flexion 10"x10 abduction/flexion   Sidelying ER 3x10 2#    2x10 1# 3x10 1# 3x10 1# 3x10 1# 3x10 1# 3x10 2#   Sidelying Flexion             ranger             Wall slides flexion 3x10  2x10 2x10 3x10 3x10 3x10 3x10 3x10 3x10   Pullies 6'   5' 5' 5' 6' 6' 6' 6'   Ther Activity                                       Gait Training                                       Modalities

## 2022-05-31 ENCOUNTER — OFFICE VISIT (OUTPATIENT)
Dept: PHYSICAL THERAPY | Facility: REHABILITATION | Age: 62
End: 2022-05-31
Payer: COMMERCIAL

## 2022-05-31 DIAGNOSIS — M75.82 ROTATOR CUFF TENDONITIS, LEFT: Primary | ICD-10-CM

## 2022-05-31 PROCEDURE — 97112 NEUROMUSCULAR REEDUCATION: CPT

## 2022-05-31 PROCEDURE — 97110 THERAPEUTIC EXERCISES: CPT

## 2022-05-31 PROCEDURE — 97140 MANUAL THERAPY 1/> REGIONS: CPT

## 2022-05-31 NOTE — PROGRESS NOTES
Daily Note     Today's date: 2022  Patient name: Rossana Harper  : 1960  MRN: [id number]  Referring provider: Colt Lees MD  Dx:   Encounter Diagnosis     ICD-10-CM    1  Rotator cuff tendonitis, left  M75 82                   Subjective: Pt reports she continues to see improvement with symptoms of L shldr and improved ability to perform ADL's  Objective: See treatment diary below      Assessment: Tolerated treatment well  Continued with program as outlined below  Slight discomfort in L ant shldr when climbing onto high mat; did present with slight soft tissue restriction along L ant shldr which began to resolve with manual STM  Progressed with addition of TB resistance during wall slide, in effort to further improve scapular stability in OH positions  Patient demonstrated fatigue post treatment and would benefit from continued PT to further improve strength, decrease pain, and maximize overall function  Plan: Continue per plan of care        Precautions: DM      Manuals    PROM    MT MT MT MT MT MT    GHJ mobs (post/infer)     MT post and inf    MT Gr3-4 post and inf    GHJ Distraction             assessment             Median Nerve Tensioners        2rds 30x 2rds 30x    STM pec mi/teres mj  STM bicep/pec  Static and dynamic STM post cuff STM bicep/post cuff STM bicep/post cuff stm post cuff stm post    Neuro Re-Ed    ER/IR Isometrics ER walkout   Yellow  2x10 ER walkout   green  1x10           Crossover Symmetry Rows prp 3x10 prp 3x10  Grn 2x10 grn 3x10 grn 3x10 prp 3x10 prp 3x10 prp 3x10 prp 3x10   Crossover Symmetry Exts prp 3x10 prp 3x10   grn 3x10 grn 3x10 prp 3x10 prp 3x10 prp 3x10 prp 3x10   OH press Dowel x20         Dowel x20   OH press w/ DB x20 1# x20   1#        x20 1#                             Ther Ex    Table Slides Flexion             Wall Slides w/ Towel             Supine Flexion AAROM w/ Cane 10"x10 abduction/flexion 10"[id number]/flexion  2x10 flex/ER  10"x10 abduction 10"x10 abduction/flexion 10"x10 abduction/flexion 10"x10 abduction/flexion 10"x10 abduction/flexion   Sidelying ER 3x10 2# 3x10 2#   2x10 1# 3x10 1# 3x10 1# 3x10 1# 3x10 1# 3x10 2#   Sidelying Flexion             ranger             Wall slides flexion 3x10 YTB  2x10  2x10 3x10 3x10 3x10 3x10 3x10 3x10   Pullies 6' 6'  5' 5' 5' 6' 6' 6' 6'   Ther Activity                                       Gait Training                                       Modalities

## 2022-06-02 ENCOUNTER — OFFICE VISIT (OUTPATIENT)
Dept: PHYSICAL THERAPY | Facility: REHABILITATION | Age: 62
End: 2022-06-02
Payer: COMMERCIAL

## 2022-06-02 DIAGNOSIS — M75.82 ROTATOR CUFF TENDONITIS, LEFT: Primary | ICD-10-CM

## 2022-06-02 PROCEDURE — 97140 MANUAL THERAPY 1/> REGIONS: CPT

## 2022-06-02 PROCEDURE — 97110 THERAPEUTIC EXERCISES: CPT

## 2022-06-02 PROCEDURE — 97112 NEUROMUSCULAR REEDUCATION: CPT

## 2022-06-02 NOTE — PROGRESS NOTES
Daily Note     Today's date: 2022  Patient name: Sarah Glaser  : 1960  MRN: [id number]  Referring provider: Kyler Lama MD  Dx:   Encounter Diagnosis     ICD-10-CM    1  Rotator cuff tendonitis, left  M75 82        Start Time: 1615  Stop Time: 1659  Total time in clinic (min): 44 minutes    Subjective: Patient reports some soreness in shoulder at work today  Objective: See treatment diary below      Assessment: Good tolerance for all activities today  Slight pain in shoulder with OH press exercise  Patient still has end-range pain with passive flexion and abduction  Improved with STM to deltoid  Patient would benefit from continued PT      Plan: Continue per plan of care        Precautions: DM      Manuals    PROM         HI    GHJ mobs (post/infer)         [place] Gr3-4 post and inf    GHJ Distraction             assessment             Median Nerve Tensioners         2rds 30x    STM pec mi/teres mj  STM bicep/pec STM delt      stm post    Neuro Re-Ed    ER/IR Isometrics ER walkout   Yellow  2x10 ER walkout   green  1x10 np resume          Crossover Symmetry Rows prp 3x10 prp 3x10 prp 3x10      prp 3x10 prp 3x10   Crossover Symmetry Exts prp 3x10 prp 3x10 prp 3x10      prp 3x10 prp 3x10   OH press Dowel x20         Dowel x20   OH press w/ DB x20 1# x20   1# x20   2#       x20 1#                             Ther Ex    Table Slides Flexion             Wall Slides w/ Towel             Supine Flexion AAROM w/ Cane 10"x10 abduction/flexion 10"[id number]/flexion 10"x96zvpvohkwy/flexion      10"x10 abduction/flexion 10"x10 abduction/flexion   Sidelying ER 3x10 2# 3x10 2# 3x10 2#      3x10 1# 3x10 2#   Sidelying Flexion             ranger             Wall slides flexion 3x10 YTB  2x10 ytb 2x10      3x10 3x10   Pullies 6' 6' 6'      6' 6'   Ther Activity                                       Gait Training Modalities

## 2022-06-09 ENCOUNTER — OFFICE VISIT (OUTPATIENT)
Dept: PHYSICAL THERAPY | Facility: REHABILITATION | Age: 62
End: 2022-06-09
Payer: COMMERCIAL

## 2022-06-09 DIAGNOSIS — M75.82 ROTATOR CUFF TENDONITIS, LEFT: Primary | ICD-10-CM

## 2022-06-09 PROCEDURE — 97110 THERAPEUTIC EXERCISES: CPT

## 2022-06-09 PROCEDURE — 97140 MANUAL THERAPY 1/> REGIONS: CPT

## 2022-06-09 NOTE — PROGRESS NOTES
PT Re-Evaluation     Today's date: 2022  Patient name: Julissa Patel  : 1960  MRN: [id number]  Referring provider: Rosaura Sanders MD  Dx:   Encounter Diagnosis     ICD-10-CM    1  Rotator cuff tendonitis, left  M75 82        Start Time:   Stop Time:   Total time in clinic (min): 45 minutes    Assessment  Assessment details: Julissa Patel is a pleasant 57 y o  female who presents with acute on chronic left shoulder pain  Amrita Diaz has actively participated in PT for 14 visits from 22-22  Amrita Diaz has demonstrated objective improvements in her left shoulder pain and function  Patient GROC 75% at this time  Most recent FOTO score was 46/70 indictating she has not met expected goal yet  Patient continues to have pain in the shoulder limiting her at work and with light and heavy activities around the house  Patient would continue to benefit from skilled physical therapy at this time to facilitate a return to PLOF  Impairments: abnormal muscle firing, abnormal or restricted ROM, activity intolerance, impaired physical strength, lacks appropriate home exercise program and pain with function    Symptom irritability: moderateUnderstanding of Dx/Px/POC: good   Prognosis: good    Goals  Short Term Goals (Week 4): in progress  1  Decreased pain by 50%  2  Demonstrate full AROM with <4/10  3  Improve strength by 1/2 measure in all deficient areas      Long Term Goals (8 weeks):  1  <2/10 pain with OHAs  2  Exceed FOTO predicted outcome score  3  Fully independent with HEP by discharge  4  Patient will be able to manage symptoms independently       Plan  Patient would benefit from: skilled physical therapy  Planned modality interventions: low level laser therapy and electrical stimulation/Russian stimulation  Planned therapy interventions: joint mobilization, manual therapy, massage, activity modification, behavior modification, neuromuscular re-education, patient education, home exercise program, graded activity, functional ROM exercises, flexibility, therapeutic activities, therapeutic exercise, stretching and strengthening  Frequency: 2x week  Duration in visits: 12  Duration in weeks: 6  Treatment plan discussed with: patient        Subjective Evaluation    History of Present Illness  Mechanism of injury: Patient reports left shoulder pain that was recently aggravated in the last month  Patient is localized to left shoulder and down into forearm  Patient reports shoulder pain has been going on for over 6 months  Patient denies any previous injuries/traumas/falls on the left shoulder  Patient denies any numbness/tingling  Pain is aggravated with reaching behind back and overhead activities  Patient works in a [employer]  Patient reports she cannot sleep on her left shoulder due to pain but not pain at night  Patient is RHD  Re-Evaluation 22  Patient reports 75% improvement at this time  Patient reports her shoulder does not limit her very much at home  Patient notes her biggest difficulties are pain at work and pain with reaching back to grab the seatbelt     Pain  Current pain ratin  At worst pain ratin  Quality: throbbing  Relieving factors: medications and rest (ibuprofen improves pain )    Patient Goals  Patient goals for therapy: decreased pain, increased motion, independence with ADLs/IADLs, increased strength and return to work  Patient goal: Be able to reach [place] without limitations and reach behind the back without limitations        Objective     Active Range of Motion   Left Shoulder   Flexion: 140 degrees   Abduction: 120 degrees   External rotation BTH: C5 with pain  Internal rotation BTB: lumbar with pain    Right Shoulder   Normal active range of motion  External rotation BTH: T1   Internal rotation BTB: mid thoracic     Passive Range of Motion   Left Shoulder   Flexion: 140 degrees with pain  Abduction: 120 degrees with pain  External rotation 90°: 45 degrees with pain  Internal rotation 90°: 50 degrees     Right Shoulder   Flexion: 160 degrees   Abduction: 100 degrees   External rotation 90°: 90 degrees   Internal rotation 90°: 50 degrees     Strength/Myotome Testing     Left Shoulder     Planes of Motion   Flexion: 5   External rotation at 0°: 5   Internal rotation at 0°: 5     Isolated Muscles   Biceps: 5   Triceps: 4+     Right Shoulder     Planes of Motion   Flexion: 5   External rotation at 0°: 5   Internal rotation at 0°: 5     Isolated Muscles   Biceps: 5   Triceps: 5     Tests     Left Shoulder   Positive Hawkin's and Madisyn/Acevedo  Negative crossover, drop arm and painful arc                Precautions: DM    Manuals 5/26 5/31 6/2 6/9 5/19 5/23   PROM         [place]    GHJ mobs (post/infer)         [place] Gr3-4 post and inf    GHJ Distraction             Re-Evaluation    perf         Median Nerve Tensioners         2rds 30x    STM pec mi/teres mj  STM bicep/pec STM delt STM post cuff     stm post    Neuro Re-Ed 5/26 5/31 6/2 6/9 5/19 5/23   ER/IR Isometrics ER walkout   Yellow  2x10 ER walkout   green  1x10 np resume np resume         Crossover Symmetry Rows prp 3x10 prp 3x10 prp 3x10 np resume     prp 3x10 prp 3x10   Crossover Symmetry Exts prp 3x10 prp 3x10 prp 3x10 np resume     prp 3x10 prp 3x10   OH press Dowel x20         Dowel x20   OH press w/ DB x20 1# x20   1# x20   2# x20 3#      x20 1#                             Ther Ex 5/26 5/31 6/2 6/9 5/19 5/23   Table Slides Flexion             Wall Slides w/ Towel             Supine Flexion AAROM w/ Cane 10"x10 abduction/flexion 10"f99ouvxoyzpe/flexion 10"o40qfdanzstw/flexion 10"o64cjhmqngum/flexion     10"x10 abduction/flexion 10"x10 abduction/flexion   Sidelying ER 3x10 2# 3x10 2# 3x10 3# 3x10 3#     3x10 1# 3x10 2#   Sidelying Flexion             ranger             Wall slides flexion 3x10 YTB  2x10 ytb 2x10 Yb 2x10     3x10 3x10   Pullies 6' 6' 6' 6'      6' 6'   Ther Activity                                       Gait Training Modalities

## 2022-06-14 ENCOUNTER — OFFICE VISIT (OUTPATIENT)
Dept: PHYSICAL THERAPY | Facility: REHABILITATION | Age: 62
End: 2022-06-14
Payer: COMMERCIAL

## 2022-06-14 DIAGNOSIS — M75.82 ROTATOR CUFF TENDONITIS, LEFT: Primary | ICD-10-CM

## 2022-06-14 PROCEDURE — 97110 THERAPEUTIC EXERCISES: CPT

## 2022-06-14 PROCEDURE — 97112 NEUROMUSCULAR REEDUCATION: CPT

## 2022-06-14 NOTE — PROGRESS NOTES
Cortisone Injection    You have received a cortisone injection. The medication is a combination of a short acting anesthetic (numbing medication)  plus a steroid.     You may see some relief from the pain for several hours following the injection due to the numbing medication. Later that day or the next day you may have the same pain you had before or an increase in soreness. Swelling can also occur.  You may try a cold compress for 20 minutes on and 20 minutes off that day or over the counter medications with food (if not allergic)    Our expectation would be that you will improve on a day by day basis for the next several weeks or even longer.     Cortisone may cause a temporary (usually 2-3 days) increase in blood glucose (sugar) levels. If you have diabetes, please pay particular attention to this.     Please call the office if there are no signs of improvement after 4 weeks or if other problems develop such as an increase in swelling or redness. Our office number is [phone]    Thank you for allowing us to be of service to you.     [facility].        Daily Note     Today's date: 2022  Patient name: Christen Camacho  : 1960  MRN: [id number]  Referring provider: Jose Roberto Sky MD  Dx:   Encounter Diagnosis     ICD-10-CM    1  Rotator cuff tendonitis, left  M75 82        Start Time: 1615  Stop Time: 1657  Total time in clinic (min): 42 minutes    Subjective: Patient reports her shoulder is feeling a bit better      Objective: See treatment diary below      Assessment: Patient had good tolerance for all activities today  Not increased symptoms with progressions and new activities  Continue to progress all activities slowly  Patient would benefit from continued PT      Plan: Continue per plan of care        Precautions: DM    Manuals    PROM         NC    GHJ mobs (post/infer)         NC Gr3-4 post and inf    GHJ Distraction             Re-Evaluation    perf         Median Nerve Tensioners         2rds 30x    STM pec mi/teres mj  STM bicep/pec STM delt STM post cuff     stm post    Neuro Re-Ed    ER/IR Isometrics ER walkout   Yellow  2x10 ER walkout   green  1x10 np resume np resume         Crossover Symmetry Rows prp 3x10 prp 3x10 prp 3x10 np resume prp 3x15    prp 3x10 prp 3x10   Crossover Symmetry Exts prp 3x10 prp 3x10 prp 3x10 np resume prp 3x15    prp 3x10 prp 3x10   OH press Dowel x20         Dowel x20   OH press w/ DB x20 1# x20   1# x20   2# x20 3# 2x20   2#     x20 1#                             Ther Ex    [place] Scaption     5" hold x20                     Supine Flexion AAROM w/ Cane 10"x10 abduction/flexion 10"g19vyzlszuuc/flexion 10"d01iwaumqlkl/flexion 10"s94osxhuhuqr/flexion 10"q77kgvmwnfzv/flexion    10"x10 abduction/flexion 10"x10 abduction/flexion   Sidelying ER 3x10 2# 3x10 2# 3x10 3# 3x10 3# 3x10 3#    3x10 1# 3x10 2#   Sidelying Flexion             ranger             Wall slides flexion 3x10 YTB  2x10 ytb 2x10 Yb 2x10 Yb 2x10    3x10 3x10   Pullies 6' 6' 6' 6'  6'    6' 6'   Ther Activity                                       Gait Training                                       Modalities

## 2022-06-16 ENCOUNTER — APPOINTMENT (OUTPATIENT)
Dept: PHYSICAL THERAPY | Facility: REHABILITATION | Age: 62
End: 2022-06-16
Payer: COMMERCIAL

## 2022-06-23 ENCOUNTER — OFFICE VISIT (OUTPATIENT)
Dept: PHYSICAL THERAPY | Facility: REHABILITATION | Age: 62
End: 2022-06-23
Payer: COMMERCIAL

## 2022-06-23 DIAGNOSIS — M75.82 ROTATOR CUFF TENDONITIS, LEFT: Primary | ICD-10-CM

## 2022-06-23 PROCEDURE — 97112 NEUROMUSCULAR REEDUCATION: CPT

## 2022-06-23 PROCEDURE — 97110 THERAPEUTIC EXERCISES: CPT

## 2022-06-23 NOTE — PROGRESS NOTES
Daily Note     Today's date: 2022  Patient name: Kirill Peralta  : 1960  MRN: [id number]  Referring provider: Wally Crigler, MD  Dx:   Encounter Diagnosis     ICD-10-CM    1  Rotator cuff tendonitis, left  M75 82        Start Time: 1615  Stop Time: 1655  Total time in clinic (min): 40 minutes    Subjective: Patient reports her shoulder is still not where it needs to be, but it is improving with PT      Objective: See treatment diary below      Assessment: Patient making steady improvements in shoulder AROM and PROM  Patient has mild soreness throughout session but nothing lasting longer than 1 day  Patient would benefit from continued PT      Plan: Continue per plan of care        Precautions: DM    Manuals    PROM         NH    GHJ mobs (post/infer)         NH Gr3-4 post and inf    GHJ Distraction             Re-Evaluation    perf         Median Nerve Tensioners         2rds 30x    STM pec mi/teres mj  STM bicep/pec STM delt STM post cuff     stm post    Neuro Re-Ed    ER/IR Isometrics ER walkout   Yellow  2x10 ER walkout   green  1x10 np resume np resume         Crossover Symmetry Rows prp 3x10 prp 3x10 prp 3x10 np resume prp 3x15 prp 3x15   prp 3x10 prp 3x10   Crossover Symmetry Exts prp 3x10 prp 3x10 prp 3x10 np resume prp 3x15 prp 3x15   prp 3x10 prp 3x10   OH press Dowel x20         Dowel x20   OH press w/ DB x20 1# x20   1# x20   2# x20 3# 2x20   2# 2x20 2#    x20 1#                             Ther Ex   6   [place] Scaption     5" hold x20 10"x10 scap and abd                    Supine Flexion AAROM w/ Cane 10"x10 abduction/flexion 10"s84otjiejsjj/flexion 10"n79avrolmkby/flexion 10"i66vmotuzdqr/flexion 10"w14rokctiwwy/flexion 10"u47naajcqrrk/flexion   10"x10 abduction/flexion 10"x10 abduction/flexion   Sidelying ER 3x10 2# 3x10 2# 3x10 3# 3x10 3# 3x10 3# 3x10 3#   3x10 1# 3x10 2# Sidelying Flexion             ranger             Wall slides flexion 3x10 YTB  2x10 ytb 2x10 Yb 2x10 Yb 2x10 Yb 3x10   3x10 3x10   Pullies 6' 6' 6' 6'  6' 6'   6' 6'   Ther Activity                                       Gait Training                                       Modalities

## 2022-06-28 ENCOUNTER — OFFICE VISIT (OUTPATIENT)
Dept: PHYSICAL THERAPY | Facility: REHABILITATION | Age: 62
End: 2022-06-28
Payer: COMMERCIAL

## 2022-06-28 DIAGNOSIS — M75.82 ROTATOR CUFF TENDONITIS, LEFT: Primary | ICD-10-CM

## 2022-06-28 PROCEDURE — 97112 NEUROMUSCULAR REEDUCATION: CPT

## 2022-06-28 PROCEDURE — 97110 THERAPEUTIC EXERCISES: CPT

## 2022-06-28 NOTE — PROGRESS NOTES
Daily Note     Today's date: 2022  Patient name: Sarah Glaser  : 1960  MRN: [id number]  Referring provider: Kyler Lama MD  Dx:   Encounter Diagnosis     ICD-10-CM    1  Rotator cuff tendonitis, left  M75 82        Start Time: 1600  Stop Time: 1640  Total time in clinic (min): 40 minutes    Subjective: Patient reports her shoulder was feeling good over the weekend  Objective: See treatment diary below      Assessment: Good tolerance for activities today  Moderate fatigue end of session  Patient still demonstrates limitation in PROM with abduction and ER but has significantly less pain overall with both movements  Patient would benefit from continued PT      Plan: Continue per plan of care        Precautions: DM    Manuals    PROM         NV    GHJ mobs (post/infer)         NV Gr3-4 post and inf    GHJ Distraction             Re-Evaluation    perf         Median Nerve Tensioners         2rds 30x    STM pec mi/teres mj  STM bicep/pec STM delt STM post cuff     stm post    Neuro Re-Ed  6   ER/IR Isometrics ER walkout   Yellow  2x10 ER walkout   green  1x10 np resume np resume         Crossover Symmetry Rows prp 3x10 prp 3x10 prp 3x10 np resume prp 3x15 prp 3x15 prp 3x15  prp 3x10 prp 3x10   Crossover Symmetry Exts prp 3x10 prp 3x10 prp 3x10 np resume prp 3x15 prp 3x15 prp 3x15  prp 3x10 prp 3x10   OH press Dowel x20         Dowel x20   OH press w/ DB x20 1# x20   1# x20   2# x20 3# 2x20   2# 2x20 2# 2x20 2#   x20 1#                             Ther Ex   6/  6/   [place] Scaption     5" hold x20 10"x10 scap and abd 10"x10 scap and abd                   Supine Flexion AAROM w/ Cane 10"x10 abduction/flexion 10"s98tzpdehhiy/flexion 10"f86uxqbpzhsn/flexion 10"v22kahffevib/flexion 10"r72ewjbtkahg/flexion 10"y89uhinncyel/flexion 10"o15ialwzabwm/flexion  10"x10 abduction/flexion 10"x10 abduction/flexion   Sidelying ER 3x10 2# 3x10 2# 3x10 3# 3x10 3# 3x10 3# 3x10 3# 3x10 3#  3x10 1# 3x10 2#   Sidelying Flexion             ranger             Wall slides flexion 3x10 YTB  2x10 ytb 2x10 Yb 2x10 Yb 2x10 Yb 3x10 Yb 3x10  3x10 3x10   Pullies 6' 6' 6' 6'  6' 6' 6'  6' 6'   Ther Activity                                       Gait Training                                       Modalities

## 2022-06-30 ENCOUNTER — APPOINTMENT (OUTPATIENT)
Dept: PHYSICAL THERAPY | Facility: REHABILITATION | Age: 62
End: 2022-06-30
Payer: COMMERCIAL

## 2022-07-05 ENCOUNTER — OFFICE VISIT (OUTPATIENT)
Dept: PHYSICAL THERAPY | Facility: REHABILITATION | Age: 62
End: 2022-07-05
Payer: COMMERCIAL

## 2022-07-05 DIAGNOSIS — M75.82 ROTATOR CUFF TENDONITIS, LEFT: Primary | ICD-10-CM

## 2022-07-05 PROCEDURE — 97112 NEUROMUSCULAR REEDUCATION: CPT

## 2022-07-05 PROCEDURE — 97110 THERAPEUTIC EXERCISES: CPT

## 2022-07-05 NOTE — PROGRESS NOTES
Daily Note     Today's date: 2022  Patient name: Naya Espinosa  : 1960  MRN: [id number]  Referring provider: Alyssa Rosas MD  Dx:   Encounter Diagnosis     ICD-10-CM    1  Rotator cuff tendonitis, left  M75 82                   Subjective: Pt reports her L shldr was bothering her a little bit yesterday, but felt better after utilizing hot pack at home  Notes her neck was also bothering her a little bit this morning; thinks she maybe slept wrong on her L side, causing this increase in symptoms  Objective: See treatment diary below      Assessment: Tolerated treatment well  Continued with program as outlined below  Some discomfort with wall slides, as well as OH ranger in both scaption and abduction today  Most challenged with OH DB press, exhibiting visible signs of fatigue after completing this exercise  Patient would benefit from continued PT  Plan: Continue per plan of care        Precautions: DM    Manuals  6 6   PROM             GHJ mobs (post/infer)             GHJ Distraction             Re-Evaluation    perf         Median Nerve Tensioners             STM pec mi/teres mj  STM bicep/pec STM delt STM post cuff         Neuro Re-Ed  6/   ER/IR Isometrics ER walkout   Yellow  2x10 ER walkout   green  1x10 np resume np resume         Crossover Symmetry Rows prp 3x10 prp 3x10 prp 3x10 np resume prp 3x15 prp 3x15 prp 3x15 prp 3x15  prp 3x10   Crossover Symmetry Exts prp 3x10 prp 3x10 prp 3x10 np resume prp 3x15 prp 3x15 prp 3x15 prp 3x15  prp 3x10   OH press Dowel x20         Dowel x20   OH press w/ DB x20 1# x20   1# x20   2# x20 3# 2x20   2# 2x20 2# 2x20 2# 2x20 2#  x20 1#                             Ther Ex   6/2  6/9     [place] Scaption     5" hold x20 10"x10 scap and abd 10"x10 scap and abd 10"x10 scap and abd                  Supine Flexion AAROM w/ Cane 10"x10 abduction/flexion 10"n00skcidwgjm/flexion 10"r77wywotznez/flexion 10"r10ihkkseuiy/flexion 10"x22lqgxoamua/flexion 10"l20rwdglhnag/flexion 10"t65xnsyqxvqp/flexion 10"e50mhkuaprrb/flexion  10"x10 abduction/flexion   Sidelying ER 3x10 2# 3x10 2# 3x10 3# 3x10 3# 3x10 3# 3x10 3# 3x10 3# 3x10 3#  3x10 2#   Sidelying Flexion             ranger             Wall slides flexion 3x10 YTB  2x10 ytb 2x10 Yb 2x10 Yb 2x10 Yb 3x10 Yb 3x10 Yb 3x10  3x10   Pullies 6' 6' 6' 6'  6' 6' 6' 6'  6'   Ther Activity                                       Gait Training                                       Modalities

## 2022-07-07 ENCOUNTER — APPOINTMENT (OUTPATIENT)
Dept: PHYSICAL THERAPY | Facility: REHABILITATION | Age: 62
End: 2022-07-07
Payer: COMMERCIAL

## 2022-07-08 ENCOUNTER — OFFICE VISIT (OUTPATIENT)
Dept: PHYSICAL THERAPY | Facility: REHABILITATION | Age: 62
End: 2022-07-08
Payer: COMMERCIAL

## 2022-07-08 DIAGNOSIS — M75.82 ROTATOR CUFF TENDONITIS, LEFT: Primary | ICD-10-CM

## 2022-07-08 PROCEDURE — 97110 THERAPEUTIC EXERCISES: CPT

## 2022-07-08 PROCEDURE — 97112 NEUROMUSCULAR REEDUCATION: CPT

## 2022-07-08 NOTE — PROGRESS NOTES
Daily Note     Today's date: 2022  Patient name: Manuela Tran  : 1960  MRN: [id number]  Referring provider: Monserrat Marino MD  Dx:   Encounter Diagnosis     ICD-10-CM    1  Rotator cuff tendonitis, left  M75 82        Start Time: 815  Stop Time:   Total time in clinic (min): 40 minutes    Subjective: Patient reports her shoulder continues to improve with each week  Objective: See treatment diary below      Assessment: Tolerated progressions well today  Patient having less pain with PROM and stretching  Patient is slowly restoring her shoulder flexion AROM  Patient would benefit from continued PT      Plan: Continue per plan of care        Precautions: DM     Manuals  7/8    PROM             GHJ mobs (post/infer)             GHJ Distraction             Re-Evaluation    perf         Median Nerve Tensioners             STM pec mi/teres mj  STM bicep/pec STM delt STM post cuff         Neuro Re-Ed  7/8    ER/IR Isometrics ER walkout   Yellow  2x10 ER walkout   green  1x10 np resume np resume         Crossover Symmetry Rows prp 3x10 prp 3x10 prp 3x10 np resume prp 3x15 prp 3x15 prp 3x15 prp 3x15 ylw 2x15    Crossover Symmetry Exts prp 3x10 prp 3x10 prp 3x10 np resume prp 3x15 prp 3x15 prp 3x15 prp 3x15 ylw 2x15    OH press Dowel x20            OH press w/ DB x20 1# x20   1# x20   2# x20 3# 2x20   2# 2x20 2# 2x20 2# 2x20 2# 2x20 3#                              Ther Ex   6  6  7/8    [place] Scaption     5" hold x20 scaption 10"x10 scap and abd 10"x10 scap and abd 10"x10 scap and abd 5" hold x20 scaption/flexion                 Supine Flexion AAROM w/ Cane 10"x10 abduction/flexion 10"r86dkswurcep/flexion 10"u86otcvcaawo/flexion 10"r64kbdcbhlma/flexion 10"u23lsmvondrs/flexion 10"l50rnoeudhjk/flexion 10"b61ugsusqlnh/flexion 10"x77zzxkhaqmk/flexion 10"i24ufierbefw/flexion    Sidelying ER 3x10 2# 3x10 2# 3x10 3# 3x10 3# 3x10 3# 3x10 3# 3x10 3# 3x10 3# 3x10 4#    Sidelying Flexion             ranger             Wall slides flexion 3x10 YTB  2x10 ytb 2x10 Yb 2x10 Yb 2x10 Yb 3x10 Yb 3x10 Yb 3x10 grn 3x10    Pullies 6' 6' 6' 6'  6' 6' 6' 6' 6'    Ther Activity                                       Gait Training                                       Modalities

## 2022-07-11 ENCOUNTER — OFFICE VISIT (OUTPATIENT)
Dept: PHYSICAL THERAPY | Facility: REHABILITATION | Age: 62
End: 2022-07-11
Payer: COMMERCIAL

## 2022-07-11 DIAGNOSIS — M75.82 ROTATOR CUFF TENDONITIS, LEFT: Primary | ICD-10-CM

## 2022-07-11 PROCEDURE — 97112 NEUROMUSCULAR REEDUCATION: CPT

## 2022-07-11 PROCEDURE — 97110 THERAPEUTIC EXERCISES: CPT

## 2022-07-11 NOTE — PROGRESS NOTES
Daily Note     Today's date: 2022  Patient name: Joselyn Nicole  : 1960  MRN: [id number]  Referring provider: Ariane Cloud MD  Dx:   Encounter Diagnosis     ICD-10-CM    1  Rotator cuff tendonitis, left  M75 82        Start Time: 1100  Stop Time: 1140  Total time in clinic (min): 40 minutes    Subjective: Patient reports her shoulder was feeling good over the weekend  Patient reports heat packs on her shoulder help alleviate symptoms      Objective: See treatment diary below      Assessment: Patient functionally is demonstrating good improvements with AROM flexion and scaption  Patient is still limited with pure abduction AROM  Patient tolerating progressions well  Continue to focus on restoring AROM and strength  Patient would benefit from continued PT      Plan: Continue per plan of care        Precautions: DM     Manuals    PROM             GHJ mobs (post/infer)             GHJ Distraction             Re-Evaluation    perf         Median Nerve Tensioners             STM pec mi/teres mj  STM bicep/pec STM delt STM post cuff         Neuro Re-Ed    ER/IR Isometrics ER walkout   Yellow  2x10 ER walkout   green  1x10 np resume np resume         Crossover Symmetry Rows prp 3x10 prp 3x10 prp 3x10 np resume prp 3x15 prp 3x15 prp 3x15 prp 3x15 ylw 2x15 ylw 2x15   Crossover Symmetry Exts prp 3x10 prp 3x10 prp 3x10 np resume prp 3x15 prp 3x15 prp 3x15 prp 3x15 ylw 2x15 ylw 2x15   OH press Dowel x20            OH press w/ DB x20 1# x20   1# x20   2# x20 3# 2x20   2# 2x20 2# 2x20 2# 2x20 2# 2x20 3# 2x20 3#                             Ther Ex   6  7   [place] Scaption     5" hold x20 scaption 10"x10 scap and abd 10"x10 scap and abd 10"x10 scap and abd 5" hold x20 scaption/flexion 10" hold x20 scaption/flexion/abduction                Supine Flexion AAROM w/ Cane 10"x10 abduction/flexion 10"r48gratdlnxj/flexion 10"m29vdumhwyvo/flexion 10"d76uvuumfxot/flexion 10"y19uoooojkyw/flexion 10"r36yxecklqbh/flexion 10"w97tmvelzeiz/flexion 10"g27rltnzsqwp/flexion 10"e65wfqrdvbmi/flexion 10"x15 abduction/flexion   Sidelying ER 3x10 2# 3x10 2# 3x10 3# 3x10 3# 3x10 3# 3x10 3# 3x10 3# 3x10 3# 3x10 4# 3x10 4#   Sidelying Flexion             ranger             Wall slides flexion 3x10 YTB  2x10 ytb 2x10 Yb 2x10 Yb 2x10 Yb 3x10 Yb 3x10 Yb 3x10 grn 3x10 grn 3x10   Pullies 6' 6' 6' 6'  6' 6' 6' 6' 6' 7'   Ther Activity                                       Gait Training                                       Modalities

## 2022-07-14 ENCOUNTER — OFFICE VISIT (OUTPATIENT)
Dept: PHYSICAL THERAPY | Facility: REHABILITATION | Age: 62
End: 2022-07-14
Payer: COMMERCIAL

## 2022-07-14 DIAGNOSIS — M75.82 ROTATOR CUFF TENDONITIS, LEFT: Primary | ICD-10-CM

## 2022-07-14 PROCEDURE — 97110 THERAPEUTIC EXERCISES: CPT

## 2022-07-14 PROCEDURE — 97112 NEUROMUSCULAR REEDUCATION: CPT

## 2022-07-14 NOTE — PROGRESS NOTES
Daily Note     Today's date: 2022  Patient name: Dominik Solis  : 1960  MRN: [id number]  Referring provider: Titi Mcnulty MD  Dx:   Encounter Diagnosis     ICD-10-CM    1  Rotator cuff tendonitis, left  M75 82        Start Time: 1600  Stop Time: 1645  Total time in clinic (min): 45 minutes    Subjective: Patient reports her shoulder is a bit sore today  Objective: See treatment diary below    Pain Levels: not specified, mild pain     Assessment: Patient has made excellent progress in her shoulder function, strength, and ROM  Patient still having pain in her shoulder with work-related activities but patient has been able to greatly expand her ROM without increased pain  Patient has been able to resume sleeping on her left shoulder with minimal pain  Patient will continue to HEP at home at this time  Will f/u with patient in 1 month regarding symptoms and potentially discharge at that time  Patient would benefit from continued PT      Short Term Goals (Week 4): (partially met all)  1  Decreased pain by 50% (less overall pain with significantly more ROM)  2  Demonstrate full AROM with <4/10 (met)  3  Improve strength by 1/2 measure in all deficient areas (met)      Long Term Goals (8 weeks):  1  <2/10 pain with OHAs (met)  2  Exceed FOTO predicted outcome score (not met)  3  Fully independent with HEP by discharge (met)  4  Patient will be able to manage symptoms independently (met)      Plan: Continue per plan of care        Precautions: DM     Manuals    PROM             GHJ mobs (post/infer)             GHJ Distraction             Re-Evaluation    perf         Median Nerve Tensioners             STM pec mi/justin mj             Neuro Re-Ed        ER/IR Isometrics             Crossover Symmetry Rows ylw 2x15    prp 3x15 prp 3x15 prp 3x15 prp 3x15 ylw 2x15 ylw 2x15   Crossover Symmetry Exts ylw 2x15    prp 3x15 prp 3x15 prp 3x15 prp 3x15 ylw 2x15 ylw 2x15   OH press             OH press w/ DB 2x20 3#    2x20   2# 2x20 2# 2x20 2# 2x20 2# 2x20 3# 2x20 3#                             Ther Ex      6/14 6/23 6/28 7/5 7/8 7/11   [place] Scaption 10" hold x20 scaption/flexion/abduction    5" hold x20 scaption 10"x10 scap and abd 10"x10 scap and abd 10"x10 scap and abd 5" hold x20 scaption/flexion 10" hold x20 scaption/flexion/abduction                Supine Flexion AAROM w/ Cane 10"x15 abduction/flexion    10"h60snjyoxwzj/flexion 10"f64wdwnguddl/flexion 10"a74dwxfmtkyo/flexion 10"i14wszhrznad/flexion 10"x66apxymvqhs/flexion 10"x15 abduction/flexion   Sidelying ER 3x10 4#    3x10 3# 3x10 3# 3x10 3# 3x10 3# 3x10 4# 3x10 4#   Sidelying Flexion             ranger             Wall slides flexion grn 3x10    Yb 2x10 Yb 3x10 Yb 3x10 Yb 3x10 grn 3x10 grn 3x10   Pullies 7'    6' 6' 6' 6' 6' 7'   Ther Activity                                       Gait Training                                       Modalities

## 2022-07-28 ENCOUNTER — TELEMEDICINE (OUTPATIENT)
Dept: INTERNAL MEDICINE CLINIC | Facility: CLINIC | Age: 62
End: 2022-07-28
Payer: COMMERCIAL

## 2022-07-28 VITALS — BODY MASS INDEX: 38.83 KG/M2 | WEIGHT: 185 LBS | HEIGHT: 58 IN

## 2022-07-28 DIAGNOSIS — U07.1 COVID-19 VIRUS DETECTED: Primary | ICD-10-CM

## 2022-07-28 PROCEDURE — 99213 OFFICE O/P EST LOW 20 MIN: CPT | Performed by: INTERNAL MEDICINE

## 2022-07-28 NOTE — PROGRESS NOTES
Virtual Regular Visit    Verification of patient location:    Patient is located in the following state in which I hold an active license [place]      Assessment/Plan:    Problem List Items Addressed This Visit        Other    COVID-19 virus detected - Primary    Relevant Medications    nirmatrelvir & ritonavir (Paxlovid) tablet therapy pack               Reason for visit is   Chief Complaint   Patient presents with   Yudelka Sanchez [phone] Tested positive for COVID today, coughing, sore throat, runny nose, left ear pain,    hm     Labs done 4/4/22, pt plan of care    Virtual Regular Visit        Encounter provider Saurav Marques MD    Provider located at [address] [address] [phone]      Recent Visits  No visits were found meeting these conditions  Showing recent visits within past 7 days and meeting all other requirements  Today's Visits  Date Type Provider Dept   07/28/22 Telemedicine Saurav Marques MD [facility]  [address] today's visits and meeting all other requirements  Future Appointments  No visits were found meeting these conditions  Showing future appointments within next 150 days and meeting all other requirements       The patient was identified by name and date of birth  Valera Nissen was informed that this is a telemedicine visit and that the visit is being conducted through [address] [place] [address] Now and patient was informed that this is a secure, HIPAA-compliant platform  She agrees to proceed     My office door was closed  No one else was in the room  She acknowledged consent and understanding of privacy and security of the video platform  The patient has agreed to participate and understands they can discontinue the visit at any time  Patient is aware this is a billable service  Subjective  Valera Nissen is a 60 y o  female sick         Sick, sore throat, for 2 days, COVID positive,       Past Medical History:   Diagnosis Date    Diabetes mellitus ([facility] )     Hypercholesterolemia     Hypertension     Rheumatoid arthritis ([facility] )     Sarcoidosis        Past Surgical History:   Procedure Laterality Date    CHOLECYSTECTOMY      COLONOSCOPY  05/15/2009    Abnormal     MAMMO (HISTORICAL)  09/30/2016    Negative        Current Outpatient Medications   Medication Sig Dispense Refill    Dulaglutide 3 MG/0 5ML SOPN Inject 0 5 mL (3 mg total) under the skin once a week 6 mL 1    Insulin Pen Needle 29G X 10MM MISC Use once a week 13 each 1    Janumet  MG per tablet Take 1 tablet by mouth 2 (two) times a day with meals 180 tablet 1    losartan-hydrochlorothiazide (HYZAAR) 100-25 MG per tablet Take 1 tablet by mouth daily 90 tablet 1    meloxicam (Mobic) 15 mg tablet Take 1 tablet (15 mg total) by mouth daily 30 tablet 0    nirmatrelvir & ritonavir (Paxlovid) tablet therapy pack Take 3 tablets by mouth 2 (two) times a day for 5 days 30 tablet 0    rosuvastatin (CRESTOR) 10 MG tablet Take 1 tablet (10 mg total) by mouth daily 90 tablet 1    UltiCare Mini Pen Needles 31G X 6 MM MISC       verapamil (VERELAN) 240 MG 24 hr capsule Take 1 capsule (240 mg total) by mouth daily at bedtime 90 capsule 1     No current facility-administered medications for this visit  Allergies   Allergen Reactions    Sulfa Antibiotics        Review of Systems   Constitutional: Negative for chills and fever  HENT: Negative for congestion, ear pain and sore throat  Eyes: Negative for pain  Respiratory: Positive for cough  Negative for shortness of breath  Cardiovascular: Negative for chest pain and leg swelling  Gastrointestinal: Negative for abdominal pain, nausea and vomiting  Endocrine: Negative for polyuria  Genitourinary: Negative for difficulty urinating, frequency and urgency  Musculoskeletal: Negative for arthralgias and back pain     Skin: Negative for rash    Neurological: Negative for weakness and headaches  Psychiatric/Behavioral: Negative for sleep disturbance  The patient is not nervous/anxious  Video Exam    Vitals:    07/28/22 1604   Weight: 83 9 kg (185 lb)   Height: 4' 10" (1 473 m)       Physical Exam  Eyes:      Extraocular Movements: Extraocular movements intact  Conjunctiva/sclera: Conjunctivae normal    Neurological:      General: No focal deficit present  Mental Status: She is alert and oriented to person, place, and time  I spent 20 minutes directly with the patient during this visit    VIRTUAL VISIT DISCLAIMER      Felice Moe verbally agrees to participate in [employer]  Pt is aware that [employer] could be limited without vital signs or the ability to perform a full hands-on physical Tammietrinity Leary understands she or the provider may request at any time to terminate the video visit and request the patient to seek care or treatment in person

## 2022-08-18 NOTE — PROGRESS NOTES
Patient has not returned or communicated intent to return to physical therapy for greater than 30 days  POC will be discharged at this time

## 2022-08-25 ENCOUNTER — RA CDI HCC (OUTPATIENT)
Dept: OTHER | Facility: HOSPITAL | Age: 62
End: 2022-08-25

## 2022-08-25 NOTE — PROGRESS NOTES
[facility]  coding opportunities       Chart reviewed, no opportunity found: CHART REVIEWED, NO OPPORTUNITY FOUND        Patients Insurance        Commercial Insurance: [employer]

## 2022-08-30 ENCOUNTER — OFFICE VISIT (OUTPATIENT)
Dept: INTERNAL MEDICINE CLINIC | Facility: CLINIC | Age: 62
End: 2022-08-30
Payer: COMMERCIAL

## 2022-08-30 VITALS
BODY MASS INDEX: 38.83 KG/M2 | TEMPERATURE: 98.3 F | HEIGHT: 58 IN | SYSTOLIC BLOOD PRESSURE: 130 MMHG | DIASTOLIC BLOOD PRESSURE: 78 MMHG | HEART RATE: 92 BPM | OXYGEN SATURATION: 98 % | WEIGHT: 185 LBS

## 2022-08-30 DIAGNOSIS — M17.0 PRIMARY OSTEOARTHRITIS OF BOTH KNEES: ICD-10-CM

## 2022-08-30 DIAGNOSIS — I10 ESSENTIAL HYPERTENSION: ICD-10-CM

## 2022-08-30 DIAGNOSIS — M75.82 ROTATOR CUFF TENDONITIS, LEFT: ICD-10-CM

## 2022-08-30 DIAGNOSIS — E11.65 UNCONTROLLED TYPE 2 DIABETES MELLITUS WITH HYPERGLYCEMIA (HCC): Primary | ICD-10-CM

## 2022-08-30 DIAGNOSIS — I83.813 VARICOSE VEINS OF BOTH LOWER EXTREMITIES WITH PAIN: ICD-10-CM

## 2022-08-30 DIAGNOSIS — E11.9 TYPE 2 DIABETES MELLITUS WITHOUT COMPLICATION, WITH LONG-TERM CURRENT USE OF INSULIN (HCC): ICD-10-CM

## 2022-08-30 DIAGNOSIS — E78.2 MIXED HYPERLIPIDEMIA: ICD-10-CM

## 2022-08-30 DIAGNOSIS — Z79.4 TYPE 2 DIABETES MELLITUS WITHOUT COMPLICATION, WITH LONG-TERM CURRENT USE OF INSULIN (HCC): ICD-10-CM

## 2022-08-30 PROCEDURE — 3078F DIAST BP <80 MM HG: CPT | Performed by: INTERNAL MEDICINE

## 2022-08-30 PROCEDURE — 3075F SYST BP GE 130 - 139MM HG: CPT | Performed by: INTERNAL MEDICINE

## 2022-08-30 PROCEDURE — 99214 OFFICE O/P EST MOD 30 MIN: CPT | Performed by: INTERNAL MEDICINE

## 2022-08-30 PROCEDURE — 3725F SCREEN DEPRESSION PERFORMED: CPT | Performed by: INTERNAL MEDICINE

## 2022-08-30 RX ORDER — LOSARTAN POTASSIUM AND HYDROCHLOROTHIAZIDE 25; 100 MG/1; MG/1
1 TABLET ORAL DAILY
Qty: 90 TABLET | Refills: 1 | Status: SHIPPED | OUTPATIENT
Start: 2022-08-30 | End: 2022-10-05 | Stop reason: SDUPTHER

## 2022-08-30 RX ORDER — ROSUVASTATIN CALCIUM 10 MG/1
10 TABLET, COATED ORAL DAILY
Qty: 90 TABLET | Refills: 1 | Status: SHIPPED | OUTPATIENT
Start: 2022-08-30 | End: 2022-10-05 | Stop reason: SDUPTHER

## 2022-08-30 RX ORDER — VERAPAMIL HYDROCHLORIDE 240 MG/1
240 CAPSULE, EXTENDED RELEASE ORAL
Qty: 90 CAPSULE | Refills: 1 | Status: SHIPPED | OUTPATIENT
Start: 2022-08-30 | End: 2022-10-05 | Stop reason: SDUPTHER

## 2022-08-30 RX ORDER — MELOXICAM 15 MG/1
15 TABLET ORAL DAILY
Qty: 30 TABLET | Refills: 0 | Status: SHIPPED | OUTPATIENT
Start: 2022-08-30

## 2022-08-30 RX ORDER — SITAGLIPTIN AND METFORMIN HYDROCHLORIDE 500; 50 MG/1; MG/1
1 TABLET, FILM COATED ORAL 2 TIMES DAILY WITH MEALS
Qty: 180 TABLET | Refills: 1 | Status: SHIPPED | OUTPATIENT
Start: 2022-08-30 | End: 2022-10-05 | Stop reason: SDUPTHER

## 2022-08-30 NOTE — PROGRESS NOTES
Assessment/Plan:      Depression Screening and Follow-up Plan: Patient was screened for depression during today's encounter  They screened negative with a PHQ-2 score of 0             1  Uncontrolled type 2 diabetes mellitus with hyperglycemia (HCC)  -     Dulaglutide 3 MG/0 5ML SOPN; Inject 0 5 mL (3 mg total) under the skin once a week  -     Insulin Pen Needle 29G X 10MM MISC; Use once a week  -     CBC and differential; Future  -     Comprehensive metabolic panel; Future  -     Hemoglobin A1C; Future  -     Lipid Panel with Direct LDL reflex; Future  -     Microalbumin / creatinine urine ratio  -     TSH, 3rd generation; Future    2  Essential hypertension  -     losartan-hydrochlorothiazide (HYZAAR) 100-25 MG per tablet; Take 1 tablet by mouth daily  -     verapamil (VERELAN) 240 MG 24 hr capsule; Take 1 capsule (240 mg total) by mouth daily at bedtime    3  Rotator cuff tendonitis, left  -     meloxicam (Mobic) 15 mg tablet; Take 1 tablet (15 mg total) by mouth daily    4  Mixed hyperlipidemia  -     rosuvastatin (CRESTOR) 10 MG tablet; Take 1 tablet (10 mg total) by mouth daily    5  Varicose veins of both lower extremities with pain    6  Type 2 diabetes mellitus without complication, with long-term current use of insulin (HCC)  -     Janumet  MG per tablet; Take 1 tablet by mouth 2 (two) times a day with meals    7  Primary osteoarthritis of both knees  -     Ambulatory Referral to Orthopedic Surgery; Future         Subjective:      Patient ID: True Bright is a 60 y o  female  Follow-up on multiple medical problems to ensure the stable on current medications      The following portions of the patient's history were reviewed and updated as appropriate: She  has a past medical history of Diabetes mellitus ([address] ), Hypercholesterolemia, Hypertension, Rheumatoid arthritis ([address] ), and Sarcoidosis    She   Patient Active Problem List    Diagnosis Date Noted    Rotator cuff tendonitis, left 04/08/2022    Nausea 05/18/2021    COVID-19 virus detected 05/18/2021    Mixed hyperlipidemia 05/18/2021    Essential hypertension, benign 04/21/2021    Primary osteoarthritis of both knees 04/21/2021    Varicose veins of both lower extremities with pain 03/30/2021    Body mass index 39 0-39 9, adult 03/30/2021    Uncontrolled type 2 diabetes mellitus with hyperglycemia [facility] ) 03/30/2021    Encounter for screening mammogram for malignant neoplasm of breast 03/30/2021    Encounter for screening colonoscopy 03/30/2021    Moderate persistent asthma without complication 13/56/9779    Exposure to SARS-associated coronavirus 09/10/2020    Allergic rhinitis 03/18/2020    Asthma 03/18/2020    Hyperlipidemia 03/18/2020    Sarcoidosis 03/18/2020    Thalassemia 03/18/2020    Vitamin D deficiency 03/18/2020    Diabetes mellitus ([facility] ) 03/14/2019    Essential hypertension 03/14/2019     She  has a past surgical history that includes Colonoscopy (05/15/2009); Cholecystectomy; and Mammo (historical) (09/30/2016)  Her family history includes Coronary artery disease in her father; Diabetes in her maternal grandmother and mother; Stomach cancer in her maternal grandfather  She  reports that she has never smoked  She has never used smokeless tobacco  She reports previous alcohol use  She reports that she does not use drugs    Current Outpatient Medications   Medication Sig Dispense Refill    Dulaglutide 3 MG/0 5ML SOPN Inject 0 5 mL (3 mg total) under the skin once a week 6 mL 1    Insulin Pen Needle 29G X 10MM MISC Use once a week 13 each 1    Janumet  MG per tablet Take 1 tablet by mouth 2 (two) times a day with meals 180 tablet 1    losartan-hydrochlorothiazide (HYZAAR) 100-25 MG per tablet Take 1 tablet by mouth daily 90 tablet 1    meloxicam (Mobic) 15 mg tablet Take 1 tablet (15 mg total) by mouth daily 30 tablet 0    rosuvastatin (CRESTOR) 10 MG tablet Take 1 tablet (10 mg total) by mouth daily 90 tablet 1    UltiCare Mini Pen Needles 31G X 6 MM MISC       verapamil (VERELAN) 240 MG 24 hr capsule Take 1 capsule (240 mg total) by mouth daily at bedtime 90 capsule 1     No current facility-administered medications for this visit  Current Outpatient Medications on File Prior to Visit   Medication Sig    UltiCare Mini Pen Needles 31G X 6 MM MISC     [DISCONTINUED] Dulaglutide 3 MG/0 5ML SOPN Inject 0 5 mL (3 mg total) under the skin once a week    [DISCONTINUED] Insulin Pen Needle 29G X 10MM MISC Use once a week    [DISCONTINUED] Janumet  MG per tablet Take 1 tablet by mouth 2 (two) times a day with meals    [DISCONTINUED] losartan-hydrochlorothiazide (HYZAAR) 100-25 MG per tablet Take 1 tablet by mouth daily    [DISCONTINUED] meloxicam (Mobic) 15 mg tablet Take 1 tablet (15 mg total) by mouth daily    [DISCONTINUED] rosuvastatin (CRESTOR) 10 MG tablet Take 1 tablet (10 mg total) by mouth daily    [DISCONTINUED] verapamil (VERELAN) 240 MG 24 hr capsule Take 1 capsule (240 mg total) by mouth daily at bedtime     No current facility-administered medications on file prior to visit  She is allergic to sulfa antibiotics       Review of Systems   Constitutional: Negative for chills and fever  HENT: Negative for congestion, ear pain and sore throat  Eyes: Negative for pain  Respiratory: Negative for cough and shortness of breath  Cardiovascular: Negative for chest pain and leg swelling  Gastrointestinal: Negative for abdominal pain, nausea and vomiting  Endocrine: Negative for polyuria  Genitourinary: Negative for difficulty urinating, frequency and urgency  Musculoskeletal: Positive for arthralgias  Negative for back pain  Skin: Negative for rash  Neurological: Negative for weakness and headaches  Psychiatric/Behavioral: Negative for sleep disturbance  The patient is not nervous/anxious            Objective:      /78 (BP Location: Left arm, Patient Position: Sitting, Cuff Size: Large)   Pulse 92   Temp 98 3 °F (36 8 °C) (Temporal)   Ht 4' 10" (1 473 m)   Wt 83 9 kg (185 lb)   SpO2 98%   BMI 38 67 kg/m²     No results found for this or any previous visit (from the past 1344 hour(s))  Physical Exam  Constitutional:       Appearance: Normal appearance  HENT:      Head: Normocephalic  Right Ear: Tympanic membrane, ear canal and external ear normal       Left Ear: Tympanic membrane, ear canal and external ear normal       Nose: Nose normal  No congestion  Mouth/Throat:      Mouth: Mucous membranes are moist       Pharynx: Oropharynx is clear  No oropharyngeal exudate or posterior oropharyngeal erythema  Eyes:      Extraocular Movements: Extraocular movements intact  Conjunctiva/sclera: Conjunctivae normal       Pupils: Pupils are equal, round, and reactive to light  Cardiovascular:      Rate and Rhythm: Normal rate and regular rhythm  Heart sounds: Normal heart sounds  No murmur heard  Pulmonary:      Effort: Pulmonary effort is normal       Breath sounds: Normal breath sounds  No wheezing or rales  Abdominal:      General: Bowel sounds are normal  There is no distension  Palpations: Abdomen is soft  Tenderness: There is no abdominal tenderness  Musculoskeletal:         General: Normal range of motion  Cervical back: Normal range of motion and neck supple  Right lower leg: No edema  Left lower leg: No edema  Lymphadenopathy:      Cervical: No cervical adenopathy  Skin:     General: Skin is warm  Neurological:      General: No focal deficit present  Mental Status: She is alert and oriented to person, place, and time

## 2022-10-03 LAB
CREAT ?TM UR-SCNC: 103 UMOL/L
EXT MICROALBUMIN URINE RANDOM: 1.4
HBA1C MFR BLD HPLC: 7.9 %
MICROALBUMIN/CREAT UR: 13.6 MG/G{CREAT}

## 2022-10-05 ENCOUNTER — OFFICE VISIT (OUTPATIENT)
Dept: INTERNAL MEDICINE CLINIC | Facility: CLINIC | Age: 62
End: 2022-10-05
Payer: COMMERCIAL

## 2022-10-05 VITALS
DIASTOLIC BLOOD PRESSURE: 2 MMHG | WEIGHT: 182.4 LBS | HEIGHT: 58 IN | BODY MASS INDEX: 38.29 KG/M2 | OXYGEN SATURATION: 97 % | SYSTOLIC BLOOD PRESSURE: 134 MMHG | HEART RATE: 92 BPM | TEMPERATURE: 98 F

## 2022-10-05 DIAGNOSIS — Z12.31 ENCOUNTER FOR SCREENING MAMMOGRAM FOR MALIGNANT NEOPLASM OF BREAST: ICD-10-CM

## 2022-10-05 DIAGNOSIS — E78.2 MIXED HYPERLIPIDEMIA: ICD-10-CM

## 2022-10-05 DIAGNOSIS — E11.65 UNCONTROLLED TYPE 2 DIABETES MELLITUS WITH HYPERGLYCEMIA (HCC): Primary | ICD-10-CM

## 2022-10-05 DIAGNOSIS — E11.9 TYPE 2 DIABETES MELLITUS WITHOUT COMPLICATION, WITH LONG-TERM CURRENT USE OF INSULIN (HCC): ICD-10-CM

## 2022-10-05 DIAGNOSIS — I10 ESSENTIAL HYPERTENSION: ICD-10-CM

## 2022-10-05 DIAGNOSIS — Z79.4 TYPE 2 DIABETES MELLITUS WITHOUT COMPLICATION, WITH LONG-TERM CURRENT USE OF INSULIN (HCC): ICD-10-CM

## 2022-10-05 DIAGNOSIS — M17.0 PRIMARY OSTEOARTHRITIS OF BOTH KNEES: ICD-10-CM

## 2022-10-05 DIAGNOSIS — M75.82 ROTATOR CUFF TENDONITIS, LEFT: ICD-10-CM

## 2022-10-05 DIAGNOSIS — I83.813 VARICOSE VEINS OF BOTH LOWER EXTREMITIES WITH PAIN: ICD-10-CM

## 2022-10-05 PROCEDURE — 99214 OFFICE O/P EST MOD 30 MIN: CPT | Performed by: INTERNAL MEDICINE

## 2022-10-05 RX ORDER — SITAGLIPTIN AND METFORMIN HYDROCHLORIDE 500; 50 MG/1; MG/1
1 TABLET, FILM COATED ORAL 2 TIMES DAILY WITH MEALS
Qty: 180 TABLET | Refills: 1 | Status: SHIPPED | OUTPATIENT
Start: 2022-10-05

## 2022-10-05 RX ORDER — ROSUVASTATIN CALCIUM 10 MG/1
10 TABLET, COATED ORAL DAILY
Qty: 90 TABLET | Refills: 1 | Status: SHIPPED | OUTPATIENT
Start: 2022-10-05

## 2022-10-05 RX ORDER — LOSARTAN POTASSIUM AND HYDROCHLOROTHIAZIDE 25; 100 MG/1; MG/1
1 TABLET ORAL DAILY
Qty: 90 TABLET | Refills: 1 | Status: SHIPPED | OUTPATIENT
Start: 2022-10-05

## 2022-10-05 RX ORDER — VERAPAMIL HYDROCHLORIDE 240 MG/1
240 CAPSULE, EXTENDED RELEASE ORAL
Qty: 90 CAPSULE | Refills: 1 | Status: SHIPPED | OUTPATIENT
Start: 2022-10-05

## 2022-10-05 NOTE — PROGRESS NOTES
Assessment/Plan:             1  Uncontrolled type 2 diabetes mellitus with hyperglycemia (HCC)  -     Dulaglutide 3 MG/0 5ML SOPN; Inject 0 5 mL (3 mg total) under the skin once a week    2  Encounter for screening mammogram for malignant neoplasm of breast  -     Mammo screening bilateral w 3d & cad; Future; Expected date: 10/05/2022    3  Essential hypertension  -     losartan-hydrochlorothiazide (HYZAAR) 100-25 MG per tablet; Take 1 tablet by mouth daily  -     verapamil (VERELAN) 240 MG 24 hr capsule; Take 1 capsule (240 mg total) by mouth daily at bedtime    4  Mixed hyperlipidemia  -     rosuvastatin (CRESTOR) 10 MG tablet; Take 1 tablet (10 mg total) by mouth daily    5  Primary osteoarthritis of both knees    6  Rotator cuff tendonitis, left    7  Varicose veins of both lower extremities with pain    8  Type 2 diabetes mellitus without complication, with long-term current use of insulin (HCC)  -     Janumet  MG per tablet; Take 1 tablet by mouth 2 (two) times a day with meals         Subjective:      Patient ID: Felice Moe is a 60 y o  female  Follow-up on blood and urine test done on 10/03/2022 test discussed with her      The following portions of the patient's history were reviewed and updated as appropriate: She  has a past medical history of Diabetes mellitus ([address] ), Hypercholesterolemia, Hypertension, Rheumatoid arthritis ([address] ), and Sarcoidosis    She   Patient Active Problem List    Diagnosis Date Noted    Rotator cuff tendonitis, left 04/08/2022    Nausea 05/18/2021    COVID-19 virus detected 05/18/2021    Mixed hyperlipidemia 05/18/2021    Essential hypertension, benign 04/21/2021    Primary osteoarthritis of both knees 04/21/2021    Varicose veins of both lower extremities with pain 03/30/2021    Body mass index 39 0-39 9, adult 03/30/2021    Uncontrolled type 2 diabetes mellitus with hyperglycemia ([address] ) 03/30/2021    Encounter for screening mammogram for malignant neoplasm of breast 03/30/2021    Encounter for screening colonoscopy 03/30/2021    Moderate persistent asthma without complication 30/95/3238    Exposure to SARS-associated coronavirus 09/10/2020    Allergic rhinitis 03/18/2020    Asthma 03/18/2020    Hyperlipidemia 03/18/2020    Sarcoidosis 03/18/2020    Thalassemia 03/18/2020    Vitamin D deficiency 03/18/2020    Diabetes mellitus ([address] ) 03/14/2019    Essential hypertension 03/14/2019     She  has a past surgical history that includes Colonoscopy (05/15/2009); Cholecystectomy; and Mammo (historical) (09/30/2016)  Her family history includes Coronary artery disease in her father; Diabetes in her maternal grandmother and mother; Stomach cancer in her maternal grandfather  She  reports that she has never smoked  She has never used smokeless tobacco  She reports previous alcohol use  She reports that she does not use drugs  Current Outpatient Medications   Medication Sig Dispense Refill    Dulaglutide 3 MG/0 5ML SOPN Inject 0 5 mL (3 mg total) under the skin once a week 6 mL 1    Insulin Pen Needle 29G X 10MM MISC Use once a week 13 each 1    Janumet  MG per tablet Take 1 tablet by mouth 2 (two) times a day with meals 180 tablet 1    losartan-hydrochlorothiazide (HYZAAR) 100-25 MG per tablet Take 1 tablet by mouth daily 90 tablet 1    meloxicam (Mobic) 15 mg tablet Take 1 tablet (15 mg total) by mouth daily 30 tablet 0    rosuvastatin (CRESTOR) 10 MG tablet Take 1 tablet (10 mg total) by mouth daily 90 tablet 1    verapamil (VERELAN) 240 MG 24 hr capsule Take 1 capsule (240 mg total) by mouth daily at bedtime 90 capsule 1    UltiCare Mini Pen Needles 31G X 6 MM MISC  (Patient not taking: Reported on 10/5/2022)       No current facility-administered medications for this visit       Current Outpatient Medications on File Prior to Visit   Medication Sig    Insulin Pen Needle 29G X 10MM MISC Use once a week    meloxicam (Mobic) 15 mg tablet Take 1 tablet (15 mg total) by mouth daily    [DISCONTINUED] Dulaglutide 3 MG/0 5ML SOPN Inject 0 5 mL (3 mg total) under the skin once a week    [DISCONTINUED] Janumet  MG per tablet Take 1 tablet by mouth 2 (two) times a day with meals    [DISCONTINUED] losartan-hydrochlorothiazide (HYZAAR) 100-25 MG per tablet Take 1 tablet by mouth daily    [DISCONTINUED] rosuvastatin (CRESTOR) 10 MG tablet Take 1 tablet (10 mg total) by mouth daily    [DISCONTINUED] verapamil (VERELAN) 240 MG 24 hr capsule Take 1 capsule (240 mg total) by mouth daily at bedtime    UltiCare Mini Pen Needles 31G X 6 MM MISC  (Patient not taking: Reported on 10/5/2022)     No current facility-administered medications on file prior to visit  She is allergic to sulfa antibiotics       Review of Systems   Constitutional: Negative for chills and fever  HENT: Negative for congestion, ear pain and sore throat  Eyes: Negative for pain  Respiratory: Negative for cough and shortness of breath  Cardiovascular: Negative for chest pain and leg swelling  Gastrointestinal: Negative for abdominal pain, nausea and vomiting  Endocrine: Negative for polyuria  Genitourinary: Negative for difficulty urinating, frequency and urgency  Musculoskeletal: Positive for arthralgias  Negative for back pain  Skin: Negative for rash  Neurological: Negative for weakness and headaches  Psychiatric/Behavioral: Negative for sleep disturbance  The patient is not nervous/anxious            Objective:      BP (!) 134/2 (BP Location: Left arm, Patient Position: Sitting, Cuff Size: Large)   Pulse 92   Temp 98 °F (36 7 °C) (Temporal)   Ht 4' 10" (1 473 m)   Wt 82 7 kg (182 lb 6 4 oz)   SpO2 97%   BMI 38 12 kg/m²     Recent Results (from the past 1344 hour(s))   HEMOGLOBIN A1C    Collection Time: 10/03/22  8:08 AM   Result Value Ref Range    Hemoglobin A1C 7 9 (H) <5 7 %    eAG, EST AVG Glucose 180 (H) <154 mg/dL        Physical Exam  Constitutional: Appearance: Normal appearance  HENT:      Head: Normocephalic  Right Ear: Tympanic membrane, ear canal and external ear normal       Left Ear: Tympanic membrane, ear canal and external ear normal       Nose: Nose normal  No congestion  Mouth/Throat:      Mouth: Mucous membranes are moist       Pharynx: Oropharynx is clear  No oropharyngeal exudate or posterior oropharyngeal erythema  Eyes:      Extraocular Movements: Extraocular movements intact  Conjunctiva/sclera: Conjunctivae normal       Pupils: Pupils are equal, round, and reactive to light  Cardiovascular:      Rate and Rhythm: Normal rate and regular rhythm  Heart sounds: Normal heart sounds  No murmur heard  Pulmonary:      Effort: Pulmonary effort is normal       Breath sounds: Normal breath sounds  No wheezing or rales  Abdominal:      General: Bowel sounds are normal  There is no distension  Palpations: Abdomen is soft  Tenderness: There is no abdominal tenderness  Musculoskeletal:         General: Normal range of motion  Cervical back: Normal range of motion and neck supple  Right lower leg: No edema  Left lower leg: No edema  Lymphadenopathy:      Cervical: No cervical adenopathy  Skin:     General: Skin is warm  Neurological:      General: No focal deficit present  Mental Status: She is alert and oriented to person, place, and time

## 2022-10-20 ENCOUNTER — TELEPHONE (OUTPATIENT)
Dept: INTERNAL MEDICINE CLINIC | Facility: CLINIC | Age: 62
End: 2022-10-20

## 2022-10-21 ENCOUNTER — CONSULT (OUTPATIENT)
Dept: INTERNAL MEDICINE CLINIC | Facility: CLINIC | Age: 62
End: 2022-10-21
Payer: COMMERCIAL

## 2022-10-21 VITALS
OXYGEN SATURATION: 97 % | SYSTOLIC BLOOD PRESSURE: 132 MMHG | HEIGHT: 58 IN | DIASTOLIC BLOOD PRESSURE: 82 MMHG | WEIGHT: 184 LBS | BODY MASS INDEX: 38.62 KG/M2 | TEMPERATURE: 98 F | HEART RATE: 86 BPM

## 2022-10-21 DIAGNOSIS — Z01.818 PREOP EXAM FOR INTERNAL MEDICINE: Primary | ICD-10-CM

## 2022-10-21 DIAGNOSIS — E78.2 MIXED HYPERLIPIDEMIA: ICD-10-CM

## 2022-10-21 DIAGNOSIS — E11.65 UNCONTROLLED TYPE 2 DIABETES MELLITUS WITH HYPERGLYCEMIA (HCC): ICD-10-CM

## 2022-10-21 DIAGNOSIS — I10 ESSENTIAL HYPERTENSION: ICD-10-CM

## 2022-10-21 DIAGNOSIS — M17.0 PRIMARY OSTEOARTHRITIS OF BOTH KNEES: ICD-10-CM

## 2022-10-21 DIAGNOSIS — M75.82 ROTATOR CUFF TENDONITIS, LEFT: ICD-10-CM

## 2022-10-21 DIAGNOSIS — I83.813 VARICOSE VEINS OF BOTH LOWER EXTREMITIES WITH PAIN: ICD-10-CM

## 2022-10-21 PROCEDURE — 99214 OFFICE O/P EST MOD 30 MIN: CPT | Performed by: INTERNAL MEDICINE

## 2022-10-21 RX ORDER — CIPROFLOXACIN 500 MG/1
TABLET, FILM COATED ORAL
COMMUNITY
Start: 2022-10-20

## 2022-10-21 RX ORDER — CIPROFLOXACIN 500 MG/1
500 TABLET, FILM COATED ORAL 2 TIMES DAILY
COMMUNITY
Start: 2022-10-20 | End: 2022-10-27

## 2022-10-21 NOTE — PROGRESS NOTES
Assessment/Plan:             1  Preop exam for internal medicine    2  Primary osteoarthritis of both knees    3  Uncontrolled type 2 diabetes mellitus with hyperglycemia ([address] )    4  Essential hypertension    5  Mixed hyperlipidemia    6  Rotator cuff tendonitis, left    7  Varicose veins of both lower extremities with pain         Subjective:      Patient ID: Galilea Whyte is a 60 y o  female  Pre op for left total knee replacement on 10/25/2022      The following portions of the patient's history were reviewed and updated as appropriate: She  has a past medical history of Diabetes mellitus ([address] ), Hypercholesterolemia, Hypertension, Rheumatoid arthritis ([address] ), and Sarcoidosis  She   Patient Active Problem List    Diagnosis Date Noted   • Rotator cuff tendonitis, left 04/08/2022   • Nausea 05/18/2021   • COVID-19 virus detected 05/18/2021   • Mixed hyperlipidemia 05/18/2021   • Essential hypertension, benign 04/21/2021   • Primary osteoarthritis of both knees 04/21/2021   • Varicose veins of both lower extremities with pain 03/30/2021   • Body mass index 39 0-39 9, adult 03/30/2021   • Uncontrolled type 2 diabetes mellitus with hyperglycemia ([facility] ) 03/30/2021   • Encounter for screening mammogram for malignant neoplasm of breast 03/30/2021   • Encounter for screening colonoscopy 03/30/2021   • Moderate persistent asthma without complication 31/49/0037   • Exposure to SARS-associated coronavirus 09/10/2020   • Allergic rhinitis 03/18/2020   • Asthma 03/18/2020   • Hyperlipidemia 03/18/2020   • Sarcoidosis 03/18/2020   • Thalassemia 03/18/2020   • Vitamin D deficiency 03/18/2020   • Diabetes mellitus ([address] ) 03/14/2019   • Essential hypertension 03/14/2019     She  has a past surgical history that includes Colonoscopy (05/15/2009); Cholecystectomy; and Mammo (historical) (09/30/2016)    Her family history includes Coronary artery disease in her father; Diabetes in her maternal grandmother and mother; Stomach cancer in her maternal grandfather  She  reports that she has never smoked  She has never used smokeless tobacco  She reports previous alcohol use  She reports that she does not use drugs  Current Outpatient Medications   Medication Sig Dispense Refill   • ciprofloxacin (CIPRO) 500 mg tablet Take 500 mg by mouth 2 (two) times a day     • Dulaglutide 3 MG/0 5ML SOPN Inject 0 5 mL (3 mg total) under the skin once a week 6 mL 1   • Insulin Pen Needle 29G X 10MM MISC Use once a week 13 each 1   • Janumet  MG per tablet Take 1 tablet by mouth 2 (two) times a day with meals 180 tablet 1   • losartan-hydrochlorothiazide (HYZAAR) 100-25 MG per tablet Take 1 tablet by mouth daily 90 tablet 1   • rosuvastatin (CRESTOR) 10 MG tablet Take 1 tablet (10 mg total) by mouth daily 90 tablet 1   • verapamil (VERELAN) 240 MG 24 hr capsule Take 1 capsule (240 mg total) by mouth daily at bedtime 90 capsule 1   • ciprofloxacin (CIPRO) 500 mg tablet take 1 tablet by mouth twice a day for 7 days (Patient not taking: Reported on 10/21/2022)     • meloxicam (Mobic) 15 mg tablet Take 1 tablet (15 mg total) by mouth daily (Patient not taking: Reported on 10/21/2022) 30 tablet 0   • UltiCare Mini Pen Needles 31G X 6 MM MISC  (Patient not taking: No sig reported)       No current facility-administered medications for this visit       Current Outpatient Medications on File Prior to Visit   Medication Sig   • ciprofloxacin (CIPRO) 500 mg tablet Take 500 mg by mouth 2 (two) times a day   • Dulaglutide 3 MG/0 5ML SOPN Inject 0 5 mL (3 mg total) under the skin once a week   • Insulin Pen Needle 29G X 10MM MISC Use once a week   • Janumet  MG per tablet Take 1 tablet by mouth 2 (two) times a day with meals   • losartan-hydrochlorothiazide (HYZAAR) 100-25 MG per tablet Take 1 tablet by mouth daily   • rosuvastatin (CRESTOR) 10 MG tablet Take 1 tablet (10 mg total) by mouth daily   • verapamil (VERELAN) 240 MG 24 hr capsule Take 1 capsule (240 mg total) by mouth daily at bedtime   • ciprofloxacin (CIPRO) 500 mg tablet take 1 tablet by mouth twice a day for 7 days (Patient not taking: Reported on 10/21/2022)   • meloxicam (Mobic) 15 mg tablet Take 1 tablet (15 mg total) by mouth daily (Patient not taking: Reported on 10/21/2022)   • UltiCare Mini Pen Needles 31G X 6 MM MISC  (Patient not taking: No sig reported)     No current facility-administered medications on file prior to visit  She is allergic to sulfa antibiotics       Review of Systems   Constitutional: Negative for chills and fever  HENT: Negative for congestion, ear pain and sore throat  Eyes: Negative for pain  Respiratory: Negative for cough and shortness of breath  Cardiovascular: Negative for chest pain and leg swelling  Gastrointestinal: Negative for abdominal pain, nausea and vomiting  Endocrine: Negative for polyuria  Genitourinary: Negative for difficulty urinating, frequency and urgency  Musculoskeletal: Positive for arthralgias  Negative for back pain  Skin: Negative for rash  Neurological: Negative for weakness and headaches  Psychiatric/Behavioral: Negative for sleep disturbance  The patient is not nervous/anxious  Objective:      /82 (BP Location: Left arm, Patient Position: Sitting, Cuff Size: Large)   Pulse 86   Temp 98 °F (36 7 °C) (Temporal)   Ht 4' 10" (1 473 m)   Wt 83 5 kg (184 lb)   SpO2 97%   BMI 38 46 kg/m²     Recent Results (from the past 1344 hour(s))   Hemoglobin A1C    Collection Time: 10/03/22 12:00 AM   Result Value Ref Range    Hemoglobin A1C 7 9    Microalbumin / creatinine urine ratio    Collection Time: 10/03/22 12:00 AM   Result Value Ref Range    EXT Creatinine Urine 103 0     Microalbum  ,U,Random 1 4     EXTERNAL Microalb/Creat Ratio 13 6    HEMOGLOBIN A1C    Collection Time: 10/03/22  8:08 AM   Result Value Ref Range    Hemoglobin A1C 7 9 (H) <5 7 %    eAG, EST AVG Glucose 180 (H) <154 mg/dL   HEMOGLOBIN A1C Collection Time: 10/14/22 12:26 PM   Result Value Ref Range    Hemoglobin A1C 8 0 (H) <5 7 %    eAG, EST AVG Glucose 183 (H) <154 mg/dL        Physical Exam  Constitutional:       Appearance: Normal appearance  HENT:      Head: Normocephalic  Right Ear: Tympanic membrane, ear canal and external ear normal       Left Ear: Tympanic membrane, ear canal and external ear normal       Nose: Nose normal  No congestion  Mouth/Throat:      Mouth: Mucous membranes are moist       Pharynx: Oropharynx is clear  No oropharyngeal exudate or posterior oropharyngeal erythema  Eyes:      Extraocular Movements: Extraocular movements intact  Conjunctiva/sclera: Conjunctivae normal       Pupils: Pupils are equal, round, and reactive to light  Cardiovascular:      Rate and Rhythm: Normal rate and regular rhythm  Heart sounds: Normal heart sounds  No murmur heard  Pulmonary:      Effort: Pulmonary effort is normal       Breath sounds: Normal breath sounds  No wheezing or rales  Abdominal:      General: Bowel sounds are normal  There is no distension  Palpations: Abdomen is soft  Tenderness: There is no abdominal tenderness  Musculoskeletal:         General: Normal range of motion  Cervical back: Normal range of motion and neck supple  Right lower leg: No edema  Left lower leg: No edema  Lymphadenopathy:      Cervical: No cervical adenopathy  Skin:     General: Skin is warm  Neurological:      General: No focal deficit present  Mental Status: She is alert and oriented to person, place, and time

## 2022-10-28 ENCOUNTER — TRANSITIONAL CARE MANAGEMENT (OUTPATIENT)
Dept: INTERNAL MEDICINE CLINIC | Facility: OTHER | Age: 62
End: 2022-10-28

## 2022-11-04 ENCOUNTER — OFFICE VISIT (OUTPATIENT)
Dept: INTERNAL MEDICINE CLINIC | Facility: CLINIC | Age: 62
End: 2022-11-04

## 2022-11-04 VITALS
WEIGHT: 175.6 LBS | OXYGEN SATURATION: 97 % | HEART RATE: 96 BPM | TEMPERATURE: 97.6 F | HEIGHT: 58 IN | DIASTOLIC BLOOD PRESSURE: 80 MMHG | BODY MASS INDEX: 36.86 KG/M2 | SYSTOLIC BLOOD PRESSURE: 122 MMHG

## 2022-11-04 DIAGNOSIS — M17.12 PRIMARY OSTEOARTHRITIS OF LEFT KNEE: Primary | ICD-10-CM

## 2022-11-04 DIAGNOSIS — Z96.652 STATUS POST LEFT KNEE REPLACEMENT: ICD-10-CM

## 2022-11-04 DIAGNOSIS — E11.65 UNCONTROLLED TYPE 2 DIABETES MELLITUS WITH HYPERGLYCEMIA (HCC): ICD-10-CM

## 2022-11-04 DIAGNOSIS — E78.2 MIXED HYPERLIPIDEMIA: ICD-10-CM

## 2022-11-04 DIAGNOSIS — I10 ESSENTIAL HYPERTENSION: ICD-10-CM

## 2022-11-04 RX ORDER — AMOXICILLIN 250 MG
1 CAPSULE ORAL 2 TIMES DAILY
COMMUNITY
Start: 2022-10-26

## 2022-11-04 RX ORDER — ASPIRIN 81 MG/1
TABLET, COATED ORAL
COMMUNITY
Start: 2022-10-26

## 2022-11-04 RX ORDER — ONDANSETRON 4 MG/1
TABLET, FILM COATED ORAL
COMMUNITY
Start: 2022-10-28

## 2022-11-04 RX ORDER — OXYCODONE HYDROCHLORIDE 5 MG/1
TABLET ORAL
COMMUNITY
Start: 2022-10-26

## 2022-11-04 NOTE — PROGRESS NOTES
Transitional Care Management Review:  Anderson Melo is a 60 y o  female here for TCM follow up  During the TCM phone call patient stated:    TCM Call     Date and time call was made  10/28/2022  9:34 AM    Hospital care reviewed  Records reviewed    Patient was hospitialized at  Other (comment)    Comment  [facility]    Date of Admission  10/25/22    Date of discharge  10/27/22    Diagnosis  primary localized osteoarthritis of left knee, [place] total knee arthroplasty left knee    Disposition  Home    Current Symptoms  Leg pain - left side; Neausea; Incisional pain      TCM Call     Post hospital issues  Reduced activity; Poor ADL (Activities of Daily Living) performance    Scheduled for follow up? Yes    Did you obtain your prescribed medications  Yes    Do you need help managing your prescriptions or medications  No    Is transportation to your appointment needed  No    I have advised the patient to call PCP with any new or worsening symptoms  Marla Banuelos MD      Assessment/Plan:             1  Primary osteoarthritis of left knee    2  Status post left knee replacement    3  Uncontrolled type 2 diabetes mellitus with hyperglycemia ([address] )    4  Essential hypertension    5  Mixed hyperlipidemia         Subjective:      Patient ID: Anderson Melo is a 60 y o  female  Follow-up after hospitalization for left total knee replacement for arthritis, doing well, getting physical therapy,      The following portions of the patient's history were reviewed and updated as appropriate: She  has a past medical history of Diabetes mellitus ([address] ), Hypercholesterolemia, Hypertension, Rheumatoid arthritis ([address] ), and Sarcoidosis    She   Patient Active Problem List    Diagnosis Date Noted   • Primary osteoarthritis of left knee 11/04/2022   • Status post left knee replacement 11/04/2022   • Rotator cuff tendonitis, left 04/08/2022   • Nausea 05/18/2021   • COVID-19 virus detected 05/18/2021   • Mixed hyperlipidemia 05/18/2021   • Essential hypertension, benign 04/21/2021   • Primary osteoarthritis of both knees 04/21/2021   • Varicose veins of both lower extremities with pain 03/30/2021   • Body mass index 39 0-39 9, adult 03/30/2021   • Uncontrolled type 2 diabetes mellitus with hyperglycemia ([facility] ) 03/30/2021   • Encounter for screening mammogram for malignant neoplasm of breast 03/30/2021   • Encounter for screening colonoscopy 03/30/2021   • Moderate persistent asthma without complication 65/55/6456   • Exposure to SARS-associated coronavirus 09/10/2020   • Allergic rhinitis 03/18/2020   • Asthma 03/18/2020   • Hyperlipidemia 03/18/2020   • Sarcoidosis 03/18/2020   • Thalassemia 03/18/2020   • Vitamin D deficiency 03/18/2020   • Diabetes mellitus ([facility] ) 03/14/2019   • Essential hypertension 03/14/2019     She  has a past surgical history that includes Colonoscopy (05/15/2009); Cholecystectomy; and Mammo (historical) (09/30/2016)  Her family history includes Coronary artery disease in her father; Diabetes in her maternal grandmother and mother; Stomach cancer in her maternal grandfather  She  reports that she has never smoked  She has never used smokeless tobacco  She reports previous alcohol use  She reports that she does not use drugs    Current Outpatient Medications   Medication Sig Dispense Refill   • Aspirin Low Dose 81 MG EC tablet take 2 tablet by mouth once daily for SIX WEEKS AFTER SURGERY THEN DISCONITNUE     • Dulaglutide 3 MG/0 5ML SOPN Inject 0 5 mL (3 mg total) under the skin once a week 6 mL 1   • Insulin Pen Needle 29G X 10MM MISC Use once a week 13 each 1   • Janumet  MG per tablet Take 1 tablet by mouth 2 (two) times a day with meals 180 tablet 1   • losartan-hydrochlorothiazide (HYZAAR) 100-25 MG per tablet Take 1 tablet by mouth daily 90 tablet 1   • ondansetron (ZOFRAN) 4 mg tablet take 1 tablet by mouth once daily if needed for nausea and vomiting     • oxyCODONE (ROXICODONE) 5 immediate release tablet TAKE 1 TO 2 TABLETS BY MOUTH EVERY 6 HOURS AS NEEDED FOR POST OPERATIVE PAIN     • rosuvastatin (CRESTOR) 10 MG tablet Take 1 tablet (10 mg total) by mouth daily 90 tablet 1   • verapamil (VERELAN) 240 MG 24 hr capsule Take 1 capsule (240 mg total) by mouth daily at bedtime 90 capsule 1   • ciprofloxacin (CIPRO) 500 mg tablet take 1 tablet by mouth twice a day for 7 days (Patient not taking: No sig reported)     • meloxicam (Mobic) 15 mg tablet Take 1 tablet (15 mg total) by mouth daily (Patient not taking: No sig reported) 30 tablet 0   • senna-docusate sodium (SENOKOT S) 8 6-50 mg per tablet Take 1 tablet by mouth 2 (two) times a day (Patient not taking: No sig reported)     • UltiCare Mini Pen Needles 31G X 6 MM MISC  (Patient not taking: No sig reported)       No current facility-administered medications for this visit  She is allergic to sulfa antibiotics       Review of Systems   Constitutional: Negative for chills and fever  HENT: Negative for congestion, ear pain and sore throat  Eyes: Negative for pain  Respiratory: Negative for cough and shortness of breath  Cardiovascular: Negative for chest pain and leg swelling  Gastrointestinal: Negative for abdominal pain, nausea and vomiting  Endocrine: Negative for polyuria  Genitourinary: Negative for difficulty urinating, frequency and urgency  Musculoskeletal: Positive for arthralgias  Negative for back pain  Skin: Negative for rash  Neurological: Negative for weakness and headaches  Psychiatric/Behavioral: Negative for sleep disturbance  The patient is not nervous/anxious  Objective:      /80 (BP Location: Right arm, Patient Position: Sitting, Cuff Size: Large)   Pulse 96   Temp 97 6 °F (36 4 °C) (Temporal)   Ht 4' 10" (1 473 m)   Wt 79 7 kg (175 lb 9 6 oz)   SpO2 97%   BMI 36 70 kg/m²     No results found for this or any previous visit (from the past 336 hour(s))       Physical Exam  Constitutional:       Appearance: Normal appearance  HENT:      Head: Normocephalic  Right Ear: Tympanic membrane, ear canal and external ear normal       Left Ear: Tympanic membrane, ear canal and external ear normal       Nose: Nose normal  No congestion  Mouth/Throat:      Mouth: Mucous membranes are moist       Pharynx: Oropharynx is clear  No oropharyngeal exudate or posterior oropharyngeal erythema  Eyes:      Extraocular Movements: Extraocular movements intact  Conjunctiva/sclera: Conjunctivae normal       Pupils: Pupils are equal, round, and reactive to light  Cardiovascular:      Rate and Rhythm: Normal rate and regular rhythm  Heart sounds: Normal heart sounds  No murmur heard  Pulmonary:      Effort: Pulmonary effort is normal       Breath sounds: Normal breath sounds  No wheezing or rales  Abdominal:      General: Bowel sounds are normal  There is no distension  Palpations: Abdomen is soft  Tenderness: There is no abdominal tenderness  Musculoskeletal:      Cervical back: Normal range of motion and neck supple  Right lower leg: No edema  Left lower leg: No edema  Comments: Left knee exam-dressing present, swelling present, skin looks normal, tenderness present, movement painful and restricted due to pain   Lymphadenopathy:      Cervical: No cervical adenopathy  Skin:     General: Skin is warm  Neurological:      General: No focal deficit present  Mental Status: She is alert and oriented to person, place, and time

## 2022-11-30 ENCOUNTER — HOSPITAL ENCOUNTER (OUTPATIENT)
Dept: RADIOLOGY | Age: 62
Discharge: HOME | End: 2022-11-30

## 2022-11-30 VITALS — WEIGHT: 175.71 LBS | BODY MASS INDEX: 36.88 KG/M2 | HEIGHT: 58 IN

## 2022-11-30 DIAGNOSIS — Z12.31 ENCOUNTER FOR SCREENING MAMMOGRAM FOR MALIGNANT NEOPLASM OF BREAST: ICD-10-CM

## 2023-02-22 ENCOUNTER — TELEMEDICINE (OUTPATIENT)
Dept: INTERNAL MEDICINE CLINIC | Facility: CLINIC | Age: 63
End: 2023-02-22

## 2023-02-22 VITALS — HEIGHT: 58 IN | WEIGHT: 176 LBS | BODY MASS INDEX: 36.94 KG/M2

## 2023-02-22 DIAGNOSIS — E78.2 MIXED HYPERLIPIDEMIA: ICD-10-CM

## 2023-02-22 DIAGNOSIS — I10 ESSENTIAL HYPERTENSION: ICD-10-CM

## 2023-02-22 DIAGNOSIS — E11.65 UNCONTROLLED TYPE 2 DIABETES MELLITUS WITH HYPERGLYCEMIA (HCC): ICD-10-CM

## 2023-02-22 DIAGNOSIS — U07.1 COVID-19 VIRUS DETECTED: Primary | ICD-10-CM

## 2023-02-22 RX ORDER — LOSARTAN POTASSIUM AND HYDROCHLOROTHIAZIDE 25; 100 MG/1; MG/1
1 TABLET ORAL DAILY
Qty: 90 TABLET | Refills: 1 | Status: SHIPPED | OUTPATIENT
Start: 2023-02-22

## 2023-02-22 RX ORDER — ROSUVASTATIN CALCIUM 10 MG/1
10 TABLET, COATED ORAL DAILY
Qty: 90 TABLET | Refills: 1 | Status: SHIPPED | OUTPATIENT
Start: 2023-02-22

## 2023-02-22 RX ORDER — SITAGLIPTIN AND METFORMIN HYDROCHLORIDE 500; 50 MG/1; MG/1
1 TABLET, FILM COATED ORAL 2 TIMES DAILY WITH MEALS
Qty: 180 TABLET | Refills: 1 | Status: SHIPPED | OUTPATIENT
Start: 2023-02-22

## 2023-02-22 NOTE — PROGRESS NOTES
Virtual Regular Visit    Verification of patient location:    Patient is located in the following state in which I hold an active license [place]      Assessment/Plan:    Problem List Items Addressed This Visit        Endocrine    Uncontrolled type 2 diabetes mellitus with hyperglycemia ([address] )    Relevant Medications    dulaglutide (Trulicity) 3 BB/7 1FJ injection    Janumet  MG per tablet    Other Relevant Orders    CBC and differential    Comprehensive metabolic panel    Hemoglobin A1C    Lipid Panel with Direct LDL reflex    Microalbumin / creatinine urine ratio    TSH, 3rd generation       Cardiovascular and Mediastinum    Essential hypertension    Relevant Medications    losartan-hydrochlorothiazide (HYZAAR) 100-25 MG per tablet       Other    Hyperlipidemia    Relevant Medications    rosuvastatin (CRESTOR) 10 MG tablet    COVID-19 virus detected - Primary       BMI Counseling: Body mass index is 36 78 kg/m²  The BMI is above normal  Nutrition recommendations include decreasing portion sizes, encouraging healthy choices of fruits and vegetables and decreasing fast food intake  Exercise recommendations include moderate physical activity 150 minutes/week  Rationale for BMI follow-up plan is due to patient being overweight or obese  Reason for visit is   Chief Complaint   Patient presents with   • COVID-19     [address]  covid positive yesterday, headache, sore throat, coughing, stuffy nose, runny nose, fever last night    • Virtual Regular Visit        Encounter provider Merced Pascual MD    Provider located at [address] [place] [address] [place] [id number] [phone]      Recent Visits  No visits were found meeting these conditions    Showing recent visits within past 7 days and meeting all other requirements  Today's Visits  Date Type Provider Dept   02/22/23 Telemedicine Merced Pascual MD [facility]  74 Internal Med Showing today's visits and meeting all other requirements  Future Appointments  No visits were found meeting these conditions  Showing future appointments within next 150 days and meeting all other requirements       The patient was identified by name and date of birth  Josh Soto was informed that this is a telemedicine visit and that the visit is being conducted through the [employer]  She agrees to proceed     My office door was closed  No one else was in the room  She acknowledged consent and understanding of privacy and security of the video platform  The patient has agreed to participate and understands they can discontinue the visit at any time  Patient is aware this is a billable service  Subjective  Josh Soto is a 60 y o  female sick         Sick since Sunday, did COVID test today, comes out positive, feeling better,       Past Medical History:   Diagnosis Date   • Diabetes mellitus ([facility] )    • Hypercholesterolemia    • Hypertension    • Rheumatoid arthritis ([facility] )    • Sarcoidosis        Past Surgical History:   Procedure Laterality Date   • CHOLECYSTECTOMY     • COLONOSCOPY  05/15/2009    Abnormal    • MAMMO (HISTORICAL)  09/30/2016    Negative        Current Outpatient Medications   Medication Sig Dispense Refill   • dulaglutide (Trulicity) 3 FS/3 9AQ injection Inject 0 5 mL (3 mg total) under the skin once a week 6 mL 1   • Insulin Pen Needle 29G X 10MM MISC Use once a week 13 each 1   • Janumet  MG per tablet Take 1 tablet by mouth 2 (two) times a day with meals 180 tablet 1   • losartan-hydrochlorothiazide (HYZAAR) 100-25 MG per tablet Take 1 tablet by mouth daily 90 tablet 1   • ondansetron (ZOFRAN) 4 mg tablet take 1 tablet by mouth once daily if needed for nausea and vomiting     • rosuvastatin (CRESTOR) 10 MG tablet Take 1 tablet (10 mg total) by mouth daily 90 tablet 1   • ciprofloxacin (CIPRO) 500 mg tablet take 1 tablet by mouth twice a day for 7 days (Patient not taking: Reported on 10/21/2022)     • meloxicam (Mobic) 15 mg tablet Take 1 tablet (15 mg total) by mouth daily (Patient not taking: Reported on 10/21/2022) 30 tablet 0   • oxyCODONE (ROXICODONE) 5 immediate release tablet TAKE 1 TO 2 TABLETS BY MOUTH EVERY 6 HOURS AS NEEDED FOR POST OPERATIVE PAIN (Patient not taking: Reported on 2/22/2023)     • UltiCare Mini Pen Needles 31G X 6 MM MISC  (Patient not taking: Reported on 10/5/2022)     • verapamil (VERELAN) 240 MG 24 hr capsule Take 1 capsule (240 mg total) by mouth daily at bedtime (Patient not taking: Reported on 2/22/2023) 90 capsule 1     No current facility-administered medications for this visit  Allergies   Allergen Reactions   • Sulfa Antibiotics        Review of Systems   Constitutional: Positive for fever  Negative for chills  HENT: Positive for congestion and sore throat  Negative for ear pain  Eyes: Negative for pain  Respiratory: Positive for cough  Negative for shortness of breath  Cardiovascular: Negative for chest pain and leg swelling  Gastrointestinal: Negative for abdominal pain, nausea and vomiting  Endocrine: Negative for polyuria  Genitourinary: Negative for difficulty urinating, frequency and urgency  Musculoskeletal: Negative for arthralgias and back pain  Skin: Negative for rash  Neurological: Negative for weakness and headaches  Psychiatric/Behavioral: Negative for sleep disturbance  The patient is not nervous/anxious  Video Exam    Vitals:    02/22/23 1420   Weight: 79 8 kg (176 lb)   Height: 4' 10" (1 473 m)       Physical Exam  Eyes:      Extraocular Movements: Extraocular movements intact  Conjunctiva/sclera: Conjunctivae normal    Neurological:      Mental Status: She is alert and oriented to person, place, and time            I spent 20 minutes directly with the patient during this visit

## 2023-03-06 LAB
CREAT ?TM UR-SCNC: 128.1 UMOL/L
EXT MICROALBUMIN URINE RANDOM: 2.2
HBA1C MFR BLD HPLC: 7.5 %
MICROALBUMIN/CREAT UR: 17.2 MG/G{CREAT}

## 2023-03-15 ENCOUNTER — RA CDI HCC (OUTPATIENT)
Dept: OTHER | Facility: HOSPITAL | Age: 63
End: 2023-03-15

## 2023-03-15 NOTE — PROGRESS NOTES
[facility]  coding opportunities          Chart Reviewed number of suggestions sent to Provider: 2  [id number] [phone]  [id number]     Patients Insurance        Commercial Insurance: [employer]

## 2023-03-22 ENCOUNTER — OFFICE VISIT (OUTPATIENT)
Dept: INTERNAL MEDICINE CLINIC | Facility: CLINIC | Age: 63
End: 2023-03-22

## 2023-03-22 VITALS
DIASTOLIC BLOOD PRESSURE: 72 MMHG | BODY MASS INDEX: 37.79 KG/M2 | TEMPERATURE: 98.1 F | OXYGEN SATURATION: 96 % | HEIGHT: 58 IN | SYSTOLIC BLOOD PRESSURE: 134 MMHG | WEIGHT: 180 LBS | HEART RATE: 92 BPM

## 2023-03-22 DIAGNOSIS — E11.65 UNCONTROLLED TYPE 2 DIABETES MELLITUS WITH HYPERGLYCEMIA (HCC): Primary | ICD-10-CM

## 2023-03-22 DIAGNOSIS — I83.813 VARICOSE VEINS OF BOTH LOWER EXTREMITIES WITH PAIN: ICD-10-CM

## 2023-03-22 DIAGNOSIS — I10 ESSENTIAL HYPERTENSION: ICD-10-CM

## 2023-03-22 DIAGNOSIS — Z00.00 ANNUAL PHYSICAL EXAM: ICD-10-CM

## 2023-03-22 DIAGNOSIS — M17.0 PRIMARY OSTEOARTHRITIS OF BOTH KNEES: ICD-10-CM

## 2023-03-22 DIAGNOSIS — E78.2 MIXED HYPERLIPIDEMIA: ICD-10-CM

## 2023-03-22 RX ORDER — SITAGLIPTIN AND METFORMIN HYDROCHLORIDE 500; 50 MG/1; MG/1
1 TABLET, FILM COATED ORAL 2 TIMES DAILY WITH MEALS
Qty: 180 TABLET | Refills: 1 | Status: SHIPPED | OUTPATIENT
Start: 2023-03-22

## 2023-03-22 RX ORDER — LOSARTAN POTASSIUM AND HYDROCHLOROTHIAZIDE 25; 100 MG/1; MG/1
1 TABLET ORAL DAILY
Qty: 90 TABLET | Refills: 1 | Status: SHIPPED | OUTPATIENT
Start: 2023-03-22

## 2023-03-22 RX ORDER — ROSUVASTATIN CALCIUM 10 MG/1
10 TABLET, COATED ORAL DAILY
Qty: 90 TABLET | Refills: 1 | Status: SHIPPED | OUTPATIENT
Start: 2023-03-22

## 2023-03-22 NOTE — PROGRESS NOTES
Assessment/Plan:      Depression Screening and Follow-up Plan: Patient was screened for depression during today's encounter  They screened negative with a PHQ-2 score of 0             1  Uncontrolled type 2 diabetes mellitus with hyperglycemia (HCC)  -     dulaglutide (Trulicity) 3 BX/2 3ZM injection; Inject 0 5 mL (3 mg total) under the skin once a week  -     Insulin Pen Needle 29G X 10MM MISC; Use once a week  -     Janumet  MG per tablet; Take 1 tablet by mouth 2 (two) times a day with meals    2  Essential hypertension  -     losartan-hydrochlorothiazide (HYZAAR) 100-25 MG per tablet; Take 1 tablet by mouth daily    3  Mixed hyperlipidemia  -     rosuvastatin (CRESTOR) 10 MG tablet; Take 1 tablet (10 mg total) by mouth daily    4  Primary osteoarthritis of both knees    5  Varicose veins of both lower extremities with pain    6  Annual physical exam         Subjective:      Patient ID: Alejandra Blank is a 60 y o  female  Follow-up on blood test done on 3/6/2023 test discussed with her, also physical      The following portions of the patient's history were reviewed and updated as appropriate: She  has a past medical history of Diabetes mellitus ([address] ), Hypercholesterolemia, Hypertension, Rheumatoid arthritis ([address] ), and Sarcoidosis    She   Patient Active Problem List    Diagnosis Date Noted   • Primary osteoarthritis of left knee 11/04/2022   • Status post left knee replacement 11/04/2022   • Rotator cuff tendonitis, left 04/08/2022   • Nausea 05/18/2021   • COVID-19 virus detected 05/18/2021   • Mixed hyperlipidemia 05/18/2021   • Essential hypertension, benign 04/21/2021   • Primary osteoarthritis of both knees 04/21/2021   • Varicose veins of both lower extremities with pain 03/30/2021   • Body mass index 39 0-39 9, adult 03/30/2021   • Uncontrolled type 2 diabetes mellitus with hyperglycemia ([address] ) 03/30/2021   • Encounter for screening mammogram for malignant neoplasm of breast 03/30/2021   • Encounter for screening colonoscopy 03/30/2021   • Moderate persistent asthma without complication 81/83/2010   • Exposure to SARS-associated coronavirus 09/10/2020   • Allergic rhinitis 03/18/2020   • Asthma 03/18/2020   • Hyperlipidemia 03/18/2020   • Sarcoidosis 03/18/2020   • Thalassemia 03/18/2020   • Vitamin D deficiency 03/18/2020   • Diabetes mellitus ([address] ) 03/14/2019   • Essential hypertension 03/14/2019     She  has a past surgical history that includes Colonoscopy (05/15/2009); Cholecystectomy; Mammo (historical) (09/30/2016); and Knee surgery (Left, 10/2022)  Her family history includes Coronary artery disease in her father; Diabetes in her maternal grandmother and mother; No Known Problems in her daughter, daughter, daughter, maternal aunt, maternal aunt, maternal aunt, paternal aunt, paternal aunt, paternal grandmother, sister, sister, sister, son, son, and son; Stomach cancer in her maternal grandfather  She  reports that she has never smoked  She has never used smokeless tobacco  She reports that she does not currently use alcohol  She reports that she does not use drugs    Current Outpatient Medications   Medication Sig Dispense Refill   • dulaglutide (Trulicity) 3 DN/9 1EK injection Inject 0 5 mL (3 mg total) under the skin once a week 6 mL 1   • Insulin Pen Needle 29G X 10MM MISC Use once a week 13 each 1   • Janumet  MG per tablet Take 1 tablet by mouth 2 (two) times a day with meals 180 tablet 1   • losartan-hydrochlorothiazide (HYZAAR) 100-25 MG per tablet Take 1 tablet by mouth daily 90 tablet 1   • rosuvastatin (CRESTOR) 10 MG tablet Take 1 tablet (10 mg total) by mouth daily 90 tablet 1   • verapamil (VERELAN) 240 MG 24 hr capsule Take 1 capsule (240 mg total) by mouth daily at bedtime 90 capsule 1   • ciprofloxacin (CIPRO) 500 mg tablet take 1 tablet by mouth twice a day for 7 days (Patient not taking: Reported on 10/21/2022)     • meloxicam (Mobic) 15 mg tablet Take 1 tablet (15 mg total) by mouth daily (Patient not taking: Reported on 10/21/2022) 30 tablet 0   • ondansetron (ZOFRAN) 4 mg tablet take 1 tablet by mouth once daily if needed for nausea and vomiting (Patient not taking: Reported on 3/22/2023)     • oxyCODONE (ROXICODONE) 5 immediate release tablet TAKE 1 TO 2 TABLETS BY MOUTH EVERY 6 HOURS AS NEEDED FOR POST OPERATIVE PAIN (Patient not taking: Reported on 2/22/2023)     • UltiCare Mini Pen Needles 31G X 6 MM MISC  (Patient not taking: Reported on 10/5/2022)       No current facility-administered medications for this visit       Current Outpatient Medications on File Prior to Visit   Medication Sig   • verapamil (VERELAN) 240 MG 24 hr capsule Take 1 capsule (240 mg total) by mouth daily at bedtime   • [DISCONTINUED] dulaglutide (Trulicity) 3 ZP/2 3OU injection Inject 0 5 mL (3 mg total) under the skin once a week   • [DISCONTINUED] Insulin Pen Needle 29G X 10MM MISC Use once a week   • [DISCONTINUED] Janumet  MG per tablet Take 1 tablet by mouth 2 (two) times a day with meals   • [DISCONTINUED] losartan-hydrochlorothiazide (HYZAAR) 100-25 MG per tablet Take 1 tablet by mouth daily   • [DISCONTINUED] rosuvastatin (CRESTOR) 10 MG tablet Take 1 tablet (10 mg total) by mouth daily   • ciprofloxacin (CIPRO) 500 mg tablet take 1 tablet by mouth twice a day for 7 days (Patient not taking: Reported on 10/21/2022)   • meloxicam (Mobic) 15 mg tablet Take 1 tablet (15 mg total) by mouth daily (Patient not taking: Reported on 10/21/2022)   • ondansetron (ZOFRAN) 4 mg tablet take 1 tablet by mouth once daily if needed for nausea and vomiting (Patient not taking: Reported on 3/22/2023)   • oxyCODONE (ROXICODONE) 5 immediate release tablet TAKE 1 TO 2 TABLETS BY MOUTH EVERY 6 HOURS AS NEEDED FOR POST OPERATIVE PAIN (Patient not taking: Reported on 2/22/2023)   • UltiCare Mini Pen Needles 31G X 6 MM MISC  (Patient not taking: Reported on 10/5/2022)     No current facility-administered medications on file prior to visit  She is allergic to sulfa antibiotics       Review of Systems   Constitutional: Negative for chills and fever  HENT: Negative for congestion, ear pain and sore throat  Eyes: Negative for pain  Respiratory: Negative for cough and shortness of breath  Cardiovascular: Negative for chest pain and leg swelling  Gastrointestinal: Negative for abdominal pain, nausea and vomiting  Endocrine: Negative for polyuria  Genitourinary: Negative for difficulty urinating, frequency and urgency  Musculoskeletal: Positive for arthralgias and back pain  Skin: Negative for rash  Neurological: Negative for weakness and headaches  Psychiatric/Behavioral: Negative for sleep disturbance  The patient is not nervous/anxious  Objective:      /72 (BP Location: Left arm, Patient Position: Sitting, Cuff Size: Large)   Pulse 92   Temp 98 1 °F (36 7 °C) (Temporal)   Ht 4' 10" (1 473 m)   Wt 81 6 kg (180 lb)   SpO2 96%   BMI 37 62 kg/m²     Recent Results (from the past 1344 hour(s))   Hemoglobin A1C    Collection Time: 03/06/23 12:00 AM   Result Value Ref Range    Hemoglobin A1C 7 5    Microalbumin / creatinine urine ratio    Collection Time: 03/06/23 12:00 AM   Result Value Ref Range    EXT Creatinine Urine 128 1     Microalbum  ,U,Random 2 2     EXTERNAL Microalb/Creat Ratio 17 2    HEMOGLOBIN A1C    Collection Time: 03/06/23  9:34 AM   Result Value Ref Range    Hemoglobin A1C 7 5 (H) <5 7 %    eAG, EST AVG Glucose 169 mg/dL mg/dL        Physical Exam  Constitutional:       Appearance: Normal appearance  HENT:      Head: Normocephalic  Right Ear: Tympanic membrane, ear canal and external ear normal       Left Ear: Tympanic membrane, ear canal and external ear normal       Nose: Nose normal  No congestion  Mouth/Throat:      Mouth: Mucous membranes are moist       Pharynx: Oropharynx is clear  No oropharyngeal exudate or posterior oropharyngeal erythema     Eyes: Extraocular Movements: Extraocular movements intact  Conjunctiva/sclera: Conjunctivae normal       Pupils: Pupils are equal, round, and reactive to light  Cardiovascular:      Rate and Rhythm: Normal rate and regular rhythm  Heart sounds: Normal heart sounds  No murmur heard  Pulmonary:      Effort: Pulmonary effort is normal       Breath sounds: Normal breath sounds  No wheezing or rales  Abdominal:      General: Bowel sounds are normal  There is no distension  Palpations: Abdomen is soft  Tenderness: There is no abdominal tenderness  Musculoskeletal:         General: Normal range of motion  Cervical back: Normal range of motion and neck supple  Right lower leg: Edema present  Left lower leg: No edema  Lymphadenopathy:      Cervical: No cervical adenopathy  Skin:     General: Skin is warm  Neurological:      General: No focal deficit present  Mental Status: She is alert and oriented to person, place, and time

## 2023-03-22 NOTE — PROGRESS NOTES
Diabetic Foot Exam    Patient's shoes and socks removed  Right Foot/Ankle   Right Foot Inspection  Skin Exam: skin normal, skin intact, callus and callus  No dry skin, no warmth, no erythema, no maceration, no abnormal color, no pre-ulcer and no ulcer  Toe Exam: ROM and strength within normal limits, swelling and tenderness  Erythema    Sensory   Monofilament testing: intact    Vascular  Capillary refills: < 3 seconds  The right DP pulse is 2+  The right PT pulse is 2+  Left Foot/Ankle  Left Foot Inspection  Skin Exam: skin normal, skin intact and callus  No dry skin, no warmth, no erythema, no maceration, normal color, no pre-ulcer and no ulcer  Toe Exam: ROM and strength within normal limits  No swelling and no erythema  Sensory   Monofilament testing: intact    Vascular  Capillary refills: < 3 seconds  The left DP pulse is 2+  The left PT pulse is 2+       Assign Risk Category  No deformity present  No loss of protective sensation  No weak pulses  Risk: 0

## 2023-03-22 NOTE — PROGRESS NOTES
[facility] INTERNAL MEDICINE    NAME: Chintan Montelongo  AGE: 60 y o  SEX: female  : 1960     DATE: 3/22/2023     Assessment and Plan:     Problem List Items Addressed This Visit        Endocrine    Uncontrolled type 2 diabetes mellitus with hyperglycemia ([address] ) - Primary    Relevant Medications    dulaglutide (Trulicity) 3 GP/1 1ZW injection    Insulin Pen Needle 29G X 10MM MISC    Janumet  MG per tablet       Cardiovascular and Mediastinum    Essential hypertension    Relevant Medications    losartan-hydrochlorothiazide (HYZAAR) 100-25 MG per tablet    Varicose veins of both lower extremities with pain       Musculoskeletal and Integument    Primary osteoarthritis of both knees       Other    Hyperlipidemia    Relevant Medications    rosuvastatin (CRESTOR) 10 MG tablet   Other Visit Diagnoses     Annual physical exam              Immunizations and preventive care screenings were discussed with patient today  Appropriate education was printed on patient's after visit summary  Counseling:  Exercise: the importance of regular exercise/physical activity was discussed  Recommend exercise 3-5 times per week for at least 30 minutes  Depression Screening and Follow-up Plan: Patient was screened for depression during today's encounter  They screened negative with a PHQ-2 score of 0  Return in about 20 weeks (around 2023)  Chief Complaint:     Chief Complaint   Patient presents with   • Follow-up     4 week follow up blood work   • hm     Labs done 3/6/23, DM foot exam      History of Present Illness:     Adult Annual Physical   Patient here for a comprehensive physical exam  The patient reports problems - left knee pain  Diet and Physical Activity  Diet/Nutrition: diabetic diet  Exercise: moderate cardiovascular exercise        Depression Screening  PHQ-2/9 Depression Screening    Little interest or pleasure in doing things: 0 - not at all  Feeling down, depressed, or hopeless: 0 - not at all  PHQ-2 Score: 0  PHQ-2 Interpretation: Negative depression screen       General Health  Sleep: sleeps well  Hearing: normal - bilateral   Vision: no vision problems  Dental: no dental visits for >1 year  /GYN Health  Patient is: postmenopausal  Last menstrual period: -  Contraceptive method: -  Review of Systems:     Review of Systems   Constitutional: Negative for chills and fever  HENT: Negative for congestion, ear pain and sore throat  Eyes: Negative for pain  Respiratory: Negative for cough and shortness of breath  Cardiovascular: Negative for chest pain and leg swelling  Gastrointestinal: Negative for abdominal pain, nausea and vomiting  Endocrine: Negative for polyuria  Genitourinary: Negative for difficulty urinating, frequency and urgency  Musculoskeletal: Positive for arthralgias and back pain  Skin: Negative for rash  Neurological: Negative for weakness and headaches  Psychiatric/Behavioral: Negative for sleep disturbance  The patient is not nervous/anxious         Past Medical History:     Past Medical History:   Diagnosis Date   • Diabetes mellitus ([facility] )    • Hypercholesterolemia    • Hypertension    • Rheumatoid arthritis ([facility] )    • Sarcoidosis       Past Surgical History:     Past Surgical History:   Procedure Laterality Date   • CHOLECYSTECTOMY     • COLONOSCOPY  05/15/2009    Abnormal    • KNEE SURGERY Left 10/2022   • MAMMO (HISTORICAL)  09/30/2016    Negative       Social History:     Social History     Socioeconomic History   • Marital status: /Civil Union     Spouse name: None   • Number of children: None   • Years of education: None   • Highest education level: None   Occupational History   • None   Tobacco Use   • Smoking status: Never   • Smokeless tobacco: Never   Vaping Use   • Vaping Use: Never used   Substance and Sexual Activity   • Alcohol use: Not Currently   • Drug use: Never   • Sexual activity: Yes     Partners: Male   Other Topics Concern   • None   Social History Narrative    Family: 3 brothers, 3 sisters, 3 sons, 3 daughters    Caffeine: Yes     Social Determinants of Health     Financial Resource Strain: Not on file   Food Insecurity: Not on file   Transportation Needs: Not on file   Physical Activity: Not on file   Stress: Not on file   Social Connections: Not on file   Intimate Partner Violence: Not on file   Housing Stability: Not on file      Family History:     Family History   Problem Relation Age of Onset   • Diabetes Mother    • Coronary artery disease Father    • No Known Problems Sister    • No Known Problems Sister    • No Known Problems Sister    • No Known Problems Daughter    • No Known Problems Daughter    • No Known Problems Daughter    • Diabetes Maternal Grandmother    • Stomach cancer Maternal Grandfather    • No Known Problems Paternal Grandmother    • No Known Problems Son    • No Known Problems Son    • No Known Problems Son    • No Known Problems Maternal Aunt    • No Known Problems Maternal Aunt    • No Known Problems Maternal Aunt    • No Known Problems Paternal Aunt    • No Known Problems Paternal Aunt       Current Medications:     Current Outpatient Medications   Medication Sig Dispense Refill   • dulaglutide (Trulicity) 3 MX/5 2BI injection Inject 0 5 mL (3 mg total) under the skin once a week 6 mL 1   • Insulin Pen Needle 29G X 10MM MISC Use once a week 13 each 1   • Janumet  MG per tablet Take 1 tablet by mouth 2 (two) times a day with meals 180 tablet 1   • losartan-hydrochlorothiazide (HYZAAR) 100-25 MG per tablet Take 1 tablet by mouth daily 90 tablet 1   • rosuvastatin (CRESTOR) 10 MG tablet Take 1 tablet (10 mg total) by mouth daily 90 tablet 1   • verapamil (VERELAN) 240 MG 24 hr capsule Take 1 capsule (240 mg total) by mouth daily at bedtime 90 capsule 1   • ciprofloxacin (CIPRO) 500 mg tablet take 1 tablet by mouth twice a day for 7 days (Patient not taking: Reported on 10/21/2022)     • meloxicam (Mobic) 15 mg tablet Take 1 tablet (15 mg total) by mouth daily (Patient not taking: Reported on 10/21/2022) 30 tablet 0   • ondansetron (ZOFRAN) 4 mg tablet take 1 tablet by mouth once daily if needed for nausea and vomiting (Patient not taking: Reported on 3/22/2023)     • oxyCODONE (ROXICODONE) 5 immediate release tablet TAKE 1 TO 2 TABLETS BY MOUTH EVERY 6 HOURS AS NEEDED FOR POST OPERATIVE PAIN (Patient not taking: Reported on 2/22/2023)     • UltiCare Mini Pen Needles 31G X 6 MM MISC  (Patient not taking: Reported on 10/5/2022)       No current facility-administered medications for this visit  Allergies: Allergies   Allergen Reactions   • Sulfa Antibiotics       Physical Exam:     /72 (BP Location: Left arm, Patient Position: Sitting, Cuff Size: Large)   Pulse 92   Temp 98 1 °F (36 7 °C) (Temporal)   Ht 4' 10" (1 473 m)   Wt 81 6 kg (180 lb)   SpO2 96%   BMI 37 62 kg/m²     Physical Exam  Vitals and nursing note reviewed  Constitutional:       General: She is not in acute distress  Appearance: She is well-developed  HENT:      Head: Normocephalic and atraumatic  Eyes:      Conjunctiva/sclera: Conjunctivae normal    Cardiovascular:      Rate and Rhythm: Normal rate and regular rhythm  Heart sounds: No murmur heard  Pulmonary:      Effort: Pulmonary effort is normal  No respiratory distress  Breath sounds: Normal breath sounds  Abdominal:      Palpations: Abdomen is soft  Tenderness: There is no abdominal tenderness  Musculoskeletal:         General: No swelling  Cervical back: Neck supple  Skin:     General: Skin is warm and dry  Capillary Refill: Capillary refill takes less than 2 seconds  Neurological:      Mental Status: She is alert     Psychiatric:         Mood and Affect: Mood normal           Fausto Jimenez MD  [facility] INTERNAL MEDICINE

## 2023-05-02 LAB
LEFT EYE DIABETIC RETINOPATHY: NORMAL
RIGHT EYE DIABETIC RETINOPATHY: NORMAL

## 2023-05-02 PROCEDURE — 2023F DILAT RTA XM W/O RTNOPTHY: CPT | Performed by: INTERNAL MEDICINE

## 2023-05-10 ENCOUNTER — OFFICE VISIT (OUTPATIENT)
Dept: INTERNAL MEDICINE CLINIC | Facility: CLINIC | Age: 63
End: 2023-05-10

## 2023-05-10 VITALS
TEMPERATURE: 97.3 F | BODY MASS INDEX: 36.94 KG/M2 | WEIGHT: 176 LBS | HEIGHT: 58 IN | DIASTOLIC BLOOD PRESSURE: 84 MMHG | OXYGEN SATURATION: 99 % | HEART RATE: 73 BPM | SYSTOLIC BLOOD PRESSURE: 134 MMHG

## 2023-05-10 DIAGNOSIS — R22.2 SUPRACLAVICULAR MASS: ICD-10-CM

## 2023-05-10 DIAGNOSIS — E11.65 UNCONTROLLED TYPE 2 DIABETES MELLITUS WITH HYPERGLYCEMIA (HCC): ICD-10-CM

## 2023-05-10 DIAGNOSIS — R22.1 NECK SWELLING: Primary | ICD-10-CM

## 2023-05-10 DIAGNOSIS — I10 ESSENTIAL HYPERTENSION, BENIGN: ICD-10-CM

## 2023-05-10 NOTE — PROGRESS NOTES
Assessment/Plan:      Depression Screening and Follow-up Plan: Patient was screened for depression during today's encounter  They screened negative with a PHQ-2 score of 0             1  Neck swelling    2  Supraclavicular mass    3  Uncontrolled type 2 diabetes mellitus with hyperglycemia ([address] )    4  Essential hypertension, benign           Subjective:      Patient ID: Dariel Brambila is a 58 y o  female  Family noticed lump on the left side of the neck, as per patient is not hurting, sugars at home doing well    Diabetes  Pertinent negatives for hypoglycemia include no headaches or nervousness/anxiousness  Pertinent negatives for diabetes include no chest pain, no polyuria and no weakness  The following portions of the patient's history were reviewed and updated as appropriate: She  has a past medical history of Diabetes mellitus ([address] ), Hypercholesterolemia, Hypertension, Rheumatoid arthritis ([address] ), and Sarcoidosis    She   Patient Active Problem List    Diagnosis Date Noted   • Supraclavicular mass 05/10/2023   • Neck swelling 05/10/2023   • Primary osteoarthritis of left knee 11/04/2022   • Status post left knee replacement 11/04/2022   • Rotator cuff tendonitis, left 04/08/2022   • Nausea 05/18/2021   • COVID-19 virus detected 05/18/2021   • Mixed hyperlipidemia 05/18/2021   • Essential hypertension, benign 04/21/2021   • Primary osteoarthritis of both knees 04/21/2021   • Varicose veins of both lower extremities with pain 03/30/2021   • Body mass index 39 0-39 9, adult 03/30/2021   • Uncontrolled type 2 diabetes mellitus with hyperglycemia ([address] ) 03/30/2021   • Encounter for screening mammogram for malignant neoplasm of breast 03/30/2021   • Encounter for screening colonoscopy 03/30/2021   • Moderate persistent asthma without complication 95/49/1520   • Exposure to SARS-associated coronavirus 09/10/2020   • Allergic rhinitis 03/18/2020   • Asthma 03/18/2020   • Hyperlipidemia 03/18/2020   • Sarcoidosis 03/18/2020   • Thalassemia 03/18/2020   • Vitamin D deficiency 03/18/2020   • Diabetes mellitus ([facility] ) 03/14/2019   • Essential hypertension 03/14/2019     She  has a past surgical history that includes Colonoscopy (05/15/2009); Cholecystectomy; Mammo (historical) (09/30/2016); and Knee surgery (Left, 10/2022)  Her family history includes Coronary artery disease in her father; Diabetes in her maternal grandmother and mother; No Known Problems in her daughter, daughter, daughter, maternal aunt, maternal aunt, maternal aunt, paternal aunt, paternal aunt, paternal grandmother, sister, sister, sister, son, son, and son; Stomach cancer in her maternal grandfather  She  reports that she has never smoked  She has never used smokeless tobacco  She reports that she does not currently use alcohol  She reports that she does not use drugs  Current Outpatient Medications   Medication Sig Dispense Refill   • dulaglutide (Trulicity) 3 SG/0 3XG injection Inject 0 5 mL (3 mg total) under the skin once a week 6 mL 1   • Insulin Pen Needle 29G X 10MM MISC Use once a week 13 each 1   • Janumet  MG per tablet Take 1 tablet by mouth 2 (two) times a day with meals 180 tablet 1   • losartan-hydrochlorothiazide (HYZAAR) 100-25 MG per tablet Take 1 tablet by mouth daily 90 tablet 1   • rosuvastatin (CRESTOR) 10 MG tablet Take 1 tablet (10 mg total) by mouth daily 90 tablet 1   • verapamil (VERELAN) 240 MG 24 hr capsule Take 1 capsule (240 mg total) by mouth daily at bedtime 90 capsule 1   • meloxicam (Mobic) 15 mg tablet Take 1 tablet (15 mg total) by mouth daily (Patient not taking: Reported on 10/21/2022) 30 tablet 0   • ondansetron (ZOFRAN) 4 mg tablet take 1 tablet by mouth once daily if needed for nausea and vomiting (Patient not taking: Reported on 3/22/2023)     • UltiCare Mini Pen Needles 31G X 6 MM MISC  (Patient not taking: Reported on 10/5/2022)       No current facility-administered medications for this visit       Current Outpatient Medications on File Prior to Visit   Medication Sig   • dulaglutide (Trulicity) 3 ST/8 7LL injection Inject 0 5 mL (3 mg total) under the skin once a week   • Insulin Pen Needle 29G X 10MM MISC Use once a week   • Janumet  MG per tablet Take 1 tablet by mouth 2 (two) times a day with meals   • losartan-hydrochlorothiazide (HYZAAR) 100-25 MG per tablet Take 1 tablet by mouth daily   • rosuvastatin (CRESTOR) 10 MG tablet Take 1 tablet (10 mg total) by mouth daily   • verapamil (VERELAN) 240 MG 24 hr capsule Take 1 capsule (240 mg total) by mouth daily at bedtime   • meloxicam (Mobic) 15 mg tablet Take 1 tablet (15 mg total) by mouth daily (Patient not taking: Reported on 10/21/2022)   • ondansetron (ZOFRAN) 4 mg tablet take 1 tablet by mouth once daily if needed for nausea and vomiting (Patient not taking: Reported on 3/22/2023)   • UltiCare Mini Pen Needles 31G X 6 MM MISC  (Patient not taking: Reported on 10/5/2022)   • [DISCONTINUED] ciprofloxacin (CIPRO) 500 mg tablet take 1 tablet by mouth twice a day for 7 days (Patient not taking: Reported on 10/21/2022)   • [DISCONTINUED] oxyCODONE (ROXICODONE) 5 immediate release tablet TAKE 1 TO 2 TABLETS BY MOUTH EVERY 6 HOURS AS NEEDED FOR POST OPERATIVE PAIN (Patient not taking: Reported on 2/22/2023)     No current facility-administered medications on file prior to visit  She is allergic to sulfa antibiotics       Review of Systems   Constitutional: Negative for chills and fever  HENT: Negative for congestion, ear pain and sore throat  Eyes: Negative for pain  Respiratory: Negative for cough and shortness of breath  Cardiovascular: Negative for chest pain and leg swelling  Gastrointestinal: Negative for abdominal pain, nausea and vomiting  Endocrine: Negative for polyuria  Genitourinary: Negative for difficulty urinating, frequency and urgency  Musculoskeletal: Negative for arthralgias and back pain  Skin: Negative for rash  "  Neurological: Negative for weakness and headaches  Psychiatric/Behavioral: Negative for sleep disturbance  The patient is not nervous/anxious  Objective:      /84 (BP Location: Left arm, Patient Position: Sitting, Cuff Size: Adult)   Pulse 73   Temp (!) 97 3 °F (36 3 °C) (Temporal)   Ht 4' 10\" (1 473 m)   Wt 79 8 kg (176 lb)   SpO2 99%   BMI 36 78 kg/m²     Recent Results (from the past 1344 hour(s))    Diabetes Eye Exam    Collection Time: 05/02/23  2:29 PM   Result Value Ref Range    Right Eye Diabetic Retinopathy None     Left Eye Diabetic Retinopathy None         Physical Exam  Constitutional:       Appearance: Normal appearance  HENT:      Head: Normocephalic  Right Ear: External ear normal       Left Ear: External ear normal       Nose: Nose normal  No congestion  Mouth/Throat:      Mouth: Mucous membranes are moist       Pharynx: Oropharynx is clear  No oropharyngeal exudate or posterior oropharyngeal erythema  Eyes:      Extraocular Movements: Extraocular movements intact  Conjunctiva/sclera: Conjunctivae normal    Cardiovascular:      Rate and Rhythm: Normal rate and regular rhythm  Heart sounds: Normal heart sounds  No murmur heard  Pulmonary:      Effort: Pulmonary effort is normal       Breath sounds: Normal breath sounds  No wheezing or rales  Abdominal:      General: Bowel sounds are normal  There is no distension  Palpations: Abdomen is soft  Tenderness: There is no abdominal tenderness  Musculoskeletal:      Cervical back: Normal range of motion and neck supple  Right lower leg: No edema  Left lower leg: No edema  Comments: Bilateral supraclavicular area fullness present, left side more than right, skin looks normal, no impulse on coughing, swelling is soft   Lymphadenopathy:      Cervical: No cervical adenopathy  Skin:     General: Skin is warm  Neurological:      General: No focal deficit present        Mental " Status: She is alert and oriented to person, place, and time

## 2023-05-19 ENCOUNTER — HOSPITAL ENCOUNTER (OUTPATIENT)
Dept: RADIOLOGY | Facility: HOSPITAL | Age: 63
Discharge: HOME | End: 2023-05-19
Attending: INTERNAL MEDICINE

## 2023-05-19 DIAGNOSIS — R22.2 SUPRACLAVICULAR MASS: ICD-10-CM

## 2023-05-19 DIAGNOSIS — R22.1 NECK SWELLING: ICD-10-CM

## 2023-08-22 ENCOUNTER — OFFICE VISIT (OUTPATIENT)
Dept: INTERNAL MEDICINE CLINIC | Facility: CLINIC | Age: 63
End: 2023-08-22
Payer: COMMERCIAL

## 2023-08-22 VITALS
HEART RATE: 89 BPM | SYSTOLIC BLOOD PRESSURE: 132 MMHG | OXYGEN SATURATION: 97 % | WEIGHT: 170 LBS | TEMPERATURE: 97.5 F | DIASTOLIC BLOOD PRESSURE: 82 MMHG | BODY MASS INDEX: 35.68 KG/M2 | HEIGHT: 58 IN

## 2023-08-22 DIAGNOSIS — E11.65 UNCONTROLLED TYPE 2 DIABETES MELLITUS WITH HYPERGLYCEMIA (HCC): Primary | ICD-10-CM

## 2023-08-22 DIAGNOSIS — I83.813 VARICOSE VEINS OF BOTH LOWER EXTREMITIES WITH PAIN: ICD-10-CM

## 2023-08-22 DIAGNOSIS — M17.0 PRIMARY OSTEOARTHRITIS OF BOTH KNEES: ICD-10-CM

## 2023-08-22 DIAGNOSIS — E78.2 MIXED HYPERLIPIDEMIA: ICD-10-CM

## 2023-08-22 DIAGNOSIS — I10 ESSENTIAL HYPERTENSION, BENIGN: ICD-10-CM

## 2023-08-22 DIAGNOSIS — M75.82 ROTATOR CUFF TENDONITIS, LEFT: ICD-10-CM

## 2023-08-22 PROCEDURE — 99214 OFFICE O/P EST MOD 30 MIN: CPT | Performed by: INTERNAL MEDICINE

## 2023-11-08 ENCOUNTER — VBI (OUTPATIENT)
Dept: ADMINISTRATIVE | Facility: OTHER | Age: 63
End: 2023-11-08

## 2023-12-06 ENCOUNTER — HOSPITAL ENCOUNTER (OUTPATIENT)
Dept: RADIOLOGY | Age: 63
Discharge: HOME | End: 2023-12-06
Payer: COMMERCIAL

## 2023-12-06 VITALS — HEIGHT: 58 IN | WEIGHT: 169.97 LBS | BODY MASS INDEX: 35.68 KG/M2

## 2023-12-06 DIAGNOSIS — Z12.31 VISIT FOR SCREENING MAMMOGRAM: ICD-10-CM

## 2023-12-06 PROCEDURE — 77063 BREAST TOMOSYNTHESIS BI: CPT

## 2023-12-06 PROCEDURE — 77067 SCR MAMMO BI INCL CAD: CPT

## 2023-12-11 ENCOUNTER — OFFICE VISIT (OUTPATIENT)
Dept: INTERNAL MEDICINE CLINIC | Facility: CLINIC | Age: 63
End: 2023-12-11
Payer: COMMERCIAL

## 2023-12-11 VITALS
DIASTOLIC BLOOD PRESSURE: 80 MMHG | HEIGHT: 58 IN | OXYGEN SATURATION: 97 % | HEART RATE: 97 BPM | WEIGHT: 173 LBS | TEMPERATURE: 98.5 F | BODY MASS INDEX: 36.31 KG/M2 | SYSTOLIC BLOOD PRESSURE: 124 MMHG

## 2023-12-11 DIAGNOSIS — E53.8 B12 DEFICIENCY: ICD-10-CM

## 2023-12-11 DIAGNOSIS — E11.65 UNCONTROLLED TYPE 2 DIABETES MELLITUS WITH HYPERGLYCEMIA (HCC): Primary | ICD-10-CM

## 2023-12-11 DIAGNOSIS — B49 FUNGAL INFECTION: ICD-10-CM

## 2023-12-11 DIAGNOSIS — E78.2 MIXED HYPERLIPIDEMIA: ICD-10-CM

## 2023-12-11 DIAGNOSIS — I10 ESSENTIAL HYPERTENSION: ICD-10-CM

## 2023-12-11 DIAGNOSIS — M17.0 PRIMARY OSTEOARTHRITIS OF BOTH KNEES: ICD-10-CM

## 2023-12-11 DIAGNOSIS — M75.82 ROTATOR CUFF TENDONITIS, LEFT: ICD-10-CM

## 2023-12-11 DIAGNOSIS — I83.813 VARICOSE VEINS OF BOTH LOWER EXTREMITIES WITH PAIN: ICD-10-CM

## 2023-12-11 PROCEDURE — 99214 OFFICE O/P EST MOD 30 MIN: CPT | Performed by: INTERNAL MEDICINE

## 2023-12-11 RX ORDER — CLOTRIMAZOLE 1 %
CREAM (GRAM) TOPICAL 2 TIMES DAILY
Qty: 60 G | Refills: 1 | Status: SHIPPED | OUTPATIENT
Start: 2023-12-11

## 2023-12-11 RX ORDER — LOSARTAN POTASSIUM AND HYDROCHLOROTHIAZIDE 25; 100 MG/1; MG/1
1 TABLET ORAL DAILY
Qty: 90 TABLET | Refills: 1 | Status: SHIPPED | OUTPATIENT
Start: 2023-12-11

## 2023-12-11 RX ORDER — ROSUVASTATIN CALCIUM 10 MG/1
10 TABLET, COATED ORAL DAILY
Qty: 90 TABLET | Refills: 1 | Status: SHIPPED | OUTPATIENT
Start: 2023-12-11

## 2023-12-11 RX ORDER — SITAGLIPTIN AND METFORMIN HYDROCHLORIDE 500; 50 MG/1; MG/1
1 TABLET, FILM COATED ORAL 2 TIMES DAILY WITH MEALS
Qty: 180 TABLET | Refills: 1 | Status: SHIPPED | OUTPATIENT
Start: 2023-12-11 | End: 2023-12-12 | Stop reason: SDUPTHER

## 2023-12-11 NOTE — PROGRESS NOTES
Assessment/Plan:    BMI Counseling: Body mass index is 36.16 kg/m². The BMI is above normal. Nutrition recommendations include decreasing portion sizes, encouraging healthy choices of fruits and vegetables and decreasing fast food intake. Exercise recommendations include moderate physical activity 150 minutes/week. Rationale for BMI follow-up plan is due to patient being overweight or obese. 1. Uncontrolled type 2 diabetes mellitus with hyperglycemia (HCC)  -     dulaglutide (Trulicity) 3 HORN/5.3EI injection; Inject 0.5 mL (3 mg total) under the skin once a week  -     Janumet  MG per tablet; Take 1 tablet by mouth 2 (two) times a day with meals  -     CBC and differential; Future  -     Comprehensive metabolic panel; Future  -     Lipid Panel with Direct LDL reflex; Future  -     Albumin / creatinine urine ratio  -     TSH, 3rd generation; Future  -     Hemoglobin A1C; Future    2. Essential hypertension  -     losartan-hydrochlorothiazide (HYZAAR) 100-25 MG per tablet; Take 1 tablet by mouth daily    3. Mixed hyperlipidemia  -     rosuvastatin (CRESTOR) 10 MG tablet; Take 1 tablet (10 mg total) by mouth daily    4. Rotator cuff tendonitis, left    5. Varicose veins of both lower extremities with pain    6. Primary osteoarthritis of both knees    7. B12 deficiency  -     Vitamin B12; Future    8. Fungal infection  -     clotrimazole (LOTRIMIN) 1 % cream; Apply topically 2 (two) times a day           Subjective:      Patient ID: Leeroy Monterroso is a 58 y.o. female. Follow-up on blood test done on 12/8/2023 test discussed with her        The following portions of the patient's history were reviewed and updated as appropriate: She  has a past medical history of Diabetes mellitus ([address]), Hypercholesterolemia, Hypertension, Rheumatoid arthritis ([address]), and Sarcoidosis.   She   Patient Active Problem List    Diagnosis Date Noted    Supraclavicular mass 05/10/2023    Neck swelling 05/10/2023    Primary osteoarthritis of left knee 11/04/2022    Status post left knee replacement 11/04/2022    Rotator cuff tendonitis, left 04/08/2022    Nausea 05/18/2021    COVID-19 virus detected 05/18/2021    Mixed hyperlipidemia 05/18/2021    Essential hypertension, benign 04/21/2021    Primary osteoarthritis of both knees 04/21/2021    Varicose veins of both lower extremities with pain 03/30/2021    Body mass index 39.0-39.9, adult 03/30/2021    Uncontrolled type 2 diabetes mellitus with hyperglycemia ([address]) 03/30/2021    Encounter for screening mammogram for malignant neoplasm of breast 03/30/2021    Encounter for screening colonoscopy 03/30/2021    Moderate persistent asthma without complication 37/26/4930    Exposure to SARS-associated coronavirus 09/10/2020    Allergic rhinitis 03/18/2020    Asthma 03/18/2020    Hyperlipidemia 03/18/2020    Sarcoidosis 03/18/2020    Thalassemia 03/18/2020    Vitamin D deficiency 03/18/2020    Diabetes mellitus ([address]) 03/14/2019    Essential hypertension 03/14/2019     She  has a past surgical history that includes Colonoscopy (05/15/2009); Cholecystectomy; Mammo (historical) (09/30/2016); and Knee surgery (Left, 10/2022). Her family history includes Coronary artery disease in her father; Diabetes in her maternal grandmother and mother; No Known Problems in her daughter, daughter, daughter, maternal aunt, maternal aunt, maternal aunt, paternal aunt, paternal aunt, paternal grandmother, sister, sister, sister, son, son, and son; Stomach cancer in her maternal grandfather. She  reports that she has never smoked. She has never used smokeless tobacco. She reports that she does not currently use alcohol. She reports that she does not use drugs.   Current Outpatient Medications   Medication Sig Dispense Refill    clotrimazole (LOTRIMIN) 1 % cream Apply topically 2 (two) times a day 60 g 1    dulaglutide (Trulicity) 3 FW/9.1JZ injection Inject 0.5 mL (3 mg total) under the skin once a week 6 mL 1 Insulin Pen Needle 29G X 10MM MISC Use once a week 13 each 1    Janumet  MG per tablet Take 1 tablet by mouth 2 (two) times a day with meals 180 tablet 1    losartan-hydrochlorothiazide (HYZAAR) 100-25 MG per tablet Take 1 tablet by mouth daily 90 tablet 1    rosuvastatin (CRESTOR) 10 MG tablet Take 1 tablet (10 mg total) by mouth daily 90 tablet 1    UltiCare Mini Pen Needles 31G X 6 MM MISC       meloxicam (Mobic) 15 mg tablet Take 1 tablet (15 mg total) by mouth daily (Patient not taking: Reported on 10/21/2022) 30 tablet 0    ondansetron (ZOFRAN) 4 mg tablet take 1 tablet by mouth once daily if needed for nausea and vomiting (Patient not taking: Reported on 3/22/2023)      verapamil (VERELAN) 240 MG 24 hr capsule Take 1 capsule (240 mg total) by mouth daily at bedtime (Patient not taking: Reported on 12/11/2023) 90 capsule 1     No current facility-administered medications for this visit.      Current Outpatient Medications on File Prior to Visit   Medication Sig    Insulin Pen Needle 29G X 10MM MISC Use once a week    UltiCare Mini Pen Needles 31G X 6 MM MISC     [DISCONTINUED] dulaglutide (Trulicity) 3 TD/0.0RZ injection Inject 0.5 mL (3 mg total) under the skin once a week    [DISCONTINUED] Janumet  MG per tablet Take 1 tablet by mouth 2 (two) times a day with meals    [DISCONTINUED] losartan-hydrochlorothiazide (HYZAAR) 100-25 MG per tablet Take 1 tablet by mouth daily    [DISCONTINUED] rosuvastatin (CRESTOR) 10 MG tablet Take 1 tablet (10 mg total) by mouth daily    meloxicam (Mobic) 15 mg tablet Take 1 tablet (15 mg total) by mouth daily (Patient not taking: Reported on 10/21/2022)    ondansetron (ZOFRAN) 4 mg tablet take 1 tablet by mouth once daily if needed for nausea and vomiting (Patient not taking: Reported on 3/22/2023)    verapamil (VERELAN) 240 MG 24 hr capsule Take 1 capsule (240 mg total) by mouth daily at bedtime (Patient not taking: Reported on 12/11/2023)     No current facility-administered medications on file prior to visit. She is allergic to sulfa antibiotics. .    Review of Systems   Constitutional:  Negative for chills and fever. HENT:  Negative for congestion, ear pain and sore throat. Eyes:  Negative for pain. Respiratory:  Negative for cough and shortness of breath. Cardiovascular:  Negative for chest pain and leg swelling. Gastrointestinal:  Negative for abdominal pain, nausea and vomiting. Endocrine: Negative for polyuria. Genitourinary:  Negative for difficulty urinating, frequency and urgency. Musculoskeletal:  Positive for arthralgias. Negative for back pain. Skin:  Negative for rash. Neurological:  Negative for weakness and headaches. Psychiatric/Behavioral:  Negative for sleep disturbance. The patient is not nervous/anxious. Objective:      /80 (BP Location: Left arm, Patient Position: Sitting, Cuff Size: Standard)   Pulse 97   Temp 98.5 °F (36.9 °C) (Temporal)   Ht 4' 10" (1.473 m)   Wt 78.5 kg (173 lb)   SpO2 97%   BMI 36.16 kg/m²     Recent Results (from the past 1344 hour(s))   HEMOGLOBIN A1C    Collection Time: 12/08/23  9:04 AM   Result Value Ref Range    Hemoglobin A1C 6.8 (H) <5.7 %    eAG, EST AVG Glucose 148 mg/dL        Physical Exam  Constitutional:       Appearance: Normal appearance. HENT:      Head: Normocephalic. Right Ear: External ear normal.      Left Ear: External ear normal.      Nose: Nose normal. No congestion. Mouth/Throat:      Mouth: Mucous membranes are moist.      Pharynx: Oropharynx is clear. No oropharyngeal exudate or posterior oropharyngeal erythema. Eyes:      Extraocular Movements: Extraocular movements intact. Conjunctiva/sclera: Conjunctivae normal.   Cardiovascular:      Rate and Rhythm: Normal rate and regular rhythm. Heart sounds: Normal heart sounds. No murmur heard.   Pulmonary:      Effort: Pulmonary effort is normal.      Breath sounds: Normal breath sounds. No wheezing or rales. Abdominal:      General: Abdomen is flat. There is no distension. Palpations: Abdomen is soft. Tenderness: There is no abdominal tenderness. Musculoskeletal:         General: Normal range of motion. Cervical back: Normal range of motion and neck supple. Right lower leg: No edema. Left lower leg: No edema. Lymphadenopathy:      Cervical: No cervical adenopathy. Skin:     General: Skin is warm. Neurological:      General: No focal deficit present. Mental Status: She is alert and oriented to person, place, and time.

## 2023-12-12 ENCOUNTER — TELEPHONE (OUTPATIENT)
Dept: INTERNAL MEDICINE CLINIC | Facility: CLINIC | Age: 63
End: 2023-12-12

## 2023-12-12 DIAGNOSIS — E11.65 UNCONTROLLED TYPE 2 DIABETES MELLITUS WITH HYPERGLYCEMIA (HCC): ICD-10-CM

## 2023-12-12 RX ORDER — GLIPIZIDE AND METFORMIN HCL 2.5; 5 MG/1; MG/1
1 TABLET, FILM COATED ORAL
Qty: 60 TABLET | Refills: 2 | Status: SHIPPED | OUTPATIENT
Start: 2023-12-12

## 2023-12-12 RX ORDER — SITAGLIPTIN AND METFORMIN HYDROCHLORIDE 500; 50 MG/1; MG/1
1 TABLET, FILM COATED ORAL 2 TIMES DAILY WITH MEALS
Qty: 180 TABLET | Refills: 1 | Status: SHIPPED | OUTPATIENT
Start: 2023-12-12 | End: 2023-12-12

## 2023-12-12 NOTE — TELEPHONE ENCOUNTER
Please let her know alternative faxed to pharmacy, have her mother check the sugar more often after changing the medication, and if she has any questions she can call me

## 2023-12-12 NOTE — TELEPHONE ENCOUNTER
Patients daughter called and stated the janumet  mg is to expensive for her mom could you prescribe a medication that might be cheaper for her , can you give them a call about this if you change the medication they use [facility] [address]

## 2024-04-22 ENCOUNTER — RA CDI HCC (OUTPATIENT)
Dept: OTHER | Facility: HOSPITAL | Age: 64
End: 2024-04-22

## 2024-04-22 PROBLEM — Z86.16 HISTORY OF COVID-19: Status: ACTIVE | Noted: 2024-04-22

## 2024-04-22 PROBLEM — Z86.16 HISTORY OF COVID-19: Status: ACTIVE | Noted: 2021-05-18

## 2024-04-22 PROBLEM — Z20.828 EXPOSURE TO SARS-ASSOCIATED CORONAVIRUS: Status: RESOLVED | Noted: 2020-09-10 | Resolved: 2024-04-22

## 2024-04-22 PROBLEM — Z12.11 ENCOUNTER FOR SCREENING COLONOSCOPY: Status: RESOLVED | Noted: 2021-03-30 | Resolved: 2024-04-22

## 2024-04-22 PROBLEM — Z12.31 ENCOUNTER FOR SCREENING MAMMOGRAM FOR MALIGNANT NEOPLASM OF BREAST: Status: RESOLVED | Noted: 2021-03-30 | Resolved: 2024-04-22

## 2024-04-22 NOTE — PROGRESS NOTES
HCC coding opportunities          Chart Reviewed number of suggestions sent to Provider: 2  E11.36  J45.40  E66.01     Patients Insurance        Commercial Insurance: [employer]

## 2024-04-24 ENCOUNTER — TELEPHONE (OUTPATIENT)
Age: 64
End: 2024-04-24

## 2024-04-24 NOTE — TELEPHONE ENCOUNTER
Patient has appointment scheduled for Monday, 4/29. She would like to  the scripts for lab work at the [facility] office tomorrow (4/25). Please have lab slips printed and available for patient to  at office.

## 2024-04-26 LAB
ALBUMIN SERPL-MCNC: 4.2 G/DL (ref 3.5–5.7)
ALBUMIN/CREAT UR: 26.4
ALP SERPL-CCNC: 56 U/L (ref 35–120)
ALT SERPL-CCNC: 14 U/L
ANION GAP SERPL CALCULATED.3IONS-SCNC: 10 MMOL/L (ref 3–11)
AST SERPL-CCNC: 14 U/L
BASOPHILS # BLD AUTO: 0.1 THOU/CMM (ref 0–0.1)
BASOPHILS NFR BLD AUTO: 1 %
BILIRUB SERPL-MCNC: 0.5 MG/DL (ref 0.2–1)
BUN SERPL-MCNC: 18 MG/DL (ref 7–25)
CALCIUM SERPL-MCNC: 9.8 MG/DL (ref 8.5–10.1)
CHLORIDE SERPL-SCNC: 99 MMOL/L (ref 100–109)
CHOLEST SERPL-MCNC: 152 MG/DL
CHOLEST/HDLC SERPL: 3.3 {RATIO}
CO2 SERPL-SCNC: 31 MMOL/L (ref 21–31)
CREAT SERPL-MCNC: 0.62 MG/DL (ref 0.4–1.1)
CREAT UR-MCNC: 90.8 MG/DL (ref 50–200)
CYTOLOGY CMNT CVX/VAG CYTO-IMP: ABNORMAL
DIFFERENTIAL METHOD BLD: NORMAL
EOSINOPHIL # BLD AUTO: 0.1 THOU/CMM (ref 0–0.5)
EOSINOPHIL NFR BLD AUTO: 2 %
ERYTHROCYTE [DISTWIDTH] IN BLOOD BY AUTOMATED COUNT: 13.7 % (ref 12–16)
EST. AVERAGE GLUCOSE BLD GHB EST-MCNC: 148 MG/DL
GFR/BSA.PRED SERPLBLD CYS-BASED-ARV: 99 ML/MIN/{1.73_M2}
GLUCOSE SERPL-MCNC: 146 MG/DL (ref 65–99)
HBA1C MFR BLD: 6.8 %
HCT VFR BLD AUTO: 37.9 % (ref 35–43)
HDLC SERPL-MCNC: 46 MG/DL (ref 23–92)
HGB BLD-MCNC: 12.8 G/DL (ref 11.5–14.5)
LDLC SERPL CALC-MCNC: 83 MG/DL
LYMPHOCYTES # BLD AUTO: 1.9 THOU/CMM (ref 1–3)
LYMPHOCYTES NFR BLD AUTO: 37 %
MCH RBC QN AUTO: 30.1 PG (ref 26–34)
MCHC RBC AUTO-ENTMCNC: 33.7 G/DL (ref 32–37)
MCV RBC AUTO: 89 FL (ref 80–100)
MICROALBUMIN UR-MCNC: 2.4 MG/DL
MONOCYTES # BLD AUTO: 0.5 THOU/CMM (ref 0.3–1)
MONOCYTES NFR BLD AUTO: 9 %
NEUTROPHILS # BLD AUTO: 2.7 THOU/CMM (ref 1.8–7.8)
NEUTROPHILS NFR BLD AUTO: 51 %
NONHDLC SERPL-MCNC: 106 MG/DL
PLATELET # BLD AUTO: 191 THOU/CMM (ref 140–350)
PMV BLD REES-ECKER: 7.8 FL (ref 7.5–11.3)
POTASSIUM SERPL-SCNC: 3.8 MMOL/L (ref 3.5–5.2)
PROT SERPL-MCNC: 7.1 G/DL (ref 6.3–8.3)
RBC # BLD AUTO: 4.24 MILL/CMM (ref 3.7–4.7)
SODIUM SERPL-SCNC: 140 MMOL/L (ref 135–145)
TRIGL SERPL-MCNC: 114 MG/DL
TSH SERPL-ACNC: 0.75 UIU/ML (ref 0.45–5.33)
WBC # BLD AUTO: 5.2 THOU/CMM (ref 4–10)

## 2024-04-26 PROCEDURE — 3044F HG A1C LEVEL LT 7.0%: CPT | Performed by: INTERNAL MEDICINE

## 2024-04-26 PROCEDURE — 3061F NEG MICROALBUMINURIA REV: CPT | Performed by: INTERNAL MEDICINE

## 2024-04-27 LAB — VIT B12 SERPL-MCNC: 208 PG/ML (ref 180–914)

## 2024-04-29 ENCOUNTER — OFFICE VISIT (OUTPATIENT)
Dept: INTERNAL MEDICINE CLINIC | Facility: CLINIC | Age: 64
End: 2024-04-29
Payer: COMMERCIAL

## 2024-04-29 VITALS
BODY MASS INDEX: 36.31 KG/M2 | HEART RATE: 92 BPM | WEIGHT: 173 LBS | OXYGEN SATURATION: 97 % | TEMPERATURE: 98.5 F | DIASTOLIC BLOOD PRESSURE: 86 MMHG | HEIGHT: 58 IN | SYSTOLIC BLOOD PRESSURE: 136 MMHG

## 2024-04-29 DIAGNOSIS — I10 ESSENTIAL HYPERTENSION: ICD-10-CM

## 2024-04-29 DIAGNOSIS — Z23 NEED FOR PROPHYLACTIC VACCINATION AND INOCULATION AGAINST VARICELLA: ICD-10-CM

## 2024-04-29 DIAGNOSIS — I83.813 VARICOSE VEINS OF BOTH LOWER EXTREMITIES WITH PAIN: ICD-10-CM

## 2024-04-29 DIAGNOSIS — Z00.00 ANNUAL PHYSICAL EXAM: ICD-10-CM

## 2024-04-29 DIAGNOSIS — M17.0 PRIMARY OSTEOARTHRITIS OF BOTH KNEES: ICD-10-CM

## 2024-04-29 DIAGNOSIS — E53.8 B12 DEFICIENCY: ICD-10-CM

## 2024-04-29 DIAGNOSIS — E78.2 MIXED HYPERLIPIDEMIA: ICD-10-CM

## 2024-04-29 DIAGNOSIS — E11.65 UNCONTROLLED TYPE 2 DIABETES MELLITUS WITH HYPERGLYCEMIA (HCC): Primary | ICD-10-CM

## 2024-04-29 DIAGNOSIS — E11.36 TYPE 2 DIABETES MELLITUS WITH DIABETIC CATARACT, WITHOUT LONG-TERM CURRENT USE OF INSULIN (HCC): ICD-10-CM

## 2024-04-29 DIAGNOSIS — E55.9 VITAMIN D DEFICIENCY: ICD-10-CM

## 2024-04-29 PROCEDURE — 99396 PREV VISIT EST AGE 40-64: CPT | Performed by: INTERNAL MEDICINE

## 2024-04-29 PROCEDURE — 99214 OFFICE O/P EST MOD 30 MIN: CPT | Performed by: INTERNAL MEDICINE

## 2024-04-29 PROCEDURE — 3079F DIAST BP 80-89 MM HG: CPT | Performed by: INTERNAL MEDICINE

## 2024-04-29 PROCEDURE — 3725F SCREEN DEPRESSION PERFORMED: CPT | Performed by: INTERNAL MEDICINE

## 2024-04-29 PROCEDURE — 3075F SYST BP GE 130 - 139MM HG: CPT | Performed by: INTERNAL MEDICINE

## 2024-04-29 RX ORDER — ROSUVASTATIN CALCIUM 10 MG/1
10 TABLET, COATED ORAL DAILY
Qty: 90 TABLET | Refills: 1 | Status: SHIPPED | OUTPATIENT
Start: 2024-04-29

## 2024-04-29 RX ORDER — GLIPIZIDE 5 MG/1
5 TABLET ORAL
Qty: 90 TABLET | Refills: 1 | Status: SHIPPED | OUTPATIENT
Start: 2024-04-29

## 2024-04-29 RX ORDER — ZOSTER VACCINE RECOMBINANT, ADJUVANTED 50 MCG/0.5
0.5 KIT INTRAMUSCULAR ONCE
Qty: 1 EACH | Refills: 1 | Status: SHIPPED | OUTPATIENT
Start: 2024-04-29 | End: 2024-04-29

## 2024-04-29 RX ORDER — LOSARTAN POTASSIUM AND HYDROCHLOROTHIAZIDE 25; 100 MG/1; MG/1
1 TABLET ORAL DAILY
Qty: 90 TABLET | Refills: 1 | Status: SHIPPED | OUTPATIENT
Start: 2024-04-29

## 2024-04-29 RX ORDER — VERAPAMIL HYDROCHLORIDE 240 MG/1
240 CAPSULE, EXTENDED RELEASE ORAL
Qty: 90 CAPSULE | Refills: 1 | Status: SHIPPED | OUTPATIENT
Start: 2024-04-29

## 2024-04-29 RX ORDER — LANOLIN ALCOHOL/MO/W.PET/CERES
1000 CREAM (GRAM) TOPICAL DAILY
Qty: 90 TABLET | Refills: 1 | Status: SHIPPED | OUTPATIENT
Start: 2024-04-29

## 2024-04-29 NOTE — PROGRESS NOTES
Diabetic Foot Exam    Patient's shoes and socks removed.    Right Foot/Ankle   Right Foot Inspection  Skin Exam: skin normal. Skin not intact, no dry skin, no warmth, no callus, no erythema, no maceration, no abnormal color, no pre-ulcer, no ulcer and no callus.     Toe Exam: ROM and strength within normal limits. No swelling, no tenderness, erythema and  no right toe deformity    Sensory   Monofilament testing: intact    Vascular  Capillary refills: < 3 seconds  The right DP pulse is 2+. The right PT pulse is 2+.     Left Foot/Ankle  Left Foot Inspection  Skin Exam: skin normal. Skin not intact, no dry skin, no warmth, no erythema, no maceration, normal color, no pre-ulcer, no ulcer and no callus.     Toe Exam: ROM and strength within normal limits. No swelling, no tenderness, no erythema and no left toe deformity.     Sensory   Monofilament testing: intact    Vascular  Capillary refills: < 3 seconds  The left DP pulse is 2+. The left PT pulse is 2+.     Assign Risk Category  No deformity present  No loss of protective sensation  No weak pulses  Risk: 0    ADULT ANNUAL PHYSICAL  [facility] INTERNAL MEDICINE [facility]    NAME: Michelle Rosa  AGE: 63 y.o. SEX: female  : 1960     DATE: 2024     Assessment and Plan:     Problem List Items Addressed This Visit          Cardiovascular and Mediastinum    Essential hypertension    Relevant Medications    losartan-hydrochlorothiazide (HYZAAR) 100-25 MG per tablet    verapamil (Verelan) 240 MG 24 hr capsule    Varicose veins of both lower extremities with pain       Respiratory    Moderate persistent asthma without complication       Endocrine    Diabetes mellitus (HCC)    Relevant Medications    glipiZIDE (GLUCOTROL) 5 mg tablet    dulaglutide (Trulicity) 3 MG/0.5ML injection    Uncontrolled type 2 diabetes mellitus with hyperglycemia (HCC) - Primary    Relevant Medications    glipiZIDE (GLUCOTROL) 5 mg tablet     dulaglutide (Trulicity) 3 MG/0.5ML injection       Musculoskeletal and Integument    Primary osteoarthritis of both knees       Other    Vitamin D deficiency    Mixed hyperlipidemia    Relevant Medications    rosuvastatin (CRESTOR) 10 MG tablet    B12 deficiency    Relevant Medications    vitamin B-12 (VITAMIN B-12) 1,000 mcg tablet     Other Visit Diagnoses       Need for prophylactic vaccination and inoculation against varicella        Relevant Medications    Zoster Vac Recomb Adjuvanted (Shingrix) 50 MCG/0.5ML SUSR    Annual physical exam                Immunizations and preventive care screenings were discussed with patient today. Appropriate education was printed on patient's after visit summary.    Counseling:  Exercise: the importance of regular exercise/physical activity was discussed. Recommend exercise 3-5 times per week for at least 30 minutes.          No follow-ups on file.     Chief Complaint:     Chief Complaint   Patient presents with    Follow-up      History of Present Illness:     Adult Annual Physical   Patient here for a comprehensive physical exam. The patient reports problems - lbp .    Diet and Physical Activity  Diet/Nutrition: diabetic diet.   Exercise: moderate cardiovascular exercise.      Depression Screening  PHQ-2/9 Depression Screening    Little interest or pleasure in doing things: 0 - not at all  Feeling down, depressed, or hopeless: 0 - not at all  PHQ-2 Score: 0  PHQ-2 Interpretation: Negative depression screen       General Health  Sleep: sleeps well.   Hearing: normal - bilateral.  Vision: no vision problems.   Dental: no dental visits for >1 year.       /GYN Health  Follows with gynecology? yes   Patient is: -  Last menstrual period: -  Contraceptive method:  - .    Advanced Care Planning  Do you have an advanced directive? no  Do you have a durable medical power of ? yes  ACP document given to the patient? yes     Review of Systems:     Review of Systems    Constitutional:  Negative for chills and fever.   HENT:  Negative for congestion, ear pain and sore throat.    Eyes:  Negative for pain.   Respiratory:  Negative for cough and shortness of breath.    Cardiovascular:  Negative for chest pain and leg swelling.   Gastrointestinal:  Negative for abdominal pain, nausea and vomiting.   Endocrine: Negative for polyuria.   Genitourinary:  Negative for difficulty urinating, frequency and urgency.   Musculoskeletal:  Positive for arthralgias and back pain.   Skin:  Negative for rash.   Neurological:  Negative for weakness and headaches.   Psychiatric/Behavioral:  Negative for sleep disturbance. The patient is not nervous/anxious.       Past Medical History:     Past Medical History:   Diagnosis Date    Diabetes mellitus (HCC)     Hypercholesterolemia     Hypertension     Rheumatoid arthritis (HCC)     Sarcoidosis       Past Surgical History:     Past Surgical History:   Procedure Laterality Date    CHOLECYSTECTOMY      COLONOSCOPY  05/15/2009    Abnormal     KNEE SURGERY Left 10/2022    MAMMO (HISTORICAL)  09/30/2016    Negative       Social History:     Social History     Socioeconomic History    Marital status: /Civil Union     Spouse name: None    Number of children: None    Years of education: None    Highest education level: None   Occupational History    None   Tobacco Use    Smoking status: Never    Smokeless tobacco: Never   Vaping Use    Vaping status: Never Used   Substance and Sexual Activity    Alcohol use: Not Currently    Drug use: Never    Sexual activity: Yes     Partners: Male   Other Topics Concern    None   Social History Narrative    Family: 3 brothers, 3 sisters, 3 sons, 3 daughters    Caffeine: Yes     Social Determinants of Health     Financial Resource Strain: Not on file   Food Insecurity: Not on file   Transportation Needs: Not on file   Physical Activity: Not on file   Stress: Not on file   Social Connections: Not on file   Intimate Partner  Violence: Not on file   Housing Stability: Not on file      Family History:     Family History   Problem Relation Age of Onset    Diabetes Mother     Coronary artery disease Father     No Known Problems Sister     No Known Problems Sister     No Known Problems Sister     No Known Problems Daughter     No Known Problems Daughter     No Known Problems Daughter     Diabetes Maternal Grandmother     Stomach cancer Maternal Grandfather     No Known Problems Paternal Grandmother     No Known Problems Son     No Known Problems Son     No Known Problems Son     No Known Problems Maternal Aunt     No Known Problems Maternal Aunt     No Known Problems Maternal Aunt     No Known Problems Paternal Aunt     No Known Problems Paternal Aunt       Current Medications:     Current Outpatient Medications   Medication Sig Dispense Refill    clotrimazole (LOTRIMIN) 1 % cream Apply topically 2 (two) times a day 60 g 1    dulaglutide (Trulicity) 3 MG/0.5ML injection Inject 0.5 mL (3 mg total) under the skin once a week 6 mL 1    glipiZIDE (GLUCOTROL) 5 mg tablet Take 1 tablet (5 mg total) by mouth daily with breakfast 90 tablet 1    Insulin Pen Needle 29G X 10MM MISC Use once a week 13 each 1    losartan-hydrochlorothiazide (HYZAAR) 100-25 MG per tablet Take 1 tablet by mouth daily 90 tablet 1    rosuvastatin (CRESTOR) 10 MG tablet Take 1 tablet (10 mg total) by mouth daily 90 tablet 1    UltiCare Mini Pen Needles 31G X 6 MM MISC       verapamil (Verelan) 240 MG 24 hr capsule Take 1 capsule (240 mg total) by mouth daily at bedtime 90 capsule 1    vitamin B-12 (VITAMIN B-12) 1,000 mcg tablet Take 1 tablet (1,000 mcg total) by mouth daily 90 tablet 1    Zoster Vac Recomb Adjuvanted (Shingrix) 50 MCG/0.5ML SUSR Inject 0.5 mL into a muscle once for 1 dose Repeat dose in 2 to 6 months 1 each 1    meloxicam (Mobic) 15 mg tablet Take 1 tablet (15 mg total) by mouth daily (Patient not taking: Reported on 10/21/2022) 30 tablet 0    ondansetron  "(ZOFRAN) 4 mg tablet take 1 tablet by mouth once daily if needed for nausea and vomiting (Patient not taking: Reported on 3/22/2023)       No current facility-administered medications for this visit.      Allergies:     Allergies   Allergen Reactions    Metformin Abdominal Pain    Sulfa Antibiotics       Physical Exam:     /86 (BP Location: Left arm, Patient Position: Sitting, Cuff Size: Standard)   Pulse 92   Temp 98.5 °F (36.9 °C)   Ht 4' 10\" (1.473 m)   Wt 78.5 kg (173 lb)   SpO2 97%   BMI 36.16 kg/m²     Physical Exam  Vitals and nursing note reviewed.   Constitutional:       General: She is not in acute distress.     Appearance: She is well-developed.   HENT:      Head: Normocephalic and atraumatic.   Eyes:      Conjunctiva/sclera: Conjunctivae normal.   Cardiovascular:      Rate and Rhythm: Normal rate and regular rhythm.      Pulses: no weak pulses.           Dorsalis pedis pulses are 2+ on the right side and 2+ on the left side.        Posterior tibial pulses are 2+ on the right side and 2+ on the left side.      Heart sounds: No murmur heard.  Pulmonary:      Effort: Pulmonary effort is normal. No respiratory distress.      Breath sounds: Normal breath sounds.   Abdominal:      Palpations: Abdomen is soft.      Tenderness: There is no abdominal tenderness.   Musculoskeletal:         General: No swelling.      Cervical back: Neck supple.   Feet:      Right foot:      Skin integrity: No ulcer, skin breakdown, erythema, warmth, callus or dry skin.      Left foot:      Skin integrity: No ulcer, skin breakdown, erythema, warmth, callus or dry skin.   Skin:     General: Skin is warm and dry.      Capillary Refill: Capillary refill takes less than 2 seconds.   Neurological:      Mental Status: She is alert.   Psychiatric:         Mood and Affect: Mood normal.          Fausto Jimenez MD  [facility] INTERNAL MEDICINE [facility] [place]"

## 2024-04-29 NOTE — PROGRESS NOTES
Assessment/Plan:      Depression Screening and Follow-up Plan: Patient was screened for depression during today's encounter. They screened negative with a PHQ-2 score of 0.            1. Uncontrolled type 2 diabetes mellitus with hyperglycemia (HCC)  -     glipiZIDE (GLUCOTROL) 5 mg tablet; Take 1 tablet (5 mg total) by mouth daily with breakfast  -     dulaglutide (Trulicity) 3 MG/0.5ML injection; Inject 0.5 mL (3 mg total) under the skin once a week    2. Essential hypertension  Comments:  Stable, continue same medication  Orders:  -     losartan-hydrochlorothiazide (HYZAAR) 100-25 MG per tablet; Take 1 tablet by mouth daily  -     verapamil (Verelan) 240 MG 24 hr capsule; Take 1 capsule (240 mg total) by mouth daily at bedtime    3. Varicose veins of both lower extremities with pain    4. Primary osteoarthritis of both knees    5. Vitamin D deficiency    6. Mixed hyperlipidemia  Comments:  Stable, continue same medication  Orders:  -     rosuvastatin (CRESTOR) 10 MG tablet; Take 1 tablet (10 mg total) by mouth daily    7. B12 deficiency  -     vitamin B-12 (VITAMIN B-12) 1,000 mcg tablet; Take 1 tablet (1,000 mcg total) by mouth daily    8. Type 2 diabetes mellitus with diabetic cataract, without long-term current use of insulin (HCC)    9. Need for prophylactic vaccination and inoculation against varicella  -     Zoster Vac Recomb Adjuvanted (Shingrix) 50 MCG/0.5ML SUSR; Inject 0.5 mL into a muscle once for 1 dose Repeat dose in 2 to 6 months    10. Annual physical exam           Subjective:      Patient ID: Michelle Rosa is a 63 y.o. female.    Follow-up on blood test done on 4/26/2024 test discussed with her, also physical        The following portions of the patient's history were reviewed and updated as appropriate: She  has a past medical history of Diabetes mellitus (HCC), Hypercholesterolemia, Hypertension, Rheumatoid arthritis (HCC), and Sarcoidosis.  She   Patient Active Problem List    Diagnosis Date  Noted    B12 deficiency 04/29/2024    Supraclavicular mass 05/10/2023    Neck swelling 05/10/2023    Primary osteoarthritis of left knee 11/04/2022    Status post left knee replacement 11/04/2022    Rotator cuff tendonitis, left 04/08/2022    Nausea 05/18/2021    Mixed hyperlipidemia 05/18/2021    History of COVID-19 05/18/2021    Essential hypertension, benign 04/21/2021    Primary osteoarthritis of both knees 04/21/2021    Varicose veins of both lower extremities with pain 03/30/2021    Uncontrolled type 2 diabetes mellitus with hyperglycemia (HCC) 03/30/2021    Moderate persistent asthma without complication 12/23/2020    Allergic rhinitis 03/18/2020    Asthma 03/18/2020    Hyperlipidemia 03/18/2020    Sarcoidosis 03/18/2020    Thalassemia 03/18/2020    Vitamin D deficiency 03/18/2020    Diabetes mellitus (HCC) 03/14/2019    Essential hypertension 03/14/2019     She  has a past surgical history that includes Colonoscopy (05/15/2009); Cholecystectomy; Mammo (historical) (09/30/2016); and Knee surgery (Left, 10/2022).  Her family history includes Coronary artery disease in her father; Diabetes in her maternal grandmother and mother; No Known Problems in her daughter, daughter, daughter, maternal aunt, maternal aunt, maternal aunt, paternal aunt, paternal aunt, paternal grandmother, sister, sister, sister, son, son, and son; Stomach cancer in her maternal grandfather.  She  reports that she has never smoked. She has never used smokeless tobacco. She reports that she does not currently use alcohol. She reports that she does not use drugs.  Current Outpatient Medications   Medication Sig Dispense Refill    clotrimazole (LOTRIMIN) 1 % cream Apply topically 2 (two) times a day 60 g 1    dulaglutide (Trulicity) 3 MG/0.5ML injection Inject 0.5 mL (3 mg total) under the skin once a week 6 mL 1    glipiZIDE (GLUCOTROL) 5 mg tablet Take 1 tablet (5 mg total) by mouth daily with breakfast 90 tablet 1    Insulin Pen Needle 29G  X 10MM MISC Use once a week 13 each 1    losartan-hydrochlorothiazide (HYZAAR) 100-25 MG per tablet Take 1 tablet by mouth daily 90 tablet 1    rosuvastatin (CRESTOR) 10 MG tablet Take 1 tablet (10 mg total) by mouth daily 90 tablet 1    UltiCare Mini Pen Needles 31G X 6 MM MISC       verapamil (Verelan) 240 MG 24 hr capsule Take 1 capsule (240 mg total) by mouth daily at bedtime 90 capsule 1    vitamin B-12 (VITAMIN B-12) 1,000 mcg tablet Take 1 tablet (1,000 mcg total) by mouth daily 90 tablet 1    Zoster Vac Recomb Adjuvanted (Shingrix) 50 MCG/0.5ML SUSR Inject 0.5 mL into a muscle once for 1 dose Repeat dose in 2 to 6 months 1 each 1    meloxicam (Mobic) 15 mg tablet Take 1 tablet (15 mg total) by mouth daily (Patient not taking: Reported on 10/21/2022) 30 tablet 0    ondansetron (ZOFRAN) 4 mg tablet take 1 tablet by mouth once daily if needed for nausea and vomiting (Patient not taking: Reported on 3/22/2023)       No current facility-administered medications for this visit.     Current Outpatient Medications on File Prior to Visit   Medication Sig    clotrimazole (LOTRIMIN) 1 % cream Apply topically 2 (two) times a day    Insulin Pen Needle 29G X 10MM MISC Use once a week    UltiCare Mini Pen Needles 31G X 6 MM MISC     [DISCONTINUED] dulaglutide (Trulicity) 3 MG/0.5ML injection Inject 0.5 mL (3 mg total) under the skin once a week    [DISCONTINUED] glipiZIDE-metFORMIN (METAGLIP) 2.5-500 MG per tablet Take 1 tablet by mouth 2 (two) times a day before meals    [DISCONTINUED] losartan-hydrochlorothiazide (HYZAAR) 100-25 MG per tablet Take 1 tablet by mouth daily    [DISCONTINUED] rosuvastatin (CRESTOR) 10 MG tablet Take 1 tablet (10 mg total) by mouth daily    [DISCONTINUED] verapamil (VERELAN) 240 MG 24 hr capsule Take 1 capsule (240 mg total) by mouth daily at bedtime    meloxicam (Mobic) 15 mg tablet Take 1 tablet (15 mg total) by mouth daily (Patient not taking: Reported on 10/21/2022)    ondansetron  "(ZOFRAN) 4 mg tablet take 1 tablet by mouth once daily if needed for nausea and vomiting (Patient not taking: Reported on 3/22/2023)     No current facility-administered medications on file prior to visit.     She is allergic to metformin and sulfa antibiotics..    Review of Systems   Constitutional:  Negative for chills and fever.   HENT:  Negative for congestion, ear pain and sore throat.    Eyes:  Negative for pain.   Respiratory:  Negative for cough and shortness of breath.    Cardiovascular:  Negative for chest pain and leg swelling.   Gastrointestinal:  Negative for abdominal pain, nausea and vomiting.   Endocrine: Negative for polyuria.   Genitourinary:  Negative for difficulty urinating, frequency and urgency.   Musculoskeletal:  Positive for arthralgias and back pain.   Skin:  Negative for rash.   Neurological:  Negative for weakness and headaches.   Psychiatric/Behavioral:  Negative for sleep disturbance. The patient is not nervous/anxious.          Objective:      /86 (BP Location: Left arm, Patient Position: Sitting, Cuff Size: Standard)   Pulse 92   Temp 98.5 °F (36.9 °C)   Ht 4' 10\" (1.473 m)   Wt 78.5 kg (173 lb)   SpO2 97%   BMI 36.16 kg/m²     Recent Results (from the past 1344 hour(s))   CBC and differential    Collection Time: 04/26/24 10:07 AM   Result Value Ref Range    Hemoglobin 12.8 11.5 - 14.5 g/dL    HCT 37.9 35.0 - 43.0 %    White Blood Cell Count 5.2 4.0 - 10.0 thou/cmm    Red Blood Cell Count 4.24 3.70 - 4.70 mill/cmm    Platelet Count 191 140 - 350 thou/cmm    SL AMB MPV 7.8 7.5 - 11.3 fL    MCV 89 80 - 100 fL    MCH 30.1 26.0 - 34.0 pg    MCHC 33.7 32.0 - 37.0 g/dL    RDW 13.7 12.0 - 16.0 %    Differential Type AUTO     Neutrophils (Absolute) 2.7 1.8 - 7.8 thou/cmm    Lymphocytes (Absolute) 1.9 1.0 - 3.0 thou/cmm    Monocytes (Absolute) 0.5 0.3 - 1.0 thou/cmm    Eosinophils (Absolute) 0.1 0.0 - 0.5 thou/cmm    Basophils ABS 0.1 0.0 - 0.1 thou/cmm    Neutrophils 51 %    " Lymphocytes 37 %    Monocytes 9 %    Eosinophils 2 %    Basophils PCT 1 %   Comprehensive metabolic panel    Collection Time: 04/26/24 10:07 AM   Result Value Ref Range    Glucose, Random 146 (H) 65 - 99 mg/dL    BUN 18 7 - 25 mg/dL    Creatinine 0.62 0.40 - 1.10 mg/dL    Sodium 140 135 - 145 mmol/L    Potassium 3.8 3.5 - 5.2 mmol/L    Chloride 99 (L) 100 - 109 mmol/L    CO2 31 21 - 31 mmol/L    Calcium 9.8 8.5 - 10.1 mg/dL    Alkaline Phosphatase 56 35 - 120 U/L    Albumin 4.2 3.5 - 5.7 g/dL    TOTAL BILIRUBIN 0.5 0.2 - 1.0 mg/dL    Protein, Total 7.1 6.3 - 8.3 g/dL    AST 14 <41 U/L    ALT 14 <56 U/L    ANION GAP 10 3 - 11    eGFR 99 >59    Comment (Note)    Lipid Panel with Direct LDL reflex    Collection Time: 04/26/24 10:07 AM   Result Value Ref Range    Cholesterol, Total 152 <200 mg/dL    Triglycerides 114 <150 mg/dL    HDL Cholesterol 46 23 - 92 mg/dL    Non HDL Chol. (LDL+VLDL) 106 <160 mg/dL    Chol HDLC Ratio 3.3     LDL Calculated 83 <130 mg/dL   Hemoglobin A1C    Collection Time: 04/26/24 10:07 AM   Result Value Ref Range    Hemoglobin A1C 6.8 (H) <5.7 %    Estimated Average Glucose 148 mg/dL   Albumin / creatinine urine ratio    Collection Time: 04/26/24 10:07 AM   Result Value Ref Range    Creatinine(Crt),U 90.8 50.0 - 200.0 mg/dL    Albumin,U,Random 2.4 <3.0 mg/dL    Microalb/Creat Ratio 26.4 <30.0 mg/gm CREA   TSH, 3rd generation    Collection Time: 04/26/24 10:07 AM   Result Value Ref Range    TSH 0.75 0.45 - 5.33 uIU/mL   Vitamin B12    Collection Time: 04/26/24 10:07 AM   Result Value Ref Range    Vitamin B-12 208 180 - 914 pg/mL        Physical Exam  Constitutional:       Appearance: Normal appearance.   HENT:      Head: Normocephalic.      Right Ear: Tympanic membrane, ear canal and external ear normal.      Left Ear: Tympanic membrane, ear canal and external ear normal.      Nose: Nose normal. No congestion.      Mouth/Throat:      Mouth: Mucous membranes are moist.      Pharynx: Oropharynx is  clear. No oropharyngeal exudate or posterior oropharyngeal erythema.   Eyes:      Extraocular Movements: Extraocular movements intact.      Conjunctiva/sclera: Conjunctivae normal.   Cardiovascular:      Rate and Rhythm: Normal rate and regular rhythm.      Heart sounds: Normal heart sounds. No murmur heard.  Pulmonary:      Effort: Pulmonary effort is normal.      Breath sounds: Normal breath sounds. No wheezing or rales.   Abdominal:      General: Abdomen is flat. There is no distension.      Palpations: Abdomen is soft.      Tenderness: There is no abdominal tenderness.   Musculoskeletal:      Cervical back: Normal range of motion and neck supple.      Right lower leg: No edema.      Left lower leg: No edema.   Lymphadenopathy:      Cervical: No cervical adenopathy.   Skin:     General: Skin is warm.   Neurological:      General: No focal deficit present.      Mental Status: She is alert and oriented to person, place, and time.

## 2024-06-19 LAB
LEFT EYE DIABETIC RETINOPATHY: NORMAL
RIGHT EYE DIABETIC RETINOPATHY: NORMAL

## 2024-07-29 ENCOUNTER — OFFICE VISIT (OUTPATIENT)
Dept: INTERNAL MEDICINE CLINIC | Facility: CLINIC | Age: 64
End: 2024-07-29
Payer: COMMERCIAL

## 2024-07-29 VITALS
HEART RATE: 97 BPM | HEIGHT: 58 IN | SYSTOLIC BLOOD PRESSURE: 128 MMHG | TEMPERATURE: 98.3 F | DIASTOLIC BLOOD PRESSURE: 82 MMHG | OXYGEN SATURATION: 98 % | BODY MASS INDEX: 38.41 KG/M2 | WEIGHT: 183 LBS

## 2024-07-29 DIAGNOSIS — M17.0 PRIMARY OSTEOARTHRITIS OF BOTH KNEES: ICD-10-CM

## 2024-07-29 DIAGNOSIS — I83.813 VARICOSE VEINS OF BOTH LOWER EXTREMITIES WITH PAIN: ICD-10-CM

## 2024-07-29 DIAGNOSIS — E53.8 B12 DEFICIENCY: ICD-10-CM

## 2024-07-29 DIAGNOSIS — Z13.820 SCREENING FOR OSTEOPOROSIS: ICD-10-CM

## 2024-07-29 DIAGNOSIS — M12.812 ROTATOR CUFF ARTHROPATHY OF LEFT SHOULDER: ICD-10-CM

## 2024-07-29 DIAGNOSIS — Z96.652 STATUS POST LEFT KNEE REPLACEMENT: ICD-10-CM

## 2024-07-29 DIAGNOSIS — I10 ESSENTIAL HYPERTENSION: ICD-10-CM

## 2024-07-29 DIAGNOSIS — E78.2 MIXED HYPERLIPIDEMIA: ICD-10-CM

## 2024-07-29 DIAGNOSIS — E11.65 UNCONTROLLED TYPE 2 DIABETES MELLITUS WITH HYPERGLYCEMIA (HCC): Primary | ICD-10-CM

## 2024-07-29 PROBLEM — E78.5 HYPERLIPIDEMIA: Status: RESOLVED | Noted: 2020-03-18 | Resolved: 2024-07-29

## 2024-07-29 PROCEDURE — 3074F SYST BP LT 130 MM HG: CPT | Performed by: INTERNAL MEDICINE

## 2024-07-29 PROCEDURE — 3725F SCREEN DEPRESSION PERFORMED: CPT | Performed by: INTERNAL MEDICINE

## 2024-07-29 PROCEDURE — 99214 OFFICE O/P EST MOD 30 MIN: CPT | Performed by: INTERNAL MEDICINE

## 2024-07-29 PROCEDURE — 3079F DIAST BP 80-89 MM HG: CPT | Performed by: INTERNAL MEDICINE

## 2024-07-29 RX ORDER — GLIPIZIDE 5 MG/1
5 TABLET ORAL
Qty: 90 TABLET | Refills: 1 | Status: SHIPPED | OUTPATIENT
Start: 2024-07-29

## 2024-07-29 RX ORDER — ROSUVASTATIN CALCIUM 10 MG/1
10 TABLET, COATED ORAL DAILY
Qty: 90 TABLET | Refills: 1 | Status: SHIPPED | OUTPATIENT
Start: 2024-07-29

## 2024-07-29 RX ORDER — LOSARTAN POTASSIUM AND HYDROCHLOROTHIAZIDE 25; 100 MG/1; MG/1
1 TABLET ORAL DAILY
Qty: 90 TABLET | Refills: 1 | Status: SHIPPED | OUTPATIENT
Start: 2024-07-29

## 2024-07-29 RX ORDER — VERAPAMIL HYDROCHLORIDE 240 MG/1
240 CAPSULE, EXTENDED RELEASE ORAL
Qty: 90 CAPSULE | Refills: 1 | Status: SHIPPED | OUTPATIENT
Start: 2024-07-29

## 2024-07-29 NOTE — PROGRESS NOTES
Assessment/Plan:      Depression Screening and Follow-up Plan: Patient was screened for depression during today's encounter. They screened negative with a PHQ-2 score of 0.            1. Uncontrolled type 2 diabetes mellitus with hyperglycemia (HCC)  Comments:  continue same medication  Orders:  -     Albumin / creatinine urine ratio; Future  -     CBC and differential; Future  -     Comprehensive metabolic panel; Future  -     Lipid Panel with Direct LDL reflex; Future  -     TSH, 3rd generation; Future  -     Hemoglobin A1C; Future  -     dulaglutide (Trulicity) 3 MG/0.5ML injection; Inject 0.5 mL (3 mg total) under the skin once a week  -     glipiZIDE (GLUCOTROL) 5 mg tablet; Take 1 tablet (5 mg total) by mouth daily with breakfast  2. Rotator cuff arthropathy of left shoulder  -     Ambulatory Referral to Orthopedic Surgery; Future  -     XR shoulder 2+ vw left; Future; Expected date: 07/29/2024  -     MRI shoulder left wo contrast; Future; Expected date: 07/29/2024  3. Essential hypertension  Comments:  Stable, continue same medication  Orders:  -     losartan-hydrochlorothiazide (HYZAAR) 100-25 MG per tablet; Take 1 tablet by mouth daily  -     verapamil (Verelan) 240 MG 24 hr capsule; Take 1 capsule (240 mg total) by mouth daily at bedtime  4. Varicose veins of both lower extremities with pain  5. Screening for osteoporosis  -     DXA bone density spine hip and pelvis; Future; Expected date: 07/29/2024  6. B12 deficiency  -     Vitamin B12; Future  7. Status post left knee replacement  8. Primary osteoarthritis of both knees  9. Mixed hyperlipidemia  Comments:  Stable, continue same medication  Orders:  -     rosuvastatin (CRESTOR) 10 MG tablet; Take 1 tablet (10 mg total) by mouth daily         Subjective:      Patient ID: Michelle Rosa is a 64 y.o. female.    Follow-up on multiple medical problems to ensure they are stable on current medications, left shoulder pain, the same, did try physical therapy, not  much help        The following portions of the patient's history were reviewed and updated as appropriate: She  has a past medical history of Diabetes mellitus (HCC), Hypercholesterolemia, Hypertension, Rheumatoid arthritis (HCC), and Sarcoidosis.  She   Patient Active Problem List    Diagnosis Date Noted    Rotator cuff arthropathy of left shoulder 07/29/2024    B12 deficiency 04/29/2024    Supraclavicular mass 05/10/2023    Neck swelling 05/10/2023    Primary osteoarthritis of left knee 11/04/2022    Status post left knee replacement 11/04/2022    Rotator cuff tendonitis, left 04/08/2022    Nausea 05/18/2021    Mixed hyperlipidemia 05/18/2021    History of COVID-19 05/18/2021    Essential hypertension, benign 04/21/2021    Primary osteoarthritis of both knees 04/21/2021    Varicose veins of both lower extremities with pain 03/30/2021    Uncontrolled type 2 diabetes mellitus with hyperglycemia (HCC) 03/30/2021    Moderate persistent asthma without complication 12/23/2020    Allergic rhinitis 03/18/2020    Asthma 03/18/2020    Sarcoidosis 03/18/2020    Thalassemia 03/18/2020    Vitamin D deficiency 03/18/2020    Diabetes mellitus (HCC) 03/14/2019    Essential hypertension 03/14/2019     She  has a past surgical history that includes Colonoscopy (05/15/2009); Cholecystectomy; Mammo (historical) (09/30/2016); and Knee surgery (Left, 10/2022).  Her family history includes Coronary artery disease in her father; Diabetes in her maternal grandmother and mother; No Known Problems in her daughter, daughter, daughter, maternal aunt, maternal aunt, maternal aunt, paternal aunt, paternal aunt, paternal grandmother, sister, sister, sister, son, son, and son; Stomach cancer in her maternal grandfather.  She  reports that she has never smoked. She has never used smokeless tobacco. She reports that she does not currently use alcohol. She reports that she does not use drugs.  Current Outpatient Medications   Medication Sig Dispense  Refill    dulaglutide (Trulicity) 3 MG/0.5ML injection Inject 0.5 mL (3 mg total) under the skin once a week 6 mL 1    glipiZIDE (GLUCOTROL) 5 mg tablet Take 1 tablet (5 mg total) by mouth daily with breakfast 90 tablet 1    Insulin Pen Needle 29G X 10MM MISC Use once a week 13 each 1    losartan-hydrochlorothiazide (HYZAAR) 100-25 MG per tablet Take 1 tablet by mouth daily 90 tablet 1    rosuvastatin (CRESTOR) 10 MG tablet Take 1 tablet (10 mg total) by mouth daily 90 tablet 1    UltiCare Mini Pen Needles 31G X 6 MM MISC       verapamil (Verelan) 240 MG 24 hr capsule Take 1 capsule (240 mg total) by mouth daily at bedtime 90 capsule 1    vitamin B-12 (VITAMIN B-12) 1,000 mcg tablet Take 1 tablet (1,000 mcg total) by mouth daily 90 tablet 1    clotrimazole (LOTRIMIN) 1 % cream Apply topically 2 (two) times a day (Patient not taking: Reported on 7/29/2024) 60 g 1    meloxicam (Mobic) 15 mg tablet Take 1 tablet (15 mg total) by mouth daily (Patient not taking: Reported on 10/21/2022) 30 tablet 0    ondansetron (ZOFRAN) 4 mg tablet take 1 tablet by mouth once daily if needed for nausea and vomiting (Patient not taking: Reported on 3/22/2023)       No current facility-administered medications for this visit.     Current Outpatient Medications on File Prior to Visit   Medication Sig    Insulin Pen Needle 29G X 10MM MISC Use once a week    UltiCare Mini Pen Needles 31G X 6 MM MISC     vitamin B-12 (VITAMIN B-12) 1,000 mcg tablet Take 1 tablet (1,000 mcg total) by mouth daily    [DISCONTINUED] dulaglutide (Trulicity) 3 MG/0.5ML injection Inject 0.5 mL (3 mg total) under the skin once a week    [DISCONTINUED] glipiZIDE (GLUCOTROL) 5 mg tablet Take 1 tablet (5 mg total) by mouth daily with breakfast    [DISCONTINUED] losartan-hydrochlorothiazide (HYZAAR) 100-25 MG per tablet Take 1 tablet by mouth daily    [DISCONTINUED] rosuvastatin (CRESTOR) 10 MG tablet Take 1 tablet (10 mg total) by mouth daily    clotrimazole (LOTRIMIN)  "1 % cream Apply topically 2 (two) times a day (Patient not taking: Reported on 7/29/2024)    meloxicam (Mobic) 15 mg tablet Take 1 tablet (15 mg total) by mouth daily (Patient not taking: Reported on 10/21/2022)    ondansetron (ZOFRAN) 4 mg tablet take 1 tablet by mouth once daily if needed for nausea and vomiting (Patient not taking: Reported on 3/22/2023)    [DISCONTINUED] verapamil (Verelan) 240 MG 24 hr capsule Take 1 capsule (240 mg total) by mouth daily at bedtime (Patient not taking: Reported on 7/29/2024)     No current facility-administered medications on file prior to visit.     She is allergic to metformin and sulfa antibiotics..    Review of Systems   Constitutional:  Negative for chills and fever.   HENT:  Negative for congestion, ear pain and sore throat.    Eyes:  Negative for pain.   Respiratory:  Negative for cough and shortness of breath.    Cardiovascular:  Negative for chest pain and leg swelling.   Gastrointestinal:  Negative for abdominal pain, nausea and vomiting.   Endocrine: Negative for polyuria.   Genitourinary:  Negative for difficulty urinating, frequency and urgency.   Musculoskeletal:  Positive for arthralgias. Negative for back pain.   Skin:  Negative for rash.   Neurological:  Negative for weakness and headaches.   Psychiatric/Behavioral:  Negative for sleep disturbance. The patient is not nervous/anxious.          Objective:      /82 (BP Location: Left arm, Patient Position: Sitting, Cuff Size: Standard)   Pulse 97   Temp 98.3 °F (36.8 °C) (Temporal)   Ht 4' 10\" (1.473 m)   Wt 83 kg (183 lb)   SpO2 98%   BMI 38.25 kg/m²     Recent Results (from the past 1344 hour(s))    Diabetes Eye Exam    Collection Time: 06/19/24  3:58 PM   Result Value Ref Range    Right Eye Diabetic Retinopathy None     Left Eye Diabetic Retinopathy None         Physical Exam  Constitutional:       Appearance: Normal appearance.   HENT:      Head: Normocephalic.      Right Ear: External ear " normal.      Left Ear: External ear normal.      Nose: Nose normal. No congestion.      Mouth/Throat:      Mouth: Mucous membranes are moist.      Pharynx: Oropharynx is clear. No oropharyngeal exudate or posterior oropharyngeal erythema.   Eyes:      Extraocular Movements: Extraocular movements intact.      Conjunctiva/sclera: Conjunctivae normal.   Cardiovascular:      Rate and Rhythm: Normal rate and regular rhythm.      Heart sounds: Normal heart sounds. No murmur heard.  Pulmonary:      Effort: Pulmonary effort is normal.      Breath sounds: Normal breath sounds. No wheezing or rales.   Abdominal:      General: Abdomen is flat. There is no distension.      Palpations: Abdomen is soft.      Tenderness: There is no abdominal tenderness.   Musculoskeletal:      Cervical back: Normal range of motion and neck supple.      Right lower leg: No edema.      Left lower leg: No edema.      Comments: Left shoulder exam-lateral tenderness present, movement restricted and painful   Lymphadenopathy:      Cervical: No cervical adenopathy.   Skin:     General: Skin is warm.   Neurological:      General: No focal deficit present.      Mental Status: She is alert and oriented to person, place, and time.

## 2024-08-12 ENCOUNTER — HOSPITAL ENCOUNTER (OUTPATIENT)
Dept: RADIOLOGY | Facility: HOSPITAL | Age: 64
Discharge: HOME | End: 2024-08-12
Attending: INTERNAL MEDICINE
Payer: COMMERCIAL

## 2024-08-12 DIAGNOSIS — M12.812 ROTATOR CUFF ARTHROPATHY OF LEFT SHOULDER: ICD-10-CM

## 2024-08-12 PROCEDURE — 73221 MRI JOINT UPR EXTREM W/O DYE: CPT

## 2024-08-29 ENCOUNTER — OFFICE VISIT (OUTPATIENT)
Dept: OBGYN CLINIC | Facility: OTHER | Age: 64
End: 2024-08-29
Payer: COMMERCIAL

## 2024-08-29 VITALS
HEART RATE: 96 BPM | SYSTOLIC BLOOD PRESSURE: 134 MMHG | WEIGHT: 183 LBS | HEIGHT: 58 IN | BODY MASS INDEX: 38.41 KG/M2 | DIASTOLIC BLOOD PRESSURE: 79 MMHG

## 2024-08-29 DIAGNOSIS — M19.012 PRIMARY OSTEOARTHRITIS OF LEFT SHOULDER: Primary | ICD-10-CM

## 2024-08-29 DIAGNOSIS — M75.102 NONTRAUMATIC TEAR OF LEFT SUPRASPINATUS TENDON: ICD-10-CM

## 2024-08-29 PROCEDURE — 99204 OFFICE O/P NEW MOD 45 MIN: CPT | Performed by: ORTHOPAEDIC SURGERY

## 2024-08-29 PROCEDURE — 20610 DRAIN/INJ JOINT/BURSA W/O US: CPT | Performed by: ORTHOPAEDIC SURGERY

## 2024-08-29 RX ADMIN — BETAMETHASONE SODIUM PHOSPHATE AND BETAMETHASONE ACETATE 6 MG: 3; 3 INJECTION, SUSPENSION INTRA-ARTICULAR; INTRALESIONAL; INTRAMUSCULAR; SOFT TISSUE at 15:15

## 2024-08-29 RX ADMIN — BUPIVACAINE HYDROCHLORIDE 2 ML: 2.5 INJECTION, SOLUTION INFILTRATION; PERINEURAL at 15:15

## 2024-08-29 NOTE — PROGRESS NOTES
Assessment  Diagnoses and all orders for this visit:    Primary osteoarthritis of left shoulder    Nontraumatic tear of left supraspinatus tendon      Discussion and Plan:    The patient has imaging and an exam consistent with left shoulder osteoarthritis and supraspinatus tear.  I have discussed with the patient the pathophysiology of this diagnosis and reviewed how the examination correlates with this diagnosis.  Surgical vs conservative treatment options were discussed at length and after discussing these treatment options, the patient elected for a subacromial injection today.  Injection can be repeated in 4-6 mos if needed.  If this injection fails to provided relief we can consider GH injection.   Patient declined a referral to PT however will call if she wants a referral.   We did briefly discuss surgical options which include reverse total shoulder arthroplasty but clearly we would exhaust nonoperative measures before considering surgical intervention.  Patient was accompanied by family member who helped with interpretation and all questions were answered prior to the conclusion of the visit.    Subjective:   Patient ID: Michelle Rosa is a 64 y.o. female      HPI  The patient presents with a chief complaint of left shoulder pain.   The pain began 1-2 year(s) ago and is not associated with an acute injury. The patient describes the pain as aching, dull, and sharp in intensity,  intermittent in timing, and localizes the pain to the  left lateral shoulder.  The pain is worse with movement and relieved by rest.  The pain is not associated with numbness and tingling.  The pain is not associated with constitutional symptoms. The patient is awoken at night by the pain.     The patient completed PT in 2022 ordered by her PCP Dr. Jimenez.         The following portions of the patient's history were reviewed and updated as appropriate: allergies, current medications, past family history, past medical history, past social  "history, past surgical history and problem list.        Objective:  /79 (BP Location: Right arm, Patient Position: Sitting, Cuff Size: Adult)   Pulse 96   Ht 4' 10\" (1.473 m)   Wt 83 kg (183 lb)   BMI 38.25 kg/m²       Left Shoulder Exam     Range of Motion   External rotation:  40   Forward flexion:  110 (PROM 160)     Muscle Strength   Abduction: 4/5   External rotation: 4/5     Other   Erythema: absent  Sensation: normal  Pulse: present             Physical Exam  Vitals reviewed.   Constitutional:       Appearance: She is well-developed.   Eyes:      Pupils: Pupils are equal, round, and reactive to light.   Pulmonary:      Effort: Pulmonary effort is normal.      Breath sounds: Normal breath sounds.   Abdominal:      General: Abdomen is flat. There is no distension.   Skin:     General: Skin is warm and dry.   Neurological:      Mental Status: She is alert and oriented to person, place, and time.   Psychiatric:         Behavior: Behavior normal.         Thought Content: Thought content normal.         Judgment: Judgment normal.         Large joint arthrocentesis: L subacromial bursa  [place] Protocol:  procedure performed by consultantConsent: Verbal consent obtained.  Consent given by: patient  Patient understanding: patient states understanding of the procedure being performed  Site marked: the operative site was marked  Patient identity confirmed: verbally with patient  Supporting Documentation  Indications: pain   Procedure Details  Location: shoulder - L subacromial bursa  Needle size: 22 G  Ultrasound guidance: no  Approach: lateral  Medications administered: 2 mL bupivacaine 0.25 %; 6 mg betamethasone acetate-betamethasone sodium phosphate 6 (3-3) mg/mL    Patient tolerance: patient tolerated the procedure well with no immediate complications  Dressing:  Sterile dressing applied           I have personally reviewed pertinent films in PACS and my interpretation is as follows.    MRI left " shoulder demonstrates moderate-severe GH degenerative changes, moderate AC OA,  partial thickness supraspinatus tear.     Scribe Attestation      I,:  Lizz Reid MA am acting as a scribe while in the presence of the attending physician.:       I,:  Phi Elder MD personally performed the services described in this documentation    as scribed in my presence.:

## 2024-08-30 PROBLEM — M75.102 NONTRAUMATIC TEAR OF LEFT SUPRASPINATUS TENDON: Status: ACTIVE | Noted: 2024-08-30

## 2024-08-30 RX ORDER — BETAMETHASONE SODIUM PHOSPHATE AND BETAMETHASONE ACETATE 3; 3 MG/ML; MG/ML
6 INJECTION, SUSPENSION INTRA-ARTICULAR; INTRALESIONAL; INTRAMUSCULAR; SOFT TISSUE
Status: COMPLETED | OUTPATIENT
Start: 2024-08-29 | End: 2024-08-29

## 2024-08-30 RX ORDER — BUPIVACAINE HYDROCHLORIDE 2.5 MG/ML
2 INJECTION, SOLUTION INFILTRATION; PERINEURAL
Status: COMPLETED | OUTPATIENT
Start: 2024-08-29 | End: 2024-08-29

## 2024-09-08 ENCOUNTER — HOSPITAL ENCOUNTER (INPATIENT)
Facility: HOSPITAL | Age: 64
LOS: 3 days | Discharge: HOME | DRG: 872 | End: 2024-09-11
Attending: EMERGENCY MEDICINE | Admitting: INTERNAL MEDICINE
Payer: COMMERCIAL

## 2024-09-08 ENCOUNTER — APPOINTMENT (EMERGENCY)
Dept: RADIOLOGY | Facility: HOSPITAL | Age: 64
DRG: 872 | End: 2024-09-08
Payer: COMMERCIAL

## 2024-09-08 DIAGNOSIS — N12 PYELONEPHRITIS: ICD-10-CM

## 2024-09-08 DIAGNOSIS — E11.65 UNCONTROLLED TYPE 2 DIABETES MELLITUS WITH HYPERGLYCEMIA (HCC): ICD-10-CM

## 2024-09-08 DIAGNOSIS — A41.9 SEPSIS (HCC): ICD-10-CM

## 2024-09-08 DIAGNOSIS — N39.0 UTI (URINARY TRACT INFECTION): Primary | ICD-10-CM

## 2024-09-08 LAB
ALBUMIN SERPL BCG-MCNC: 3.9 G/DL (ref 3.5–5)
ALP SERPL-CCNC: 79 U/L (ref 34–104)
ALT SERPL W P-5'-P-CCNC: 20 U/L (ref 7–52)
ANION GAP SERPL CALCULATED.3IONS-SCNC: 10 MMOL/L (ref 4–13)
APTT PPP: 25 SECONDS (ref 23–34)
AST SERPL W P-5'-P-CCNC: 16 U/L (ref 13–39)
BACTERIA UR QL AUTO: ABNORMAL /HPF
BASOPHILS # BLD AUTO: 0.02 THOUSANDS/ÂΜL (ref 0–0.1)
BASOPHILS NFR BLD AUTO: 0 % (ref 0–1)
BILIRUB SERPL-MCNC: 0.73 MG/DL (ref 0.2–1)
BILIRUB UR QL STRIP: NEGATIVE
BUN SERPL-MCNC: 11 MG/DL (ref 5–25)
CALCIUM SERPL-MCNC: 9.7 MG/DL (ref 8.4–10.2)
CHLORIDE SERPL-SCNC: 98 MMOL/L (ref 96–108)
CLARITY UR: ABNORMAL
CO2 SERPL-SCNC: 29 MMOL/L (ref 21–32)
COLOR UR: ABNORMAL
CREAT SERPL-MCNC: 0.76 MG/DL (ref 0.6–1.3)
EOSINOPHIL # BLD AUTO: 0.02 THOUSAND/ÂΜL (ref 0–0.61)
EOSINOPHIL NFR BLD AUTO: 0 % (ref 0–6)
ERYTHROCYTE [DISTWIDTH] IN BLOOD BY AUTOMATED COUNT: 12 % (ref 11.6–15.1)
GFR SERPL CREATININE-BSD FRML MDRD: 82 ML/MIN/1.73SQ M
GLUCOSE SERPL-MCNC: 231 MG/DL (ref 65–140)
GLUCOSE SERPL-MCNC: 275 MG/DL (ref 65–140)
GLUCOSE SERPL-MCNC: 282 MG/DL (ref 65–140)
GLUCOSE SERPL-MCNC: 342 MG/DL (ref 65–140)
GLUCOSE UR STRIP-MCNC: NEGATIVE MG/DL
HCT VFR BLD AUTO: 41.4 % (ref 34.8–46.1)
HGB BLD-MCNC: 13.9 G/DL (ref 11.5–15.4)
HGB UR QL STRIP.AUTO: ABNORMAL
IMM GRANULOCYTES # BLD AUTO: 0.07 THOUSAND/UL (ref 0–0.2)
IMM GRANULOCYTES NFR BLD AUTO: 1 % (ref 0–2)
INR PPP: 1.04 (ref 0.85–1.19)
KETONES UR STRIP-MCNC: NEGATIVE MG/DL
LACTATE SERPL-SCNC: 1 MMOL/L (ref 0.5–2)
LACTATE SERPL-SCNC: 2.3 MMOL/L (ref 0.5–2)
LEUKOCYTE ESTERASE UR QL STRIP: ABNORMAL
LIPASE SERPL-CCNC: 12 U/L (ref 11–82)
LYMPHOCYTES # BLD AUTO: 1.38 THOUSANDS/ÂΜL (ref 0.6–4.47)
LYMPHOCYTES NFR BLD AUTO: 12 % (ref 14–44)
MCH RBC QN AUTO: 29.1 PG (ref 26.8–34.3)
MCHC RBC AUTO-ENTMCNC: 33.6 G/DL (ref 31.4–37.4)
MCV RBC AUTO: 87 FL (ref 82–98)
MONOCYTES # BLD AUTO: 0.55 THOUSAND/ÂΜL (ref 0.17–1.22)
MONOCYTES NFR BLD AUTO: 5 % (ref 4–12)
NEUTROPHILS # BLD AUTO: 9.25 THOUSANDS/ÂΜL (ref 1.85–7.62)
NEUTS SEG NFR BLD AUTO: 82 % (ref 43–75)
NITRITE UR QL STRIP: NEGATIVE
NON-SQ EPI CELLS URNS QL MICRO: ABNORMAL /HPF
NRBC BLD AUTO-RTO: 0 /100 WBCS
PH UR STRIP.AUTO: 5.5 [PH]
PLATELET # BLD AUTO: 182 THOUSANDS/UL (ref 149–390)
PMV BLD AUTO: 9 FL (ref 8.9–12.7)
POTASSIUM SERPL-SCNC: 3.4 MMOL/L (ref 3.5–5.3)
PROCALCITONIN SERPL-MCNC: 0.5 NG/ML
PROT SERPL-MCNC: 7.4 G/DL (ref 6.4–8.4)
PROT UR STRIP-MCNC: ABNORMAL MG/DL
PROTHROMBIN TIME: 13.9 SECONDS (ref 12.3–15)
RBC # BLD AUTO: 4.77 MILLION/UL (ref 3.81–5.12)
RBC #/AREA URNS AUTO: ABNORMAL /HPF
SODIUM SERPL-SCNC: 137 MMOL/L (ref 135–147)
SP GR UR STRIP.AUTO: 1.01 (ref 1–1.03)
UROBILINOGEN UR STRIP-ACNC: <2 MG/DL
WBC # BLD AUTO: 11.29 THOUSAND/UL (ref 4.31–10.16)
WBC #/AREA URNS AUTO: ABNORMAL /HPF

## 2024-09-08 PROCEDURE — 96361 HYDRATE IV INFUSION ADD-ON: CPT

## 2024-09-08 PROCEDURE — 84145 PROCALCITONIN (PCT): CPT

## 2024-09-08 PROCEDURE — 74177 CT ABD & PELVIS W/CONTRAST: CPT

## 2024-09-08 PROCEDURE — 87040 BLOOD CULTURE FOR BACTERIA: CPT

## 2024-09-08 PROCEDURE — 83036 HEMOGLOBIN GLYCOSYLATED A1C: CPT

## 2024-09-08 PROCEDURE — 71046 X-RAY EXAM CHEST 2 VIEWS: CPT

## 2024-09-08 PROCEDURE — 99285 EMERGENCY DEPT VISIT HI MDM: CPT

## 2024-09-08 PROCEDURE — 87086 URINE CULTURE/COLONY COUNT: CPT

## 2024-09-08 PROCEDURE — 80053 COMPREHEN METABOLIC PANEL: CPT

## 2024-09-08 PROCEDURE — 93005 ELECTROCARDIOGRAM TRACING: CPT

## 2024-09-08 PROCEDURE — 87186 SC STD MICRODIL/AGAR DIL: CPT

## 2024-09-08 PROCEDURE — 82948 REAGENT STRIP/BLOOD GLUCOSE: CPT

## 2024-09-08 PROCEDURE — 83690 ASSAY OF LIPASE: CPT

## 2024-09-08 PROCEDURE — 36415 COLL VENOUS BLD VENIPUNCTURE: CPT

## 2024-09-08 PROCEDURE — 85730 THROMBOPLASTIN TIME PARTIAL: CPT

## 2024-09-08 PROCEDURE — 85610 PROTHROMBIN TIME: CPT

## 2024-09-08 PROCEDURE — 81001 URINALYSIS AUTO W/SCOPE: CPT

## 2024-09-08 PROCEDURE — 87077 CULTURE AEROBIC IDENTIFY: CPT

## 2024-09-08 PROCEDURE — 83605 ASSAY OF LACTIC ACID: CPT

## 2024-09-08 PROCEDURE — 85025 COMPLETE CBC W/AUTO DIFF WBC: CPT

## 2024-09-08 PROCEDURE — 87147 CULTURE TYPE IMMUNOLOGIC: CPT

## 2024-09-08 PROCEDURE — 99291 CRITICAL CARE FIRST HOUR: CPT | Performed by: EMERGENCY MEDICINE

## 2024-09-08 PROCEDURE — 87154 CUL TYP ID BLD PTHGN 6+ TRGT: CPT

## 2024-09-08 PROCEDURE — 96374 THER/PROPH/DIAG INJ IV PUSH: CPT

## 2024-09-08 RX ORDER — ACETAMINOPHEN 325 MG/1
975 TABLET ORAL ONCE
Status: COMPLETED | OUTPATIENT
Start: 2024-09-08 | End: 2024-09-08

## 2024-09-08 RX ORDER — ACETAMINOPHEN 325 MG/1
650 TABLET ORAL EVERY 6 HOURS PRN
Status: DISCONTINUED | OUTPATIENT
Start: 2024-09-08 | End: 2024-09-08

## 2024-09-08 RX ORDER — INSULIN LISPRO 100 [IU]/ML
1-6 INJECTION, SOLUTION INTRAVENOUS; SUBCUTANEOUS
Status: DISCONTINUED | OUTPATIENT
Start: 2024-09-08 | End: 2024-09-11 | Stop reason: HOSPADM

## 2024-09-08 RX ORDER — ACETAMINOPHEN 325 MG/1
650 TABLET ORAL EVERY 6 HOURS PRN
Status: DISCONTINUED | OUTPATIENT
Start: 2024-09-08 | End: 2024-09-11 | Stop reason: HOSPADM

## 2024-09-08 RX ORDER — PRAVASTATIN SODIUM 80 MG/1
80 TABLET ORAL
Status: DISCONTINUED | OUTPATIENT
Start: 2024-09-09 | End: 2024-09-11 | Stop reason: HOSPADM

## 2024-09-08 RX ORDER — SODIUM CHLORIDE, SODIUM GLUCONATE, SODIUM ACETATE, POTASSIUM CHLORIDE, MAGNESIUM CHLORIDE, SODIUM PHOSPHATE, DIBASIC, AND POTASSIUM PHOSPHATE .53; .5; .37; .037; .03; .012; .00082 G/100ML; G/100ML; G/100ML; G/100ML; G/100ML; G/100ML; G/100ML
1000 INJECTION, SOLUTION INTRAVENOUS ONCE
Status: COMPLETED | OUTPATIENT
Start: 2024-09-08 | End: 2024-09-08

## 2024-09-08 RX ORDER — ONDANSETRON 2 MG/ML
4 INJECTION INTRAMUSCULAR; INTRAVENOUS EVERY 6 HOURS PRN
Status: DISCONTINUED | OUTPATIENT
Start: 2024-09-08 | End: 2024-09-11 | Stop reason: HOSPADM

## 2024-09-08 RX ORDER — ENOXAPARIN SODIUM 100 MG/ML
40 INJECTION SUBCUTANEOUS DAILY
Status: DISCONTINUED | OUTPATIENT
Start: 2024-09-08 | End: 2024-09-11 | Stop reason: HOSPADM

## 2024-09-08 RX ORDER — ONDANSETRON 2 MG/ML
INJECTION INTRAMUSCULAR; INTRAVENOUS
Status: COMPLETED
Start: 2024-09-08 | End: 2024-09-08

## 2024-09-08 RX ORDER — ONDANSETRON 2 MG/ML
4 INJECTION INTRAMUSCULAR; INTRAVENOUS ONCE
Status: COMPLETED | OUTPATIENT
Start: 2024-09-08 | End: 2024-09-08

## 2024-09-08 RX ADMIN — INSULIN LISPRO 3 UNITS: 100 INJECTION, SOLUTION INTRAVENOUS; SUBCUTANEOUS at 18:00

## 2024-09-08 RX ADMIN — SODIUM CHLORIDE 1000 ML: 0.9 INJECTION, SOLUTION INTRAVENOUS at 12:09

## 2024-09-08 RX ADMIN — ACETAMINOPHEN 975 MG: 325 TABLET ORAL at 12:09

## 2024-09-08 RX ADMIN — SODIUM CHLORIDE 1000 ML: 0.9 INJECTION, SOLUTION INTRAVENOUS at 14:34

## 2024-09-08 RX ADMIN — ENOXAPARIN SODIUM 40 MG: 40 INJECTION SUBCUTANEOUS at 16:31

## 2024-09-08 RX ADMIN — ACETAMINOPHEN 650 MG: 325 TABLET ORAL at 23:31

## 2024-09-08 RX ADMIN — INSULIN LISPRO 5 UNITS: 100 INJECTION, SOLUTION INTRAVENOUS; SUBCUTANEOUS at 21:55

## 2024-09-08 RX ADMIN — SODIUM CHLORIDE, SODIUM GLUCONATE, SODIUM ACETATE, POTASSIUM CHLORIDE, MAGNESIUM CHLORIDE, SODIUM PHOSPHATE, DIBASIC, AND POTASSIUM PHOSPHATE 1000 ML: .53; .5; .37; .037; .03; .012; .00082 INJECTION, SOLUTION INTRAVENOUS at 16:33

## 2024-09-08 RX ADMIN — IOHEXOL 100 ML: 350 INJECTION, SOLUTION INTRAVENOUS at 13:02

## 2024-09-08 RX ADMIN — ONDANSETRON 4 MG: 2 INJECTION INTRAMUSCULAR; INTRAVENOUS at 11:37

## 2024-09-08 RX ADMIN — CEFTRIAXONE SODIUM 1000 MG: 10 INJECTION, POWDER, FOR SOLUTION INTRAVENOUS at 18:28

## 2024-09-08 RX ADMIN — PIPERACILLIN SODIUM AND TAZOBACTAM SODIUM 4.5 G: 36; 4.5 INJECTION, POWDER, LYOPHILIZED, FOR SOLUTION INTRAVENOUS at 14:34

## 2024-09-08 NOTE — ED ATTENDING ATTESTATION
9/8/2024  I, Elizabeth Suazo MD, saw and evaluated the patient. I have discussed the patient with the resident/non-physician practitioner and agree with the resident's/non-physician practitioner's findings, Plan of Care, and MDM as documented in the resident's/non-physician practitioner's note, except where noted. All available labs and Radiology studies were reviewed.  I was present for key portions of any procedure(s) performed by the resident/non-physician practitioner and I was immediately available to provide assistance.       At this point I agree with the current assessment done in the Emergency Department.  I have conducted an independent evaluation of this patient a history and physical is as follows:  64-year-old female with vomiting, abdominal pain, unable to take p.o., and fever since Monday.  Patient had diarrhea for single day, but otherwise has been having copious vomiting.  She also complains of abdominal distention and allover abdominal pain.  Patient has had fever as high as 103.  She has been having chills.  She denies runny nose, cough, chest pain, or shortness of breath.  She denies lateralizing limb swelling.  Her surgical history includes cholecystectomy which was remote.  She has not noticed yellowing of her skin.  She is still making urine and does not have urinary symptoms.  Her review of systems otherwise negative in 12 systems reviewed.  On exam the patient is ill-appearing.  She is lying quietly with her eyes closed.  Her mucous membranes are dry.  His car conjunctiva are pink.  She has no scleral icterus.  Her neck is supple with no subcutaneous air.  Her heart is tachycardic at about 125 with no murmurs, rubs, or gallops.  Her lungs are clear with good air movement.  Her skin is hot to touch.  Her abdomen is distended and diffusely tender with no rebound, guarding, or masses.  She has no stigmata of embolic phenomenon.  The patient was completely exposed.  She does not  lateralizing edema.  She has no evidence of skin breakdown.MEDICAL DECISION MAKING    Number and Complexity of Problems  Differential diagnosis: Sepsis, dehydration, abdominal pain, concern for intra-abdominal surgical pathology, bowel obstruction    Medical Decision Making Data  External documents reviewed: Patient has remote history of sarcoidosis.  She also has rheumatoid arthritis.  She is not currently on immunosuppression  My EKG interpretation: Sinus tachycardia, narrow complex, no [place] depressions or ST elevations  My CT interpretation: Patient with a air in the bladder with no catheterization history  My X-ray interpretation: No acute cardiopulmonary process  My ultrasound interpretation:     CT abdomen pelvis with contrast   Final Result   1.  Area of hypoattenuation in the midpole of the left kidney may represent pyelonephritis or a lesion. Correlation with urinalysis advised. Follow-up contrast-enhanced CT with renal protocol or MRI with intravenous contrast is advised in 2 months to    assess for a lesion.   2.  Punctate focus of air is considered abnormal in the absence of recent catheterization, and can represent infection. Correlation with urinalysis is advised.   3.  No acute gastroenterologic pathology.      The study was marked in EPIC for immediate notification.         Workstation performed: [id number]         XR chest pa & lateral    (Results Pending)       Labs Reviewed   CBC AND DIFFERENTIAL - Abnormal       Result Value Ref Range Status    WBC 11.29 (*) 4.31 - 10.16 Thousand/uL Final    RBC 4.77  3.81 - 5.12 Million/uL Final    Hemoglobin 13.9  11.5 - 15.4 g/dL Final    Hematocrit 41.4  34.8 - 46.1 % Final    MCV 87  82 - 98 fL Final    MCH 29.1  26.8 - 34.3 pg Final    MCHC 33.6  31.4 - 37.4 g/dL Final    RDW 12.0  11.6 - 15.1 % Final    MPV 9.0  8.9 - 12.7 fL Final    Platelets 182  149 - 390 Thousands/uL Final    nRBC 0  /100 WBCs Final    Segmented % 82 (*) 43 - 75 % Final    Immature  Grans % 1  0 - 2 % Final    Lymphocytes % 12 (*) 14 - 44 % Final    Monocytes % 5  4 - 12 % Final    Eosinophils Relative 0  0 - 6 % Final    Basophils Relative 0  0 - 1 % Final    Absolute Neutrophils 9.25 (*) 1.85 - 7.62 Thousands/µL Final    Absolute Immature Grans 0.07  0.00 - 0.20 Thousand/uL Final    Absolute Lymphocytes 1.38  0.60 - 4.47 Thousands/µL Final    Absolute Monocytes 0.55  0.17 - 1.22 Thousand/µL Final    Eosinophils Absolute 0.02  0.00 - 0.61 Thousand/µL Final    Basophils Absolute 0.02  0.00 - 0.10 Thousands/µL Final   COMPREHENSIVE METABOLIC PANEL - Abnormal    Sodium 137  135 - 147 mmol/L Final    Potassium 3.4 (*) 3.5 - 5.3 mmol/L Final    Chloride 98  96 - 108 mmol/L Final    CO2 29  21 - 32 mmol/L Final    ANION GAP 10  4 - 13 mmol/L Final    BUN 11  5 - 25 mg/dL Final    Creatinine 0.76  0.60 - 1.30 mg/dL Final    Comment: Standardized to IDMS reference method    Glucose 275 (*) 65 - 140 mg/dL Final    Comment: If the patient is fasting, the ADA then defines impaired fasting glucose as > 100 mg/dL and diabetes as > or equal to 123 mg/dL.    Calcium 9.7  8.4 - 10.2 mg/dL Final    AST 16  13 - 39 U/L Final    ALT 20  7 - 52 U/L Final    Comment: Specimen collection should occur prior to Sulfasalazine administration due to the potential for falsely depressed results.     Alkaline Phosphatase 79  34 - 104 U/L Final    Total Protein 7.4  6.4 - 8.4 g/dL Final    Albumin 3.9  3.5 - 5.0 g/dL Final    Total Bilirubin 0.73  0.20 - 1.00 mg/dL Final    Comment: Use of this assay is not recommended for patients undergoing treatment with eltrombopag due to the potential for falsely elevated results.  N-acetyl-p-benzoquinone imine (metabolite of Acetaminophen) will generate erroneously low results in samples for patients that have taken an overdose of Acetaminophen.    eGFR 82  ml/min/1.73sq m Final    Narrative:     [employer] guidelines for Chronic Kidney Disease (CKD):     Stage  1 with normal or high GFR (GFR > 90 mL/min/1.73 square meters)    Stage 2 Mild CKD (GFR = 60-89 mL/min/1.73 square meters)    Stage 3A Moderate CKD (GFR = 45-59 mL/min/1.73 square meters)    Stage 3B Moderate CKD (GFR = 30-44 mL/min/1.73 square meters)    Stage 4 Severe CKD (GFR = 15-29 mL/min/1.73 square meters)    Stage 5 End Stage CKD (GFR <15 mL/min/1.73 square meters)  Note: GFR calculation is accurate only with a steady state creatinine   LACTIC ACID, PLASMA (W/REFLEX IF RESULT > 2.0) - Abnormal    LACTIC ACID 2.3 (*) 0.5 - 2.0 mmol/L Final    Narrative:     Result may be elevated if tourniquet was used during collection.   PROCALCITONIN TEST - Abnormal    Procalcitonin 0.50 (*) <=0.25 ng/ml Final    Comment: Suspected Lower Respiratory Tract Infection (LRTI):  - LESS than or EQUAL to 0.25 ng/mL:   low likelihood for bacterial LRTI; antibiotics DISCOURAGED.  - GREATER than 0.25 ng/mL:   increased likelihood for bacterial LRTI; antibiotics ENCOURAGED.    Suspected Sepsis:  - Strongly consider initiating antibiotics in ALL UNSTABLE patients.  - LESS than or EQUAL to 0.5 ng/mL:   low likelihood for bacterial sepsis; antibiotics DISCOURAGED.  - GREATER than 0.5 ng/mL:   increased likelihood for bacterial sepsis; antibiotics ENCOURAGED.  - GREATER than 2 ng/mL:   high risk for severe sepsis / septic shock; antibiotics strongly ENCOURAGED.    Decisions on antibiotic use should not be based solely on Procalcitonin (PCT) levels. If PCT is low but uncertainty exists with stopping antibiotics, repeat PCT in 6-24 hours to confirm the low level. If antibiotics are administered (regardless if initial PCT was high or low), repeat PCT every 1-2 days to consider early antibiotic cessation (when GREATER than 80% decrease from the peak OR when PCT drops below designated cutoffs, whichever comes first), so long as the infection is NOT one that typically requires prolonged treatment durations (e.g., bone/joint infections,  endocarditis, Staph. aureus bacteremia).    Situations of FALSE-POSITIVE Procalcitonin values:  1) Newborns < 72 hours old  2) Massive stress from severe trauma / burns, major surgery, acute pancreatitis, cardiogenic / hemorrhagic shock, sickle cell crisis, or other organ perfusion abnormalities  3) Malaria and some Candidal infections  4) Treatment with agents that stimulate cytokines (e.g., OKT3, anti-lymphocyte globulins, alemtuzumab, IL-2, granulocyte transfusion [NOT GCSFs])  5) Chronic renal disease causes elevated baseline levels (consider GREATER than 0.75 ng/mL as an abnormal cut-off); initiating HD/CRRT may cause transient decreases  6) Paraneoplastic syndromes from medullary thyroid or SCLC, some forms of vasculitis, and acute awwfr-bu-[id number] disease    Situations of FALSE-NEGATIVE Procalcitonin values:  1) Too early in clinical course for PCT to have reached its peak (may repeat in 6-24 hours to confirm low level)  2) Localized infection WITHOUT systemic (SIRS / sepsis) response (e.g., an abscess, osteomyelitis, cystitis)  3) Mycobacteria (e.g., Tuberculosis, MAC)  4) Cystic fibrosis exacerbations     UA W REFLEX TO MICROSCOPIC WITH REFLEX TO CULTURE - Abnormal    Color, UA Light Yellow   Final    Clarity, UA Turbid   Final    Specific Gravity, UA 1.015  1.003 - 1.030 Final    pH, UA 5.5  4.5, 5.0, 5.5, 6.0, 6.5, 7.0, 7.5, 8.0 Final    Leukocytes, UA Large (*) Negative Final    Nitrite, UA Negative  Negative Final    Protein, UA 30 (1+) (*) Negative mg/dl Final    Glucose, UA Negative  Negative mg/dl Final    Ketones, UA Negative  Negative mg/dl Final    Urobilinogen, UA <2.0  <2.0 mg/dl mg/dl Final    Bilirubin, UA Negative  Negative Final    Occult Blood, UA Moderate (*) Negative Final   URINE MICROSCOPIC - Abnormal    RBC, UA 4-10 (*) None Seen, 1-2 /hpf Final    WBC, UA Innumerable (*) None Seen, 1-2 /hpf Final    Epithelial Cells Occasional  None Seen, Occasional /hpf Final    Bacteria, UA Moderate  (*) None Seen, Occasional /hpf Final   POCT GLUCOSE - Abnormal    POC Glucose 282 (*) 65 - 140 mg/dl Final   PROTIME-INR - Normal    Protime 13.9  12.3 - 15.0 seconds Final    INR 1.04  0.85 - 1.19 Final    Narrative:     INR Therapeutic Range    Indication                                             INR Range      Atrial Fibrillation                                               2.0-3.0  Hypercoagulable State                                    2.0.2.3  Left Ventricular Asist Device                            2.0-3.0  Mechanical Heart Valve                                  -    Aortic(with afib, MI, embolism, HF, LA enlargement,    and/or coagulopathy)                                     2.0-3.0 (2.5-3.5)     Mitral                                                             2.5-3.5  Prosthetic/Bioprosthetic Heart Valve               2.0-3.0  Venous thromboembolism (VTE: VT, PE        2.0-3.0   APTT - Normal    PTT 25  23 - 34 seconds Final    Comment: Therapeutic Heparin Range =  60-90 seconds   LIPASE - Normal    Lipase 12  11 - 82 u/L Final   BLOOD CULTURE   BLOOD CULTURE   URINE CULTURE   LACTIC ACID 2 HOUR       Labs reviewed by me are significant for: Elevated white blood cell count, elevated lactate, urinalysis consistent with infection    Clinical decision rules/scores are significant for:     Discussed case with:   Considered admission for: Sepsis with urinary source, vomiting, dehydration, lactic acidosis    Treatment and Disposition  ED course: Patient seen and examined.  She was sepsis evaluation, IV fluids, will CT abdomen, anticipate admission to the hospital for sepsis  Shared decision making:   Code status:     ED Course         Critical Care Time  CriticalCare Time    Date/Time: 9/8/2024 12:12 PM    Performed by: Elizabeth Suazo MD  Authorized by: Elizabeth Suazo MD    Critical care provider statement:     Critical care time (minutes):  45    Critical care time was exclusive of:   Separately billable procedures and treating other patients and teaching time    Critical care was necessary to treat or prevent imminent or life-threatening deterioration of the following conditions:  Sepsis    Critical care was time spent personally by me on the following activities:  Blood draw for specimens, obtaining history from patient or surrogate, development of treatment plan with patient or surrogate, discussions with consultants, discussions with primary provider, evaluation of patient's response to treatment, examination of patient, review of old charts, re-evaluation of patient's condition, ordering and review of radiographic studies, ordering and review of laboratory studies and ordering and performing treatments and interventions

## 2024-09-08 NOTE — ASSESSMENT & PLAN NOTE
Home regimen losartan-hydrochlorothiazide 100-25 mg daily   Verapamil 240 mg daily    Plan:   - Holding all hypertensive medications while patient is septic

## 2024-09-08 NOTE — SEPSIS NOTE
"  Sepsis Note   Michelle Rosa 64 y.o. female MRN: [id number]  Unit/Bed#: ED 27 Encounter: [id number]       Initial Sepsis Screening       Row Name 09/08/24 1259                Is the patient's history suggestive of a new or worsening infection? Yes (Proceed)  -BM        Suspected source of infection acute abdominal infection  -BM        Indicate SIRS criteria Hyperthemia > 38.3C (100.9F) OR Hypothermia <36C (96.8F);Tachycardia > 90 bpm  -BM        Are two or more of the above signs & symptoms of infection both present and new to the patient? Yes (Proceed)  -BM        Assess for evidence of organ dysfunction: Are any of the below criteria present within 6 hours of suspected infection and SIRS criteria that are NOT considered to be chronic conditions? Lactate > 2.0  -BM        Date of presentation of severe sepsis 09/08/24  -BM        Time of presentation of severe sepsis 1300  -BM        Sepsis Note: Click \"NEXT\" below (NOT \"close\") to generate sepsis note based on above information. YES (proceed by clicking \"NEXT\")  -BM                  User Key  (r) = Recorded By, (t) = Taken By, (c) = Cosigned By      Initials Name Provider Type    BM Nahun Jackson MD Resident                        Body mass index is 37.2 kg/m².  Wt Readings from Last 1 Encounters:   09/08/24 80.7 kg (178 lb)     IBW (Ideal Body Weight): 40.9 kg    Ideal body weight: 47.1 kg (103 lb 14.5 oz)  Adjusted ideal body weight: 60.6 kg (133 lb 8.7 oz)    "

## 2024-09-08 NOTE — ASSESSMENT & PLAN NOTE
CT A/P with contrast: Area of hypoattenuation in the midpole of the left kidney may represent pyelonephritis or a lesion. Correlation with urinalysis advised. Follow-up contrast-enhanced CT with renal protocol or MRI with intravenous contrast is advised in 2 months to assess for a lesion.  Patient has a one week history of fever and nausea/vomiting, denies urinary symptoms. Denies dysuria, increased urinary frequency, hematuria. UA positive for bacteria, WBC, and leukocytes. Pt has no recorded history of prior UTI or urine cultures.    Plan:   Ucx growing E. Coli, plan to treat for total 10-day course and transition to cefpodoxime on discharge tomorrow  Cont. Ceftriaxone

## 2024-09-08 NOTE — ED PROVIDER NOTES
History  Chief Complaint   Patient presents with    Vomiting     Per pt she has been vomiting, diarrhea and having fevers since this past Monday      Patient is a 64-year-old female with past medical history of diabetes, hypercholesterolemia, sarcoidosis, rheumatoid arthritis presenting for vomiting and fever for the last week.  Patient has had significant vomiting throughout the week and not able to eat or drink much.  She had 1 episode of diarrhea throughout the week.  Her fever has been 101 to 103 °F consistently.  She ate goat last weekend prior to the symptoms and thinks it may be related to that.  She has not had any cough, congestion, runny nose, chest pain, shortness of breath, abdominal pain, urinary symptoms, numbness, tingling, or weakness.        Prior to Admission Medications   Prescriptions Last Dose Informant Patient Reported? Taking?   Insulin Pen Needle 29G X 10MM MISC   No No   Sig: Use once a week   UltiCare Mini Pen Needles 31G X 6 MM MISC  Self Yes No   clotrimazole (LOTRIMIN) 1 % cream   No No   Sig: Apply topically 2 (two) times a day   Patient not taking: Reported on 7/29/2024   dulaglutide (Trulicity) 3 MG/0.5ML injection   No No   Sig: Inject 0.5 mL (3 mg total) under the skin once a week   glipiZIDE (GLUCOTROL) 5 mg tablet   No No   Sig: Take 1 tablet (5 mg total) by mouth daily with breakfast   losartan-hydrochlorothiazide (HYZAAR) 100-25 MG per tablet   No No   Sig: Take 1 tablet by mouth daily   meloxicam (Mobic) 15 mg tablet  Self No No   Sig: Take 1 tablet (15 mg total) by mouth daily   Patient not taking: Reported on 10/21/2022   ondansetron (ZOFRAN) 4 mg tablet  Self Yes No   Sig: take 1 tablet by mouth once daily if needed for nausea and vomiting   Patient not taking: Reported on 3/22/2023   rosuvastatin (CRESTOR) 10 MG tablet   No No   Sig: Take 1 tablet (10 mg total) by mouth daily   verapamil (Verelan) 240 MG 24 hr capsule   No No   Sig: Take 1 capsule (240 mg total) by mouth  daily at bedtime   vitamin B-12 (VITAMIN B-12) 1,000 mcg tablet   No No   Sig: Take 1 tablet (1,000 mcg total) by mouth daily      Facility-Administered Medications: None       Past Medical History:   Diagnosis Date    Diabetes mellitus (HCC)     Hypercholesterolemia     Hypertension     Rheumatoid arthritis (HCC)     Sarcoidosis     Sepsis (HCC) 09/08/2024       Past Surgical History:   Procedure Laterality Date    CHOLECYSTECTOMY      COLONOSCOPY  05/15/2009    Abnormal     KNEE SURGERY Left 10/2022    MAMMO (HISTORICAL)  09/30/2016    Negative        Family History   Problem Relation Age of Onset    Diabetes Mother     Coronary artery disease Father     No Known Problems Sister     No Known Problems Sister     No Known Problems Sister     No Known Problems Daughter     No Known Problems Daughter     No Known Problems Daughter     Diabetes Maternal Grandmother     Stomach cancer Maternal Grandfather     No Known Problems Paternal Grandmother     No Known Problems Son     No Known Problems Son     No Known Problems Son     No Known Problems Maternal Aunt     No Known Problems Maternal Aunt     No Known Problems Maternal Aunt     No Known Problems Paternal Aunt     No Known Problems Paternal Aunt      I have reviewed and agree with the history as documented.    E-Cigarette/Vaping    E-Cigarette Use Never User      E-Cigarette/Vaping Substances    Nicotine No     THC No     CBD No     Flavoring No     Other No     Unknown No      Social History     Tobacco Use    Smoking status: Never    Smokeless tobacco: Never   Vaping Use    Vaping status: Never Used   Substance Use Topics    Alcohol use: Not Currently    Drug use: Never        Review of Systems    Physical Exam  ED Triage Vitals   Temperature Pulse Respirations Blood Pressure SpO2   09/08/24 1110 09/08/24 1110 09/08/24 1110 09/08/24 1110 09/08/24 1110   (!) 103 °F (39.4 °C) (!) 133 20 120/85 98 %      Temp Source Heart Rate Source Patient Position - Orthostatic  VS BP Location FiO2 (%)   09/08/24 1110 09/08/24 1110 09/08/24 1110 09/08/24 1110 --   Oral Monitor Sitting Left arm       Pain Score       09/08/24 1107       10 - Worst Possible Pain             Orthostatic Vital Signs  Vitals:    09/10/24 0730 09/10/24 1548 09/10/24 2315 09/11/24 0735   BP: 130/66 130/68 130/70 139/74   Pulse: 90 79 81 87   Patient Position - Orthostatic VS:           Physical Exam  Vitals and nursing note reviewed.   Constitutional:       General: She is not in acute distress.     Appearance: She is obese. She is ill-appearing. She is not toxic-appearing or diaphoretic.   HENT:      Head: Normocephalic and atraumatic.      Mouth/Throat:      Mouth: Mucous membranes are moist.      Pharynx: Oropharynx is clear.   Cardiovascular:      Rate and Rhythm: Regular rhythm.      Heart sounds: Normal heart sounds.   Pulmonary:      Effort: Pulmonary effort is normal. No respiratory distress.      Breath sounds: Normal breath sounds.   Abdominal:      General: Abdomen is flat.      Palpations: Abdomen is soft.      Tenderness: There is abdominal tenderness (Left lower quadrant).   Musculoskeletal:         General: Normal range of motion.      Cervical back: Neck supple.   Skin:     General: Skin is warm and dry.   Neurological:      General: No focal deficit present.      Mental Status: She is alert and oriented to person, place, and time.         ED Medications  Medications   ondansetron (ZOFRAN) injection 4 mg (4 mg Intravenous Given 9/8/24 1137)   sodium chloride 0.9 % bolus 1,000 mL (0 mL Intravenous Stopped 9/8/24 1342)   acetaminophen (TYLENOL) tablet 975 mg (975 mg Oral Given 9/8/24 1209)   iohexol (OMNIPAQUE) 350 MG/ML injection (MULTI-DOSE) 100 mL (100 mL Intravenous Given 9/8/24 1302)   sodium chloride 0.9 % bolus 1,000 mL (1,000 mL Intravenous New Bag 9/8/24 1434)   piperacillin-tazobactam (ZOSYN) 4.5 g in sodium chloride 0.9 % 100 mL IVPB (4.5 g Intravenous New Bag 9/8/24 1434)    multi-electrolyte (ISOLYTE-S PH 7.4) bolus 1,000 mL (0 mL Intravenous Stopped 9/8/24 1730)   potassium chloride (Klor-Con M20) CR tablet 40 mEq (40 mEq Oral Given 9/9/24 1724)       Diagnostic Studies  Results Reviewed       Procedure Component Value Units Date/Time    Blood culture #1 [[id number]] Collected: 09/08/24 1212    Lab Status: Preliminary result Specimen: Blood from Arm, Right Updated: 09/12/24 1601     Blood Culture No Growth After 4 Days.    Blood culture #2 [[id number]]  (Abnormal)  (Susceptibility) Collected: 09/08/24 1212    Lab Status: Final result Specimen: Blood from Arm, Right Updated: 09/11/24 1441     Blood Culture Staphylococcus hominis      Staphylococcus epidermidis     Gram Stain Result Gram positive cocci in clusters    Narrative:      Susceptibility testing will not be performed as these organisms, when isolated from a single set of blood cultures, represents probable skin erika contamination. Please call the Microbiology Laboratory within 5 days at [phone] if further workup is required.      Susceptibility       Staphylococcus hominis (1)       Antibiotic Interpretation Microscan Method Status    ZID Performed  Yes JULIO Final              Staphylococcus epidermidis (2)       Antibiotic Interpretation Microscan Method Status    ZID Performed  Yes JULIO Final                       Blood Culture Identification Panel [[id number]]  (Abnormal) Collected: 09/08/24 1212    Lab Status: Final result Specimen: Blood from Arm, Right Updated: 09/11/24 1441     Staphylococcus Detected    Narrative:      Routine culture and susceptiblity to follow for confirmation.    Film Array panel tests for 11 gram positive organisms, 15 gram negative organisms, 7 yeast species and 10 resistance genes.     Urine culture [[id number]]  (Abnormal)  (Susceptibility) Collected: 09/08/24 1358    Lab Status: Final result Specimen: Urine Updated: 09/10/24 0712     Urine Culture 50,000-59,000 cfu/ml Escherichia coli       20,000-29,000 cfu/ml    Susceptibility       Escherichia coli (1)       Antibiotic Interpretation Microscan   Method Status    ZID Performed  Yes  JULIO Final    Amoxicillin + Clavulanate Susceptible <=8/4 ug/ml JULIO Final    Ampicillin ($$) Resistant >16.00 ug/ml JULIO Final    Ampicillin + Sulbactam ($) Susceptible 8/4 ug/ml JULIO Final    Aztreonam ($$$)  Susceptible <=4 ug/ml JULIO Final    Cefazolin ($) Susceptible 4.00 ug/ml JULIO Final    Ciprofloxacin ($)  Susceptible <=0.25 ug/ml JULIO Final    Gentamicin ($$) Susceptible <=2 ug/ml JULIO Final    Levofloxacin ($) Susceptible <=0.50 ug/ml JULIO Final    Nitrofurantoin Susceptible <=32 ug/ml JULIO Final    Tetracycline Susceptible <=4 ug/ml JULIO Final    Trimethoprim + Sulfamethoxazole ($$$) Susceptible <=0.5/9.5 ug/ml JULIO Final                       Hemoglobin A1c w/EAG Estimation (Prechecked if no A1C within 90 days) [[id number]]  (Abnormal) Collected: 09/08/24 1212    Lab Status: Final result Specimen: Blood from Arm, Right Updated: 09/09/24 0721     Hemoglobin A1C 9.7 %       mg/dl     Basic metabolic panel [[id number]]  (Abnormal) Collected: 09/09/24 0619    Lab Status: Final result Specimen: Blood from Arm, Right Updated: 09/09/24 0715     Sodium 137 mmol/L      Potassium 3.3 mmol/L      Chloride 100 mmol/L      CO2 28 mmol/L      ANION GAP 9 mmol/L      BUN 9 mg/dL      Creatinine 0.68 mg/dL      Glucose 251 mg/dL      Calcium 8.4 mg/dL      eGFR 92 ml/min/1.73sq m     Narrative:      National [employer] guidelines for Chronic Kidney Disease (CKD):     Stage 1 with normal or high GFR (GFR > 90 mL/min/1.73 square meters)    Stage 2 Mild CKD (GFR = 60-89 mL/min/1.73 square meters)    Stage 3A Moderate CKD (GFR = 45-59 mL/min/1.73 square meters)    Stage 3B Moderate CKD (GFR = 30-44 mL/min/1.73 square meters)    Stage 4 Severe CKD (GFR = 15-29 mL/min/1.73 square meters)    Stage 5 End Stage CKD (GFR <15 mL/min/1.73 square meters)  Note: GFR calculation is  accurate only with a steady state creatinine    CBC and differential [[id number]]  (Abnormal) Collected: 09/09/24 0619    Lab Status: Final result Specimen: Blood from Arm, Right Updated: 09/09/24 0708     WBC 9.63 Thousand/uL      RBC 3.89 Million/uL      Hemoglobin 11.7 g/dL      Hematocrit 34.9 %      MCV 90 fL      MCH 30.1 pg      MCHC 33.5 g/dL      RDW 12.4 %      MPV 9.3 fL      Platelets 145 Thousands/uL      nRBC 0 /100 WBCs      Segmented % 77 %      Immature Grans % 1 %      Lymphocytes % 12 %      Monocytes % 10 %      Eosinophils Relative 0 %      Basophils Relative 0 %      Absolute Neutrophils 7.41 Thousands/µL      Absolute Immature Grans 0.05 Thousand/uL      Absolute Lymphocytes 1.16 Thousands/µL      Absolute Monocytes 0.94 Thousand/µL      Eosinophils Absolute 0.04 Thousand/µL      Basophils Absolute 0.03 Thousands/µL     Platelet count [[id number]]  (Normal) Collected: 09/09/24 0621    Lab Status: Final result Specimen: Blood from Arm, Right Updated: 09/09/24 0707     Platelets 150 Thousands/uL      MPV 9.0 fL     Lactic acid 2 Hours [[id number]]  (Normal) Collected: 09/08/24 1548    Lab Status: Final result Specimen: Blood from Arm, Right Updated: 09/08/24 1616     LACTIC ACID 1.0 mmol/L     Narrative:      Result may be elevated if tourniquet was used during collection.    Urine Microscopic [[id number]]  (Abnormal) Resulted: 09/08/24 1358    Lab Status: Final result Specimen: Urine Updated: 09/08/24 1358     RBC, UA 4-10 /hpf      WBC, UA Innumerable /hpf      Epithelial Cells Occasional /hpf      Bacteria, UA Moderate /hpf     UA w Reflex to Microscopic w Reflex to Culture [[id number]]  (Abnormal) Resulted: 09/08/24 1353    Lab Status: Final result Specimen: Urine Updated: 09/08/24 1353     Color, UA Light Yellow     Clarity, UA Turbid     Specific Gravity, UA 1.015     pH, UA 5.5     Leukocytes, UA Large     Nitrite, UA Negative     Protein, UA 30 (1+) mg/dl      Glucose, UA Negative mg/dl       Ketones, UA Negative mg/dl      Urobilinogen, UA <2.0 mg/dl      Bilirubin, UA Negative     Occult Blood, UA Moderate    Procalcitonin [[id number]]  (Abnormal) Collected: 09/08/24 1212    Lab Status: Final result Specimen: Blood from Arm, Right Updated: 09/08/24 1301     Procalcitonin 0.50 ng/ml     Lactic acid [[id number]]  (Abnormal) Collected: 09/08/24 1212    Lab Status: Final result Specimen: Blood from Arm, Right Updated: 09/08/24 1252     LACTIC ACID 2.3 mmol/L     Narrative:      Result may be elevated if tourniquet was used during collection.    Lipase [[id number]]  (Normal) Collected: 09/08/24 1212    Lab Status: Final result Specimen: Blood from Arm, Right Updated: 09/08/24 1252     Lipase 12 u/L     Comprehensive metabolic panel [[id number]]  (Abnormal) Collected: 09/08/24 1212    Lab Status: Final result Specimen: Blood from Arm, Right Updated: 09/08/24 1252     Sodium 137 mmol/L      Potassium 3.4 mmol/L      Chloride 98 mmol/L      CO2 29 mmol/L      ANION GAP 10 mmol/L      BUN 11 mg/dL      Creatinine 0.76 mg/dL      Glucose 275 mg/dL      Calcium 9.7 mg/dL      AST 16 U/L      ALT 20 U/L      Alkaline Phosphatase 79 U/L      Total Protein 7.4 g/dL      Albumin 3.9 g/dL      Total Bilirubin 0.73 mg/dL      eGFR 82 ml/min/1.73sq m     Narrative:      National Kid[employer] Disease Foundation guidelines for Chronic Kidney Disease (CKD):     Stage 1 with normal or high GFR (GFR > 90 mL/min/1.73 square meters)    Stage 2 Mild CKD (GFR = 60-89 mL/min/1.73 square meters)    Stage 3A Moderate CKD (GFR = 45-59 mL/min/1.73 square meters)    Stage 3B Moderate CKD (GFR = 30-44 mL/min/1.73 square meters)    Stage 4 Severe CKD (GFR = 15-29 mL/min/1.73 square meters)    Stage 5 End Stage CKD (GFR <15 mL/min/1.73 square meters)  Note: GFR calculation is accurate only with a steady state creatinine    Protime-INR [[id number]]  (Normal) Collected: 09/08/24 1212    Lab Status: Final result Specimen: Blood from Arm, Right  Updated: 09/08/24 1244     Protime 13.9 seconds      INR 1.04    Narrative:      INR Therapeutic Range    Indication                                             INR Range      Atrial Fibrillation                                               2.0-3.0  Hypercoagulable State                                    2.0.2.3  Left Ventricular Asist Device                            2.0-3.0  Mechanical Heart Valve                                  -    Aortic(with afib, MI, embolism, HF, LA enlargement,    and/or coagulopathy)                                     2.0-3.0 (2.5-3.5)     Mitral                                                             2.5-3.5  Prosthetic/Bioprosthetic Heart Valve               2.0-3.0  Venous thromboembolism (VTE: VT, PE        2.0-3.0    APTT [[id number]]  (Normal) Collected: 09/08/24 1212    Lab Status: Final result Specimen: Blood from Arm, Right Updated: 09/08/24 1244     PTT 25 seconds     CBC and differential [[id number]]  (Abnormal) Collected: 09/08/24 1212    Lab Status: Final result Specimen: Blood from Arm, Right Updated: 09/08/24 1231     WBC 11.29 Thousand/uL      RBC 4.77 Million/uL      Hemoglobin 13.9 g/dL      Hematocrit 41.4 %      MCV 87 fL      MCH 29.1 pg      MCHC 33.6 g/dL      RDW 12.0 %      MPV 9.0 fL      Platelets 182 Thousands/uL      nRBC 0 /100 WBCs      Segmented % 82 %      Immature Grans % 1 %      Lymphocytes % 12 %      Monocytes % 5 %      Eosinophils Relative 0 %      Basophils Relative 0 %      Absolute Neutrophils 9.25 Thousands/µL      Absolute Immature Grans 0.07 Thousand/uL      Absolute Lymphocytes 1.38 Thousands/µL      Absolute Monocytes 0.55 Thousand/µL      Eosinophils Absolute 0.02 Thousand/µL      Basophils Absolute 0.02 Thousands/µL     Fingerstick Glucose (POCT) [[id number]]  (Abnormal) Collected: 09/08/24 1222    Lab Status: Final result Specimen: Blood Updated: 09/08/24 1222     POC Glucose 282 mg/dl                    CT abdomen pelvis with  "contrast   Final Result by Laurent Ga MD (09/08 2395)   1.  Area of hypoattenuation in the midpole of the left kidney may represent pyelonephritis or a lesion. Correlation with urinalysis advised. Follow-up contrast-enhanced CT with renal protocol or MRI with intravenous contrast is advised in 2 months to    assess for a lesion.   2.  Punctate focus of air is considered abnormal in the absence of recent catheterization, and can represent infection. Correlation with urinalysis is advised.   3.  No acute gastroenterologic pathology.      The study was marked in EPIC for immediate notification.         Workstation performed: [id number]         XR chest pa & lateral   Final Result by Destiny Clifton MD (09/09 7074)      No acute cardiopulmonary disease.            Workstation performed: [id number]               Procedures  Procedures      ED Course                          Initial Sepsis Screening       Row Name 09/08/24 1259                Is the patient's history suggestive of a new or worsening infection? Yes (Proceed)  -BM        Suspected source of infection acute abdominal infection  -BM        Indicate SIRS criteria Hyperthemia > 38.3C (100.9F) OR Hypothermia <36C (96.8F);Tachycardia > 90 bpm  -BM        Are two or more of the above signs & symptoms of infection both present and new to the patient? Yes (Proceed)  -BM        Assess for evidence of organ dysfunction: Are any of the below criteria present within 6 hours of suspected infection and SIRS criteria that are NOT considered to be chronic conditions? Lactate > 2.0  -BM        Date of presentation of severe sepsis 09/08/24  -BM        Time of presentation of severe sepsis 1300  -BM        Sepsis Note: Click \"NEXT\" below (NOT \"close\") to generate sepsis note based on above information. YES (proceed by clicking \"NEXT\")  -BM                  User Key  (r) = Recorded By, (t) = Taken By, (c) = Cosigned By      Initials Name Provider Type    NATASHA MITCHELL" "MD Manuel Resident                  Default Flowsheet Data (Last 720 Hours)       Sepsis Reassess       Row Name 09/08/24 1407                   Repeat Volume Status and Tissue Perfusion Assessment Performed    Date of Reassessment: 09/08/24  -        Time of Reassessment: 1407  -BM        Sepsis Reassessment Note: Click \"NEXT\" below (NOT \"close\") to generate sepsis reassessment note. YES (proceed by clicking \"NEXT\")  -        Repeat Volume Status and Tissue Perfusion Assessment Performed --                  User Key  (r) = Recorded By, (t) = Taken By, (c) = Cosigned By      Initials Name Provider Type     Nahun Jackson MD Resident                  SBIRT 22yo+      Flowsheet Row Most Recent Value   Initial Alcohol Screen: US AUDIT-C     1. How often do you have a drink containing alcohol? 0 Filed at: 09/08/2024 1109   Audit-C Score 0 Filed at: 09/08/2024 1109   MIKE: How many times in the past year have you...    Used an illegal drug or used a prescription medication for non-medical reasons? Never Filed at: 09/08/2024 1109                  Medical Decision Making  Patient is a 64-year-old female presenting with vomiting and fever.    Differential includes but not limited to UTI, appendicitis, diverticulitis, SBO, doubt intracranial pathology, doubt viral gastroenteritis.  Infectious workup ordered with concern for UTI with air in the bladder and possible pyelonephritis on CT scan.  Patient treated with fluids and antibiotics and symptomatically.  Monet placed for proper bladder drainage.    Patient admitted for further management workup.    Amount and/or Complexity of Data Reviewed  Labs: ordered.  Radiology: ordered.    Risk  OTC drugs.  Prescription drug management.  Decision regarding hospitalization.          Disposition  Final diagnoses:   UTI (urinary tract infection)   Sepsis (HCC)     Time reflects when diagnosis was documented in both MDM as applicable and the Disposition within this note       " Time User Action Codes Description Comment    9/8/2024  2:09 PM Nahun Jackson Add [N39.0] UTI (urinary tract infection)     9/8/2024  2:09 PM Nahun Jackson Add [A41.9] Sepsis (HCC)     9/11/2024 10:30 AM RagabSuadBeach Add [E11.65] Uncontrolled type 2 diabetes mellitus with hyperglycemia (HCC)     9/11/2024 10:30 AM Ragab, [place] Add [N12] Pyelonephritis           ED Disposition       ED Disposition   Admit    Condition   Stable    Date/Time   Sun Sep 8, 2024 9141    Comment   --             Follow-up Information    None         Discharge Medication List as of 9/11/2024  3:17 PM        START taking these medications    Details   Alcohol Swabs 70 % PADS May substitute brand based on insurance coverage. Check glucose TID., Normal      Blood Glucose Monitoring Suppl (OneTouch Verio Reflect) w/Device KIT May substitute brand based on insurance coverage. Check glucose TID., Normal      cefpodoxime (VANTIN) 200 mg tablet Take 1 tablet (200 mg total) by mouth 2 (two) times a day for 6 days Do not start before September 12, 2024., Starting Thu 9/12/2024, Until Wed 9/18/2024, Normal      glucose blood (OneTouch Verio) test strip May substitute brand based on insurance coverage. Check glucose TID., Normal      Insulin Glargine Solostar (Basaglar KwikPen) 100 UNIT/ML SOPN Inject 0.25 mL (25 Units total) under the skin daily at bedtime, Starting Wed 9/11/2024, Normal      !! Insulin Pen Needle (BD Pen Needle Dayna 2nd Gen) 32G X 4 MM MISC For use with insulin pen. Pharmacy may dispense brand covered by insurance., Normal      OneTouch Delica Lancets 33G MISC May substitute brand based on insurance coverage. Check glucose TID., Normal       !! - Potential duplicate medications found. Please discuss with provider.        CONTINUE these medications which have NOT CHANGED    Details   dulaglutide (Trulicity) 3 MG/0.5ML injection Inject 0.5 mL (3 mg total) under the skin once a week, Starting Mon 7/29/2024, Normal      !!  Insulin Pen Needle 29G X 10MM MISC Use once a week, Starting Wed 3/22/2023, Normal      losartan-hydrochlorothiazide (HYZAAR) 100-25 MG per tablet Take 1 tablet by mouth daily, Starting Mon 7/29/2024, Normal      rosuvastatin (CRESTOR) 10 MG tablet Take 1 tablet (10 mg total) by mouth daily, Starting Mon 7/29/2024, Normal      !! UltiCare Mini Pen Needles 31G X 6 MM MISC Starting Wed 9/29/2021, Historical Med      verapamil (Verelan) 240 MG 24 hr capsule Take 1 capsule (240 mg total) by mouth daily at bedtime, Starting Mon 7/29/2024, Normal      vitamin B-12 (VITAMIN B-12) 1,000 mcg tablet Take 1 tablet (1,000 mcg total) by mouth daily, Starting Mon 4/29/2024, Normal       !! - Potential duplicate medications found. Please discuss with provider.        STOP taking these medications       clotrimazole (LOTRIMIN) 1 % cream Comments:   Reason for Stopping:         glipiZIDE (GLUCOTROL) 5 mg tablet Comments:   Reason for Stopping:         meloxicam (Mobic) 15 mg tablet Comments:   Reason for Stopping:         ondansetron (ZOFRAN) 4 mg tablet Comments:   Reason for Stopping:             Outpatient Discharge Orders   Discharge Diet     Activity as tolerated     Call provider for:  redness, tenderness, or signs of infection (pain, swelling, redness, odor or green/yellow discharge around incision site)     Call provider for:  persistent nausea or vomiting     Call provider for:  severe uncontrolled pain     Call provider for:  persistent dizziness or light-headedness       PDMP Review         Value Time User    PDMP Reviewed  Yes 2/23/2022  5:21 AM Chin Mcdonough MD             ED Provider  Attending physically available and evaluated Michelle Rosa. I managed the patient along with the ED Attending.    Electronically Signed by           Nahun Jackson MD  09/13/24 0080

## 2024-09-08 NOTE — SEPSIS NOTE
"  Sepsis Note   Michelle Rosa 64 y.o. female MRN: [id number]  Unit/Bed#: ED 27 Encounter: [id number]       Initial Sepsis Screening       Row Name 09/08/24 1259                Is the patient's history suggestive of a new or worsening infection? Yes (Proceed)  -BM        Suspected source of infection acute abdominal infection  -BM        Indicate SIRS criteria Hyperthemia > 38.3C (100.9F) OR Hypothermia <36C (96.8F);Tachycardia > 90 bpm  -BM        Are two or more of the above signs & symptoms of infection both present and new to the patient? Yes (Proceed)  -BM        Assess for evidence of organ dysfunction: Are any of the below criteria present within 6 hours of suspected infection and SIRS criteria that are NOT considered to be chronic conditions? Lactate > 2.0  -BM        Date of presentation of severe sepsis 09/08/24  -BM        Time of presentation of severe sepsis 1300  -BM        Sepsis Note: Click \"NEXT\" below (NOT \"close\") to generate sepsis note based on above information. YES (proceed by clicking \"NEXT\")  -BM                  User Key  (r) = Recorded By, (t) = Taken By, (c) = Cosigned By      Initials Name Provider Type     Nahun Jackson MD Resident                    Default Flowsheet Data (Last 720 Hours)       Sepsis Reassess       Row Name 09/08/24 1407                   Repeat Volume Status and Tissue Perfusion Assessment Performed    Date of Reassessment: 09/08/24  -        Time of Reassessment: 1407  -BM        Sepsis Reassessment Note: Click \"NEXT\" below (NOT \"close\") to generate sepsis reassessment note. YES (proceed by clicking \"NEXT\")  -        Repeat Volume Status and Tissue Perfusion Assessment Performed --                  User Key  (r) = Recorded By, (t) = Taken By, (c) = Cosigned By      Initials Name Provider Type     Nahun Jackson MD Resident                    Body mass index is 37.2 kg/m².  Wt Readings from Last 1 Encounters:   09/08/24 80.7 kg (178 lb)     IBW (Ideal " Body Weight): 40.9 kg    Ideal body weight: 47.1 kg (103 lb 14.5 oz)  Adjusted ideal body weight: 60.6 kg (133 lb 8.7 oz)

## 2024-09-08 NOTE — PROGRESS NOTES
INTERNAL MEDICINE RESIDENCY SENIOR ADMISSION NOTE     Name: Michelle Rosa   Age & Sex: 64 y.o. female   MRN: [id number]  Unit/Bed#: [facility] 828-01   Encounter: [id number]  Primary Care Provider: Fausto Jimenez MD    Admit to team: SOD Team B     Patient seen and examined. Reviewed H&P per Dr. Guidry . Agree with the assessment and plan with any exception/addition as noted below:    64-year-old female with past medical history of controlled type 2 diabetes, hypertension, B12 deficiency, hyperlipidemia, osteoarthritis presents to the ED due to fevers chills nausea and vomiting that been on and off since Monday.  She states Monday she developed a temp of 99 and had malaise.  She states Wednesday started feeling worse with fever, rigors as well as nausea and bilious emesis.  She states that yesterday night she was having rigors and fevers and was urged by her granddaughters to come in.  She denied any vomiting dysuria/CVA tenderness/urinary frequency/urinary urgency.  Reports having 2 UTIs in her life that was treated by her PCP outpatient.  Upon chart review there is questionable sarcoidosis as well as rheumatoid arthritis and patient states she has never heard of her having sarcoidosis and states she does have arthritis but is unsure whether rheumatoid or osteoarthritis and denies ever being on any immunosuppressive agents.     On initial presentation she was found to be febrile to 103 tachycardia to 133 blood pressure 120/85 is satting well on room air. She was found to have UTI and CT revealed pyelonephritis and a punctate focus of air seen in the bladder.  She received 2 L of normal saline, administer Zosyn as well as placed a Monet due to the punctate focus of air in the bladder and no history of previous catheterization.    Principal Problem:    Sepsis (HCC)  Active Problems:    Diabetes mellitus (HCC)    Essential hypertension    Mixed hyperlipidemia    B12 deficiency    Pyelonephritis      Code Status: Level 1 -  Full Code  Admission Status: INPATIENT   Disposition: Patient requires Med/Surg  Expected Length of Stay: Greater than 2 midnights     Plan:    Follow up blood cultures  Follow up urine culture  Will start patient on ceftriaxone 1 g daily  PRN tylenol 650 mg for fever  PRN zofran 4 mg Q6h for nausea  CXR final read pending  S/p 2 L of NS bolus, start 1 L isolyte bolus d/t hypotension  Hold home antihypertensives  Monet placed in the ED, will do voiding trial  Insulin SSI

## 2024-09-08 NOTE — H&P
INTERNAL MEDICINE RESIDENCY ADMISSION H&P     Name: Michelle Rosa   Age & Sex: 64 y.o. female   MRN: [id number]  Unit/Bed#: [facility] 828-01   Encounter: [id number]  Primary Care Provider: Fausto Jimenez MD    Code Status: Level 1 - Full Code  Admission Status: INPATIENT   Disposition: Patient requires Med/Surg    Admit to team: SOD Team B     ASSESSMENT/PLAN     Principal Problem:    Sepsis (HCC)  Active Problems:    Pyelonephritis    Essential hypertension    Diabetes mellitus (HCC)    Mixed hyperlipidemia    B12 deficiency      * Sepsis (HCC)  Assessment & Plan  64 y.o. female with a PMHx of T2DM with most recent A1C 6.8, essential HTN, B12 deficiency, OA of the knees, mixed HLD presented to the ED with history of nausea and vomiting since Monday. Patient's granddaughters at bedside helping provide history, they reported she had a Tmax of 103. On presentation to the ED Temp 103, tachycardia to 130s, WBC elevated to 11.29, Lactic 2.3. Pt had septic workup in the ED, UA was positive for bacteria, WBC, and leukocytes.   CT A/P with contrast: concern for pyelonephritis as well as punctate focus of air in the bladder.  Pt is s/p one dose of zosyn in the ED, 2L of NS, catheter placed in the ED.  Lactic acid 2.3 -> 1.0     Plan:   Follow up blood cultures  Follow up urine culture  Will start patient on ceftriaxone 1 g daily  PRN tylenol 650 mg for fever  PRN zofran 4 mg Q6h for nausea  CXR final read pending  S/p 2 L of NS bolus, start 1 L isolyte bolus d/t hypotension  Monet placed in the ED, will do voiding trial    Pyelonephritis  Assessment & Plan  CT A/P with contrast: Area of hypoattenuation in the midpole of the left kidney may represent pyelonephritis or a lesion. Correlation with urinalysis advised. Follow-up contrast-enhanced CT with renal protocol or MRI with intravenous contrast is advised in 2 months to assess for a lesion.  Patient has a one week history of fever and nausea/vomiting, denies urinary symptoms.  Denies dysuria, increased urinary frequency, hematuria. UA positive for bacteria, WBC, and leukocytes. Pt has no recorded history of prior UTI or urine cultures.    Plan:   Follow urine culture   Monet catheter in place that was placed in ED, monitor output, pending voiding trial  Start Ceftriaxone    Essential hypertension  Assessment & Plan  Home regimen losartan-hydrochlorothiazide 100-25 mg daily   Verapamil 240 mg daily    Plan:   - Holding all hypertensive medications while patient is septic  - continue to monitor BP as patient was hypotensive 104/45  - s/p 2 L of NS in ED, patient has become hypotensive started on 1 L bolus of isolyte    B12 deficiency  Assessment & Plan  Patient has known history of B12 deficiency, takes 1,000 mcg daily. Continue home medication    Mixed hyperlipidemia  Assessment & Plan  Patient taking rosuvastatin 10 mg daily.     Plan:   Pravastatin 80 mg daily    Diabetes mellitus (HCC)  Assessment & Plan  Lab Results   Component Value Date    HGBA1C 6.8 (H) 04/26/2024       Recent Labs     09/08/24  1222   POCGLU 282*       Blood Sugar Average: Last 72 hrs:  (P) 282  Trulicity 3 mg weekly  Glipizide 5 mg daily     Plan:   - Follow up HbA1c  - SSI, monitor glucose  - Hold home trulicity/glipizide        VTE Pharmacologic Prophylaxis: Enoxaparin (Lovenox)  VTE Mechanical Prophylaxis: sequential compression device    CHIEF COMPLAINT     Chief Complaint   Patient presents with    Vomiting     Per pt she has been vomiting, diarrhea and having fevers since this past Monday       HISTORY OF PRESENT ILLNESS     Patient is a 64-year-old female with a past mental history of type 2 diabetes with most recent A1C of 6.8, essential hypertension, B12 deficiency, mixed hyperlipidemia, osteoarthritis of bilateral knees.  Patient has a 1 week history of vomiting and a fever.  Her temperature was Tmax of 103.  Presented to the ED due to poor p.o. intake as well as the fever and continued vomiting.   Patient was accompanied by her 2 granddaughters who helped provide some of her medical history as patient was feeling poorly.  In the ED patient's temp was 103, tachycardia in the 130s, patient had a mildly elevated white blood cell count to 11.29.  Patient had a septic workup and was found to have a UTI, blood culture and urine culture pending.  Chest x-ray pending final read.  Urinalysis was positive for white blood cells, leukocytes, bacteria.  Patient had an elevated lactic acid at 2.3.  Patient had a CT scan that showed concern for pyelonephritis and punctate focus of air seen in the bladder.  Status post 2 L of normal saline bolus in the ED, catheter was placed, 1 dose of Zosyn.  Patient is alert and oriented x 3.     Patient was seen and examined on date of admission.  Patient was ill-appearing and laying in bed with granddaughters at bedside.  Patient confirmed that she had not been feeling well for the past week.  Patient states she has no history of UTIs in the past.  Patient states she follows regularly with her outpatient PCP Dr. Jimenez.  Patient states she takes her medications as directed.  Patient was hypotensive when brought to the floor, and was started on 1 L bolus of isolyte.  Patient was transitioned from Zosyn to ceftriaxone.    Patient reports fever, chills, nausea, vomiting, flank pain, suprapubic pain.  Patient denies headache, chest pain, palpitations, abdominal pain, constipation, diarrhea. Patient is being admitted for urosepsis to [facility] on SOD B.    REVIEW OF SYSTEMS     Review of Systems   Constitutional:  Positive for appetite change, chills and fever. Negative for fatigue.   HENT:  Negative for sore throat.    Respiratory:  Negative for cough, chest tightness, shortness of breath and wheezing.    Cardiovascular:  Negative for chest pain and palpitations.   Gastrointestinal:  Positive for nausea and vomiting. Negative for abdominal pain, constipation and diarrhea.   Genitourinary:   Positive for flank pain. Negative for decreased urine volume, difficulty urinating, dysuria, hematuria and urgency.   Musculoskeletal:  Positive for back pain.   Neurological:  Negative for dizziness and light-headedness.     OBJECTIVE     Vitals:    24 1230 24 1300 24 1529 24 1803   BP: 137/62 137/62 (!) 104/45 (!) 110/47   BP Location:       Pulse: (!) 126 (!) 124 98 101   Resp:    18   Temp:   (!) 100.7 °F (38.2 °C) 100 °F (37.8 °C)   TempSrc:       SpO2: 90% 90% 91% 97%   Weight:       Height:          Temperature:   Temp (24hrs), Av.2 °F (38.4 °C), Min:100 °F (37.8 °C), Max:103 °F (39.4 °C)    Temperature: 100 °F (37.8 °C)  Intake & Output:  I/O          0701   0700  0701   0700  0701   0700    IV Piggyback   1000    Total Intake(mL/kg)   1000 (12.4)    Net   +1000                 Weights:   IBW (Ideal Body Weight): 40.9 kg    Body mass index is 37.2 kg/m².  Weight (last 2 days)       Date/Time Weight    24 1107 80.7 (178)          Physical Exam  Constitutional:       General: She is not in acute distress.     Appearance: Normal appearance. She is obese. She is ill-appearing. She is not diaphoretic.   HENT:      Head: Normocephalic and atraumatic.      Mouth/Throat:      Mouth: Mucous membranes are dry.   Cardiovascular:      Rate and Rhythm: Regular rhythm. Tachycardia present.      Pulses: Normal pulses.      Heart sounds: Normal heart sounds.   Pulmonary:      Effort: Pulmonary effort is normal.      Breath sounds: Normal breath sounds. No wheezing or rales.   Abdominal:      General: Abdomen is flat. Bowel sounds are normal.      Palpations: Abdomen is soft.      Tenderness: There is no abdominal tenderness. There is right CVA tenderness and left CVA tenderness.      Comments: Nontender suprapubic, tenderness to B/L flank   Musculoskeletal:      Right lower leg: No edema.      Left lower leg: No edema.   Skin:     General: Skin is warm.    Neurological:      Mental Status: She is alert and oriented to person, place, and time. Mental status is at baseline.   Psychiatric:         Behavior: Behavior normal.       PAST MEDICAL HISTORY     Past Medical History:   Diagnosis Date    Diabetes mellitus (HCC)     Hypercholesterolemia     Hypertension     Rheumatoid arthritis (HCC)     Sarcoidosis      PAST SURGICAL HISTORY     Past Surgical History:   Procedure Laterality Date    CHOLECYSTECTOMY      COLONOSCOPY  05/15/2009    Abnormal     KNEE SURGERY Left 10/2022    MAMMO (HISTORICAL)  09/30/2016    Negative      SOCIAL & FAMILY HISTORY     Social History     Substance and Sexual Activity   Alcohol Use Not Currently       Social History     Substance and Sexual Activity   Drug Use Never     Social History     Tobacco Use   Smoking Status Never   Smokeless Tobacco Never     Family History   Problem Relation Age of Onset    Diabetes Mother     Coronary artery disease Father     No Known Problems Sister     No Known Problems Sister     No Known Problems Sister     No Known Problems Daughter     No Known Problems Daughter     No Known Problems Daughter     Diabetes Maternal Grandmother     Stomach cancer Maternal Grandfather     No Known Problems Paternal Grandmother     No Known Problems Son     No Known Problems Son     No Known Problems Son     No Known Problems Maternal Aunt     No Known Problems Maternal Aunt     No Known Problems Maternal Aunt     No Known Problems Paternal Aunt     No Known Problems Paternal Aunt      LABORATORY DATA     Labs: I have personally reviewed pertinent reports.    Results from last 7 days   Lab Units 09/08/24  1212   WBC Thousand/uL 11.29*   HEMOGLOBIN g/dL 13.9   HEMATOCRIT % 41.4   PLATELETS Thousands/uL 182   SEGS PCT % 82*   MONO PCT % 5   EOS PCT % 0      Results from last 7 days   Lab Units 09/08/24  1212   POTASSIUM mmol/L 3.4*   CHLORIDE mmol/L 98   CO2 mmol/L 29   BUN mg/dL 11   CREATININE mg/dL 0.76   CALCIUM mg/dL  9.7   ALK PHOS U/L 79   ALT U/L 20   AST U/L 16              Results from last 7 days   Lab Units 09/08/24  1212   INR  1.04   PTT seconds 25     Results from last 7 days   Lab Units 09/08/24  1548   LACTIC ACID mmol/L 1.0         Micro:  Lab Results   Component Value Date    BLOODCX Received in Microbiology Lab. Culture in Progress. 09/08/2024    BLOODCX Received in Microbiology Lab. Culture in Progress. 09/08/2024     IMAGING & DIAGNOSTIC TESTS     Imaging: I have personally reviewed pertinent reports.    CT abdomen pelvis with contrast    Result Date: 9/8/2024  Impression: 1.  Area of hypoattenuation in the midpole of the left kidney may represent pyelonephritis or a lesion. Correlation with urinalysis advised. Follow-up contrast-enhanced CT with renal protocol or MRI with intravenous contrast is advised in 2 months to assess for a lesion. 2.  Punctate focus of air is considered abnormal in the absence of recent catheterization, and can represent infection. Correlation with urinalysis is advised. 3.  No acute gastroenterologic pathology. The study was marked in EPIC for immediate notification. Workstation performed: [id number]     EKG, Pathology, and Other Studies: I have personally reviewed pertinent reports.     ALLERGIES     Allergies   Allergen Reactions    Metformin Abdominal Pain    Sulfa Antibiotics      MEDICATIONS PRIOR TO ARRIVAL     Prior to Admission medications    Medication Sig Start Date End Date Taking? Authorizing Provider   clotrimazole (LOTRIMIN) 1 % cream Apply topically 2 (two) times a day  Patient not taking: Reported on 7/29/2024 12/11/23   Fausto Jimenez MD   dulaglutide (Trulicity) 3 MG/0.5ML injection Inject 0.5 mL (3 mg total) under the skin once a week 7/29/24   Fausto Jimenez MD   glipiZIDE (GLUCOTROL) 5 mg tablet Take 1 tablet (5 mg total) by mouth daily with breakfast 7/29/24   Fausto Jimenez MD   Insulin Pen Needle 29G X 10MM MISC Use once a week 3/22/23   Fausto Jimenez MD    losartan-hydrochlorothiazide (HYZAAR) 100-25 MG per tablet Take 1 tablet by mouth daily 7/29/24   Fausto Jimenez MD   meloxicam (Mobic) 15 mg tablet Take 1 tablet (15 mg total) by mouth daily  Patient not taking: Reported on 10/21/2022 8/30/22   Fausto Jimenez MD   ondansetron (ZOFRAN) 4 mg tablet take 1 tablet by mouth once daily if needed for nausea and vomiting  Patient not taking: Reported on 3/22/2023 10/28/22   Historical Provider, MD   rosuvastatin (CRESTOR) 10 MG tablet Take 1 tablet (10 mg total) by mouth daily 7/29/24   Fausto Jimenez MD   UltiCare Mini Pen Needles 31G X 6 MM MISC  9/29/21   Historical Provider, MD   verapamil (Verelan) 240 MG 24 hr capsule Take 1 capsule (240 mg total) by mouth daily at bedtime 7/29/24   Fausto Jimenez MD   vitamin B-12 (VITAMIN B-12) 1,000 mcg tablet Take 1 tablet (1,000 mcg total) by mouth daily 4/29/24   Fausto Jimenez MD     MEDICATIONS ADMINISTERED IN LAST 24 HOURS     Medication Administration - last 24 hours from 09/07/2024 1832 to 09/08/2024 1832         Date/Time Order Dose Route Action Action by     09/08/2024 1137 EDT ondansetron (ZOFRAN) injection 4 mg 4 mg Intravenous Given Mamie Da Silva RN     09/08/2024 1342 EDT sodium chloride 0.9 % bolus 1,000 mL 0 mL Intravenous Stopped Von Abreu RN     09/08/2024 1209 EDT sodium chloride 0.9 % bolus 1,000 mL 1,000 mL Intravenous New Bag Mamie Da Silva RN     09/08/2024 1209 EDT acetaminophen (TYLENOL) tablet 975 mg 975 mg Oral Given Mamie Da Silva RN     09/08/2024 1302 EDT iohexol (OMNIPAQUE) 350 MG/ML injection (MULTI-DOSE) 100 mL 100 mL Intravenous Given Genevieve Aggarwal     09/08/2024 1434 EDT sodium chloride 0.9 % bolus 1,000 mL 1,000 mL Intravenous New Bag Mamie Da Silva RN     09/08/2024 1434 EDT piperacillin-tazobactam (ZOSYN) 4.5 g in sodium chloride 0.9 % 100 mL IVPB 4.5 g Intravenous New Marlene Da Silva RN     09/08/2024 1631 EDT enoxaparin (LOVENOX) subcutaneous injection 40 mg  "40 mg Subcutaneous Given Stacey Lemons RN     09/08/2024 1800 EDT insulin lispro (HumALOG/ADMELOG) 100 units/mL subcutaneous injection 1-6 Units 3 Units Subcutaneous Given Stacey Lemons RN     09/08/2024 1633 EDT multi-electrolyte (ISOLYTE-S PH 7.4) bolus 1,000 mL 1,000 mL Intravenous New Bag Stacey Lemons RN     09/08/2024 1828 EDT cefTRIAXone (ROCEPHIN) 1,000 mg in dextrose 5 % 50 mL IVPB 1,000 mg Intravenous New Bag Stacey Lemons RN          CURRENT MEDICATIONS     Current Facility-Administered Medications   Medication Dose Route Frequency Provider Last Rate    acetaminophen  650 mg Oral Q6H PRN Loretta Guidry DO      cefTRIAXone  1,000 mg Intravenous Q24H [place] Ragab, DO 1,000 mg (09/08/24 1828)    [START ON 9/9/2024] vitamin B-12  1,000 mcg Oral Daily Loretta Guidry DO      enoxaparin  40 mg Subcutaneous Daily Loretta Guidry DO      insulin lispro  1-6 Units Subcutaneous 4x Daily (AC & HS) Loretta Guidry, DO      ondansetron  4 mg Intravenous Q6H PRN Loretta Guidry, DO      [START ON 9/9/2024] pravastatin  80 mg Oral Daily With Dinner Loretta Guidry DO          acetaminophen, 650 mg, Q6H PRN  ondansetron, 4 mg, Q6H PRN        Admission Time  I spent 1 hour admitting the patient.  This involved direct patient contact where I performed a full history and physical, reviewing previous records, and reviewing laboratory and other diagnostic studies.    Portions of the record may have been created with voice recognition software.  Occasional wrong word or \"sound a like\" substitutions may have occurred due to the inherent limitations of voice recognition software.  Read the chart carefully and recognize, using context, where substitutions have occurred.    ==  Loretta Guidry DO  [facility]  Internal Medicine Residency PGY-1   "

## 2024-09-08 NOTE — ASSESSMENT & PLAN NOTE
Lab Results   Component Value Date    HGBA1C 9.7 (H) 09/08/2024       Recent Labs     09/09/24  1723 09/09/24  2121 09/10/24  0728 09/10/24  1050   POCGLU 333* 307* 250* 306*       Blood Sugar Average: Last 72 hrs:  (P) [id number]  Trulicity 3 mg weekly  Glipizide 5 mg daily     Diabetes now poorly controlled with A1c 9.7 on admission and 4 months ago being 6.8.  This could be a contributor to her pyelonephritis.  She will need to be discharged on insulin as well as glipizide being discontinued    Plan:   - Increase to 20 units of Lantus today  -Diabetes education  - SSI, monitor glucose

## 2024-09-08 NOTE — ASSESSMENT & PLAN NOTE
64 y.o. female with a PMHx of T2DM with most recent A1C 6.8, essential HTN, B12 deficiency, OA of the knees, mixed HLD presented to the ED with history of nausea and vomiting since Monday. Patient's granddaughters at bedside helping provide history, they reported she had a Tmax of 103. On presentation to the ED Temp 103, tachycardia to 130s, WBC elevated to 11.29, Lactic 2.3. Pt had septic workup in the ED, UA was positive for bacteria, WBC, and leukocytes. Urine culture produced 50,000 CFU of E. Coli.   CT A/P with contrast: concern for pyelonephritis as well as punctate focus of air in the bladder.  Blood culture positive for gram + cocci in clusters. Most likely contaminant as 1/2  Pt is s/p one dose of zosyn in the ED, 2L of NS, catheter placed in the ED. Catheter removed on inpatient unit, currently s/p 2 dose of ceftriaxone.   Lactic acid 2.3 -> 1.0   CXR clear.     Plan:   Follow up blood cultures  Cont. ceftriaxone 1 g daily  Transition to cefpodoxime outpatient  PRN tylenol 650 mg for fever  PRN zofran 4 mg Q6h for nausea  S/p 3L IVF resuscitation   Hold losartan-HCTZ in the setting of sepsis

## 2024-09-09 LAB
ANION GAP SERPL CALCULATED.3IONS-SCNC: 9 MMOL/L (ref 4–13)
ATRIAL RATE: 123 BPM
BASOPHILS # BLD AUTO: 0.03 THOUSANDS/ÂΜL (ref 0–0.1)
BASOPHILS NFR BLD AUTO: 0 % (ref 0–1)
BUN SERPL-MCNC: 9 MG/DL (ref 5–25)
CALCIUM SERPL-MCNC: 8.4 MG/DL (ref 8.4–10.2)
CHLORIDE SERPL-SCNC: 100 MMOL/L (ref 96–108)
CO2 SERPL-SCNC: 28 MMOL/L (ref 21–32)
CREAT SERPL-MCNC: 0.68 MG/DL (ref 0.6–1.3)
EOSINOPHIL # BLD AUTO: 0.04 THOUSAND/ÂΜL (ref 0–0.61)
EOSINOPHIL NFR BLD AUTO: 0 % (ref 0–6)
ERYTHROCYTE [DISTWIDTH] IN BLOOD BY AUTOMATED COUNT: 12.4 % (ref 11.6–15.1)
EST. AVERAGE GLUCOSE BLD GHB EST-MCNC: 232 MG/DL
GFR SERPL CREATININE-BSD FRML MDRD: 92 ML/MIN/1.73SQ M
GLUCOSE SERPL-MCNC: 251 MG/DL (ref 65–140)
GLUCOSE SERPL-MCNC: 290 MG/DL (ref 65–140)
GLUCOSE SERPL-MCNC: 299 MG/DL (ref 65–140)
GLUCOSE SERPL-MCNC: 307 MG/DL (ref 65–140)
GLUCOSE SERPL-MCNC: 333 MG/DL (ref 65–140)
HBA1C MFR BLD: 9.7 %
HCT VFR BLD AUTO: 34.9 % (ref 34.8–46.1)
HGB BLD-MCNC: 11.7 G/DL (ref 11.5–15.4)
IMM GRANULOCYTES # BLD AUTO: 0.05 THOUSAND/UL (ref 0–0.2)
IMM GRANULOCYTES NFR BLD AUTO: 1 % (ref 0–2)
LYMPHOCYTES # BLD AUTO: 1.16 THOUSANDS/ÂΜL (ref 0.6–4.47)
LYMPHOCYTES NFR BLD AUTO: 12 % (ref 14–44)
MCH RBC QN AUTO: 30.1 PG (ref 26.8–34.3)
MCHC RBC AUTO-ENTMCNC: 33.5 G/DL (ref 31.4–37.4)
MCV RBC AUTO: 90 FL (ref 82–98)
MONOCYTES # BLD AUTO: 0.94 THOUSAND/ÂΜL (ref 0.17–1.22)
MONOCYTES NFR BLD AUTO: 10 % (ref 4–12)
NEUTROPHILS # BLD AUTO: 7.41 THOUSANDS/ÂΜL (ref 1.85–7.62)
NEUTS SEG NFR BLD AUTO: 77 % (ref 43–75)
NRBC BLD AUTO-RTO: 0 /100 WBCS
P AXIS: 59 DEGREES
PLATELET # BLD AUTO: 145 THOUSANDS/UL (ref 149–390)
PLATELET # BLD AUTO: 150 THOUSANDS/UL (ref 149–390)
PMV BLD AUTO: 9 FL (ref 8.9–12.7)
PMV BLD AUTO: 9.3 FL (ref 8.9–12.7)
POTASSIUM SERPL-SCNC: 3.3 MMOL/L (ref 3.5–5.3)
PR INTERVAL: 112 MS
QRS AXIS: 59 DEGREES
QRSD INTERVAL: 84 MS
QT INTERVAL: 298 MS
QTC INTERVAL: 426 MS
RBC # BLD AUTO: 3.89 MILLION/UL (ref 3.81–5.12)
SODIUM SERPL-SCNC: 137 MMOL/L (ref 135–147)
T WAVE AXIS: 64 DEGREES
VENTRICULAR RATE: 123 BPM
WBC # BLD AUTO: 9.63 THOUSAND/UL (ref 4.31–10.16)

## 2024-09-09 PROCEDURE — 97166 OT EVAL MOD COMPLEX 45 MIN: CPT

## 2024-09-09 PROCEDURE — 93010 ELECTROCARDIOGRAM REPORT: CPT | Performed by: INTERNAL MEDICINE

## 2024-09-09 PROCEDURE — 82948 REAGENT STRIP/BLOOD GLUCOSE: CPT

## 2024-09-09 PROCEDURE — 85049 AUTOMATED PLATELET COUNT: CPT

## 2024-09-09 PROCEDURE — 80048 BASIC METABOLIC PNL TOTAL CA: CPT

## 2024-09-09 PROCEDURE — 85025 COMPLETE CBC W/AUTO DIFF WBC: CPT

## 2024-09-09 PROCEDURE — 97162 PT EVAL MOD COMPLEX 30 MIN: CPT

## 2024-09-09 RX ORDER — INSULIN GLARGINE 100 [IU]/ML
10 INJECTION, SOLUTION SUBCUTANEOUS
Status: DISCONTINUED | OUTPATIENT
Start: 2024-09-09 | End: 2024-09-10

## 2024-09-09 RX ORDER — POTASSIUM CHLORIDE 1500 MG/1
40 TABLET, EXTENDED RELEASE ORAL 2 TIMES DAILY
Status: COMPLETED | OUTPATIENT
Start: 2024-09-09 | End: 2024-09-09

## 2024-09-09 RX ADMIN — PRAVASTATIN SODIUM 80 MG: 80 TABLET ORAL at 16:32

## 2024-09-09 RX ADMIN — ACETAMINOPHEN 650 MG: 325 TABLET ORAL at 09:23

## 2024-09-09 RX ADMIN — POTASSIUM CHLORIDE 40 MEQ: 1500 TABLET, EXTENDED RELEASE ORAL at 09:14

## 2024-09-09 RX ADMIN — ENOXAPARIN SODIUM 40 MG: 40 INJECTION SUBCUTANEOUS at 09:14

## 2024-09-09 RX ADMIN — INSULIN LISPRO 4 UNITS: 100 INJECTION, SOLUTION INTRAVENOUS; SUBCUTANEOUS at 21:27

## 2024-09-09 RX ADMIN — INSULIN LISPRO 4 UNITS: 100 INJECTION, SOLUTION INTRAVENOUS; SUBCUTANEOUS at 09:14

## 2024-09-09 RX ADMIN — INSULIN GLARGINE 10 UNITS: 100 INJECTION, SOLUTION SUBCUTANEOUS at 21:27

## 2024-09-09 RX ADMIN — INSULIN LISPRO 5 UNITS: 100 INJECTION, SOLUTION INTRAVENOUS; SUBCUTANEOUS at 17:24

## 2024-09-09 RX ADMIN — INSULIN LISPRO 4 UNITS: 100 INJECTION, SOLUTION INTRAVENOUS; SUBCUTANEOUS at 11:32

## 2024-09-09 RX ADMIN — CEFTRIAXONE SODIUM 1000 MG: 10 INJECTION, POWDER, FOR SOLUTION INTRAVENOUS at 16:32

## 2024-09-09 RX ADMIN — POTASSIUM CHLORIDE 40 MEQ: 1500 TABLET, EXTENDED RELEASE ORAL at 17:24

## 2024-09-09 RX ADMIN — CYANOCOBALAMIN TAB 500 MCG 1000 MCG: 500 TAB at 09:14

## 2024-09-09 NOTE — PHYSICAL THERAPY NOTE
Physical Therapy Evaluation    Patient's Name: Michelle Rosa    Admitting Diagnosis  UTI (urinary tract infection) [N39.0]  Sepsis (HCC) [A41.9]    Problem List  Patient Active Problem List   Diagnosis    Diabetes mellitus (HCC)    Essential hypertension    Allergic rhinitis    Asthma    Sarcoidosis    Thalassemia    Vitamin D deficiency    Moderate persistent asthma without complication    Varicose veins of both lower extremities with pain    Uncontrolled type 2 diabetes mellitus with hyperglycemia (HCC)    Essential hypertension, benign    Primary osteoarthritis of both knees    Nausea    Mixed hyperlipidemia    Rotator cuff tendonitis, left    Primary osteoarthritis of left knee    Status post left knee replacement    Supraclavicular mass    Neck swelling    History of COVID-19    B12 deficiency    Rotator cuff arthropathy of left shoulder    Nontraumatic tear of left supraspinatus tendon    Sepsis (HCC)    Pyelonephritis       Past Medical History  Past Medical History:   Diagnosis Date    Diabetes mellitus (HCC)     Hypercholesterolemia     Hypertension     Rheumatoid arthritis (HCC)     Sarcoidosis        Past Surgical History  Past Surgical History:   Procedure Laterality Date    CHOLECYSTECTOMY      COLONOSCOPY  05/15/2009    Abnormal     KNEE SURGERY Left 10/2022    MAMMO (HISTORICAL)  09/30/2016    Negative         09/09/24 1211   PT Last Visit   PT Visit Date 09/09/24   Note Type   Note type Evaluation   Pain Assessment   Pain Assessment Tool 0-10   Pain Score 4   Pain Location/Orientation Orientation: Bilateral;Location: Knee   Restrictions/Precautions   Weight Bearing Precautions Per Order No   Home Living   Type of Home House   Home Layout Stairs to enter with rails;One level  (10 JUANA)   Home Equipment Walker   Prior Function   Level of [place] Independent with ADLs;Independent with functional mobility;Independent with IADLS   Lives With Spouse;Son  (+ grandchild)   Receives Help From Family    IADLs Independent with driving;Independent with meal prep;Independent with medication management   Vocational 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   RLE Assessment   RLE Assessment WFL   LLE Assessment   LLE Assessment WFL   Coordination   Movements are Fluid and Coordinated 1   Bed Mobility   Additional Comments Pt greeted walking to the bathroom.   Transfers   Sit to Stand 5  Supervision   Stand to Sit 5  Supervision   Additional Comments no AD   Ambulation/Elevation   Gait pattern Short stride   Gait Assistance 5  Supervision   Additional items Verbal cues   Assistive Device None   Distance 10' + 70'x2   Stair Management Assistance 5  Supervision   Additional items Verbal cues   Stair Management Technique One rail R;Nonreciprocal   Number of Stairs 7   Balance   Static Sitting Good   Dynamic Sitting Fair +   Static Standing Fair   Dynamic Standing Fair -   Ambulatory Fair -   Endurance Deficit   Endurance Deficit Yes   Endurance Deficit Description reduced functional capacity compared to baseline   Activity Tolerance   Activity Tolerance Patient limited by fatigue   Medical Staff Made Aware OT Goldie, restorative Eunice   Nurse Made Aware yes - cleared for therapy   Assessment   Assessment Pt is 64 y.o. female seen for a PT evaluation s/p admit to [facility] on 9/8/2024. Pt presenting w/ sepsis. Please see above for other active problem list / PMH.     PT now consulted to assess functional mobility and needs for safe d/c planning. Prior to admission, pt was I w/ ambulation w/o AD, lives w/ family in a 1SH w/ 10 JUANA.     Currently pt is S for functional transfers; S for ambulation w/o AD; S for stair negotiation. Pt presents near functional baseline.     Encouraged pt to mobilize  w/ restorative + nsg while in-house to prevent functional decline. No further skilled acute PT needs. Will d/c from caseload.   Goals   Patient Goals go to the bathroom   Plan   PT Frequency   (d/c PT)   Discharge Recommendation   Rehab Resource Intensity Level, PT No post-acute rehabilitation needs   AM-PAC Basic Mobility Inpatient   Turning in Flat Bed Without Bedrails 4   Lying on Back to Sitting on Edge of Flat Bed Without Bedrails 4   Moving Bed to Chair 3   Standing Up From Chair Using Arms 3   Walk in Room 3   Climb 3-5 Stairs With Railing 3   Basic Mobility Inpatient Raw Score 20   Basic Mobility Standardized Score 43.99   [facility] Highest Level Of Mobility   -HLM Goal 6: Walk 10 steps or more   -HLM Achieved 7: Walk 25 feet or more   End of Consult   Patient Position at End of Consult Bedside chair;All needs within reach  (on waffle cushion)       Soledad Alexander, PT, DPT

## 2024-09-09 NOTE — UTILIZATION REVIEW
Initial Clinical Review    Admission: Date/Time/Statement:   Admission Orders (From admission, onward)       Ordered        09/08/24 1427  INPATIENT ADMISSION  Once                          Orders Placed This Encounter   Procedures    INPATIENT ADMISSION     Standing Status:   Standing     Number of Occurrences:   1     Order Specific Question:   Level of Care     Answer:   Med Surg [16]     Order Specific Question:   Estimated length of stay     Answer:   More than 2 Midnights     Order Specific Question:   Certification     Answer:   I certify that inpatient services are medically necessary for this patient for a duration of greater than two midnights. See H&P and MD Progress Notes for additional information about the patient's course of treatment.     ED Arrival Information       Expected   9/8/2024     Arrival   9/8/2024 11:05    Acuity   Emergent              Means of arrival   Walk-In    Escorted by   Family Member    Service   [facility]    Admission type   Emergency              Arrival complaint   Vomiting and fever             Chief Complaint   Patient presents with    Vomiting     Per pt she has been vomiting, diarrhea and having fevers since this past Monday        Initial Presentation: 64 y.o. female with PMHx includes T2DM, essential hypertension, B12 deficiency, mixed hyperlipidemia, osteoarthritis of bilateral knees, who presented on 9/8/24 to [facility] ED due to vomiting and fever for one week, poor PO intake. In the ED, temp 103, tachycardic at 133. EKG ST. Labs revealed WBC 11.29, K 3.4, glucose 282, procal 0.50, lactic acid 2.3. UA positive for blood, protein, lueks, wbc, rbc and bacteria.   CT AP revealed area of hypoattenuation in the midpole of the left kidney may represent pyelonephritis or a lesion.  Given 2 L IVF, Tylenol x1, Zofran x1 and started on IV Zosyn in ED.     Plan:  Admit Inpatient status to med surg dt Sepsis, Pyelonephritis:   fu on urine and blood cxs, start IV  ceftriaxone, Tylenol for fever, Zofran for nausea, fu on CXR, continue IVF. Monet placed in ED, do voiding trial, monitor UOP. Monitor VS and labs. Start accuchecks w/ SSI. PT OT eval. SCDs.     Date: 9/9   Day 2:  Pt endorses some chills last night and nausea and headache this morning. Had a bowel movement last night. Abdominal tenderness in the right upper quadrant and right lower quadrant. There is right CVA tenderness and left CVA tenderness. Continue above tx plan including fu on cxs, monitor VS and labs, continue IV ABX, analgesia and antiemetics prn, accuchecks w/ SSI. DC Monet catheter.     ED Triage Vitals   Temperature Pulse Respirations Blood Pressure SpO2 Pain Score   09/08/24 1110 09/08/24 1110 09/08/24 1110 09/08/24 1110 09/08/24 1110 09/08/24 1107   (!) 103 °F (39.4 °C) (!) 133 20 120/85 98 % 10 - Worst Possible Pain     Weight (last 2 days)       Date/Time Weight    09/08/24 1107 80.7 (178)            Vital Signs (last 3 days)       Date/Time Temp Pulse Resp BP MAP (mmHg) SpO2 O2 Device Patient Position - Orthostatic VS [place] Coma Scale Score Pain    09/09/24 0923 -- -- -- -- -- -- -- -- -- 4    09/09/24 08:06:14 99.6 °F (37.6 °C) 96 12 126/63 84 95 % -- -- -- --    09/09/24 06:25:15 99.6 °F (37.6 °C) 87 -- -- -- 95 % -- -- -- --    09/09/24 00:37:37 101 °F (38.3 °C) 91 -- -- -- 91 % -- -- -- --    09/08/24 2331 -- -- -- -- -- -- -- -- -- Med Not Given for Pain - for MAR use only    09/08/24 22:48:55 102.5 °F (39.2 °C) 101 18 124/63 83 96 % -- -- -- --    09/08/24 2130 -- -- -- -- -- 94 % None (Room air) -- 15 No Pain    09/08/24 18:03:25 100 °F (37.8 °C) 101 18 110/47 68 97 % -- -- -- --    09/08/24 15:29:06 100.7 °F (38.2 °C) 98 -- 104/45 65 91 % -- -- -- --    09/08/24 1526 -- -- -- -- -- -- None (Room air) -- -- No Pain    09/08/24 1300 -- 124 -- 137/62 -- 90 % -- -- 15 --    09/08/24 1230 -- 126 -- 137/62 -- 90 % -- -- 15 --    09/08/24 1200 -- 132 -- 191/83 -- 92 % None (Room air) -- 15 --     09/08/24 1130 -- 132 -- 156/67 -- 91 % -- -- 15 --    09/08/24 1110 103 °F (39.4 °C) 133 20 120/85 90 98 % None (Room air) Sitting -- --    09/08/24 1107 -- -- -- -- -- -- -- -- -- 10 - Worst Possible Pain              Pertinent Labs/Diagnostic Test Results:   Radiology:  CT abdomen pelvis with contrast   Final Interpretation by Laurent Ga MD (09/08 1326)   1.  Area of hypoattenuation in the midpole of the left kidney may represent pyelonephritis or a lesion. Correlation with urinalysis advised. Follow-up contrast-enhanced CT with renal protocol or MRI with intravenous contrast is advised in 2 months to    assess for a lesion.   2.  Punctate focus of air is considered abnormal in the absence of recent catheterization, and can represent infection. Correlation with urinalysis is advised.   3.  No acute gastroenterologic pathology.      The study was marked in EPIC for immediate notification.         Workstation performed: [id number]         XR chest pa & lateral   Final Interpretation by Destiny Clifton MD (09/09 0754)      No acute cardiopulmonary disease.            Workstation performed: [id number]           Cardiology:  ECG 12 lead   Final Result by Marisela Rodriguez MD (09/09 0117)     Age and gender specific ECG analysis     Sinus tachycardia   Otherwise normal ECG   Confirmed by Marisela Rodriguez ([id number]) on 9/9/2024 1:17:03 AM        GI:  No orders to display           Results from last 7 days   Lab Units 09/09/24  0621 09/09/24  0619 09/08/24  1212   WBC Thousand/uL  --  9.63 11.29*   HEMOGLOBIN g/dL  --  11.7 13.9   HEMATOCRIT %  --  34.9 41.4   PLATELETS Thousands/uL 150 145* 182   TOTAL NEUT ABS Thousands/µL  --  7.41 9.25*         Results from last 7 days   Lab Units 09/09/24  0619 09/08/24  1212   SODIUM mmol/L 137 137   POTASSIUM mmol/L 3.3* 3.4*   CHLORIDE mmol/L 100 98   CO2 mmol/L 28 29   ANION GAP mmol/L 9 10   BUN mg/dL 9 11   CREATININE mg/dL 0.68 0.76   EGFR ml/min/1.73sq m 92 82   CALCIUM  mg/dL 8.4 9.7     Results from last 7 days   Lab Units 09/08/24  1212   AST U/L 16   ALT U/L 20   ALK PHOS U/L 79   TOTAL PROTEIN g/dL 7.4   ALBUMIN g/dL 3.9   TOTAL BILIRUBIN mg/dL 0.73     Results from last 7 days   Lab Units 09/09/24  0804 09/08/24  2055 09/08/24  1721 09/08/24  1222   POC GLUCOSE mg/dl 290* 342* 231* 282*     Results from last 7 days   Lab Units 09/09/24  0619 09/08/24  1212   GLUCOSE RANDOM mg/dL 251* 275*         Results from last 7 days   Lab Units 09/08/24  1212   HEMOGLOBIN A1C % 9.7*   EAG mg/dl 232     Results from last 7 days   Lab Units 09/08/24  1212   PROTIME seconds 13.9   INR  1.04   PTT seconds 25         Results from last 7 days   Lab Units 09/08/24  1212   PROCALCITONIN ng/ml 0.50*     Results from last 7 days   Lab Units 09/08/24  1548 09/08/24  1212   LACTIC ACID mmol/L 1.0 2.3*     Results from last 7 days   Lab Units 09/08/24  1212   LIPASE u/L 12     Results from last 7 days   Lab Units 09/08/24  1358 09/08/24  1353   CLARITY UA   --  Turbid   COLOR UA   --  Light Yellow   SPEC GRAV UA   --  1.015   PH UA   --  5.5   GLUCOSE UA mg/dl  --  Negative   KETONES UA mg/dl  --  Negative   BLOOD UA   --  Moderate*   PROTEIN UA mg/dl  --  30 (1+)*   NITRITE UA   --  Negative   BILIRUBIN UA   --  Negative   UROBILINOGEN UA (BE) mg/dl  --  <2.0   LEUKOCYTES UA   --  Large*   WBC UA /hpf Innumerable*  --    RBC UA /hpf 4-10*  --    BACTERIA UA /hpf Moderate*  --    EPITHELIAL CELLS WET PREP /hpf Occasional  --        Results from last 7 days   Lab Units 09/08/24  1212   BLOOD CULTURE  Received in Microbiology Lab. Culture in Progress.  Received in Microbiology Lab. Culture in Progress.     ED Treatment-Medication Administration from 09/08/2024 1105 to 09/08/2024 1516         Date/Time Order Dose Route Action     09/08/2024 1137 ondansetron (ZOFRAN) injection 4 mg 4 mg Intravenous Given     09/08/2024 1209 sodium chloride 0.9 % bolus 1,000 mL 1,000 mL Intravenous New Bag     09/08/2024  1209 acetaminophen (TYLENOL) tablet 975 mg 975 mg Oral Given     09/08/2024 1302 iohexol (OMNIPAQUE) 350 MG/ML injection (MULTI-DOSE) 100 mL 100 mL Intravenous Given     09/08/2024 1434 sodium chloride 0.9 % bolus 1,000 mL 1,000 mL Intravenous New Bag     09/08/2024 1434 piperacillin-tazobactam (ZOSYN) 4.5 g in sodium chloride 0.9 % 100 mL IVPB 4.5 g Intravenous New Bag            Past Medical History:   Diagnosis Date    Diabetes mellitus (HCC)     Hypercholesterolemia     Hypertension     Rheumatoid arthritis (HCC)     Sarcoidosis      Present on Admission:   Diabetes mellitus (HCC)   Essential hypertension   Mixed hyperlipidemia   B12 deficiency      Admitting Diagnosis: UTI (urinary tract infection) [N39.0]  Sepsis (HCC) [A41.9]  Age/Sex: 64 y.o. female  Admission Orders:  Scheduled Medications:  cefTRIAXone, 1,000 mg, Intravenous, Q24H  vitamin B-12, 1,000 mcg, Oral, Daily  enoxaparin, 40 mg, Subcutaneous, Daily  insulin lispro, 1-6 Units, Subcutaneous, 4x Daily (AC & HS)  potassium chloride, 40 mEq, Oral, BID  pravastatin, 80 mg, Oral, Daily With Dinner      Continuous IV Infusions:   sodium chloride 0.9 % bolus 1,000 mL  Dose: 1,000 mL  Freq: Once Route: IV  Last Dose: Stopped (09/08/24 1342)  Start: 09/08/24 1200 End: 09/08/24 1342     PRN Meds:  acetaminophen, 650 mg, Oral, Q6H PRN x2  ondansetron, 4 mg, Intravenous, Q6H PRN        IP CONSULT TO CASE MANAGEMENT    Network Utilization Review Department  ATTENTION: Please call with any questions or concerns to [phone] and carefully listen to the prompts so that you are directed to the right person. All voicemails are confidential.   For Discharge needs, contact Care Management DC Support Team at [phone] opt. [phone]  Send all requests for admission clinical reviews, approved or denied determinations and any other requests to dedicated fax number below belonging to the campus where the patient is receiving treatment. List of dedicated fax numbers for the  Facilities:  FACILITY NAME UR FAX NUMBER   ADMISSION DENIALS (Administrative/Medical Necessity) [phone]   DISCHARGE SUPPORT TEAM (NETWORK) [phone]   PARENT CHILD HEALTH (Maternity/NICU/Pediatrics) [phone]   [facility] [phone]   [facility] [phone]   [facility] [phone]   [facility] [phone]   [facility] [phone]   [facility] [phone]   [facility] [phone]   [facility] [phone]   [facility] [phone]   [facility] [phone]   [facility] [phone]   [facility] [phone]

## 2024-09-09 NOTE — PLAN OF CARE
Problem: PAIN - ADULT  Goal: Verbalizes/displays adequate comfort level or baseline comfort level  Description: Interventions:  - Encourage patient to monitor pain and request assistance  - Assess pain using appropriate pain scale  - Administer analgesics based on type and severity of pain and evaluate response  - Implement non-pharmacological measures as appropriate and evaluate response  - Consider cultural and social influences on pain and pain management  - Notify physician/advanced practitioner if interventions unsuccessful or patient reports new pain  Outcome: Progressing     Problem: INFECTION - ADULT  Goal: Absence or prevention of progression during hospitalization  Description: INTERVENTIONS:  - Assess and monitor for signs and symptoms of infection  - Monitor lab/diagnostic results  - Monitor all insertion sites, i.e. indwelling lines, tubes, and drains  - Monitor endotracheal if appropriate and nasal secretions for changes in amount and color  - [place] appropriate cooling/warming therapies per order  - Administer medications as ordered  - Instruct and encourage patient and family to use good hand hygiene technique  - Identify and instruct in appropriate isolation precautions for identified infection/condition  Outcome: Progressing  Goal: Absence of fever/infection during neutropenic period  Description: INTERVENTIONS:  - Monitor WBC    Outcome: Progressing     Problem: SAFETY ADULT  Goal: Patient will remain free of falls  Description: INTERVENTIONS:  - Educate patient/family on patient safety including physical limitations  - Instruct patient to call for assistance with activity   - Consult OT/PT to assist with strengthening/mobility   - Keep Call bell within reach  - Keep bed low and locked with side rails adjusted as appropriate  - Keep care items and personal belongings within reach  - Initiate and maintain comfort rounds  - Make Fall Risk Sign visible to staff  - Offer Toileting every 2 Hours,  in advance of need  - Initiate/Maintain bed alarm  - Obtain necessary fall risk management equipment:   - Apply yellow socks and bracelet for high fall risk patients  - Consider moving patient to room near nurses station  Outcome: Progressing  Goal: Maintain or return to baseline ADL function  Description: INTERVENTIONS:  -  Assess patient's ability to carry out ADLs; assess patient's baseline for ADL function and identify physical deficits which impact ability to perform ADLs (bathing, care of mouth/teeth, toileting, grooming, dressing, etc.)  - Assess/evaluate cause of self-care deficits   - Assess range of motion  - Assess patient's mobility; develop plan if impaired  - Assess patient's need for assistive devices and provide as appropriate  - Encourage maximum independence but intervene and supervise when necessary  - Involve family in performance of ADLs  - Assess for home care needs following discharge   - Consider OT consult to assist with ADL evaluation and planning for discharge  - Provide patient education as appropriate  Outcome: Progressing  Goal: Maintains/Returns to pre admission functional level  Description: INTERVENTIONS:  - Perform AM-PAC 6 Click Basic Mobility/ Daily Activity assessment daily.  - Set and communicate daily mobility goal to care team and patient/family/caregiver.   - Collaborate with rehabilitation services on mobility goals if consulted  - Perform Range of Motion  times a day.  - Reposition patient every  hours.  - Dangle patient  times a day  - Stand patient 2 times a day  - Ambulate patient 2 times a day  - Out of bed to chair 2 times a day   - Out of bed for meals 2 times a day  - Out of bed for toileting  - Record patient progress and toleration of activity level   Outcome: Progressing     Problem: DISCHARGE PLANNING  Goal: Discharge to home or other facility with appropriate resources  Description: INTERVENTIONS:  - Identify barriers to discharge w/patient and caregiver  -  Arrange for needed discharge resources and transportation as appropriate  - Identify discharge learning needs (meds, wound care, etc.)  - Arrange for interpretive services to assist at discharge as needed  - Refer to Case Management Department for coordinating discharge planning if the patient needs post-hospital services based on physician/advanced practitioner order or complex needs related to functional status, cognitive ability, or social support system  Outcome: Progressing     Problem: Knowledge Deficit  Goal: Patient/family/caregiver demonstrates understanding of disease process, treatment plan, medications, and discharge instructions  Description: Complete learning assessment and assess knowledge base.  Interventions:  - Provide teaching at level of understanding  - Provide teaching via preferred learning methods  Outcome: Progressing     Problem: Nutrition/Hydration-ADULT  Goal: Nutrient/Hydration intake appropriate for improving, restoring or maintaining nutritional needs  Description: Monitor and assess patient's nutrition/hydration status for malnutrition. Collaborate with interdisciplinary team and initiate plan and interventions as ordered.  Monitor patient's weight and dietary intake as ordered or per policy. Utilize nutrition screening tool and intervene as necessary. Determine patient's food preferences and provide high-protein, high-caloric foods as appropriate.     INTERVENTIONS:  - Monitor oral intake, urinary output, labs, and treatment plans  - Assess nutrition and hydration status and recommend course of action  - Evaluate amount of meals eaten  - Assist patient with eating if necessary   - Allow adequate time for meals  - Recommend/ encourage appropriate diets, oral nutritional supplements, and vitamin/mineral supplements  - Order, calculate, and assess calorie counts as needed  - Recommend, monitor, and adjust tube feedings and TPN/PPN based on assessed needs  - Assess need for intravenous  fluids  - Provide specific nutrition/hydration education as appropriate  - Include patient/family/caregiver in decisions related to nutrition  Outcome: Progressing     Problem: NEUROSENSORY - ADULT  Goal: Achieves stable or improved neurological status  Description: INTERVENTIONS  - Monitor and report changes in neurological status  - Monitor vital signs such as temperature, blood pressure, glucose, and any other labs ordered   - Initiate measures to prevent increased intracranial pressure  - Monitor for seizure activity and implement precautions if appropriate      Outcome: Progressing  Goal: Remains free of injury related to seizures activity  Description: INTERVENTIONS  - Maintain airway, patient safety  and administer oxygen as ordered  - Monitor patient for seizure activity, document and report duration and description of seizure to physician/advanced practitioner  - If seizure occurs,  ensure patient safety during seizure  - Reorient patient post seizure  - Seizure pads on all 4 side rails  - Instruct patient/family to notify RN of any seizure activity including if an aura is experienced  - Instruct patient/family to call for assistance with activity based on nursing assessment  - Administer anti-seizure medications if ordered    Outcome: Progressing  Goal: Achieves maximal functionality and self care  Description: INTERVENTIONS  - Monitor swallowing and airway patency with patient fatigue and changes in neurological status  - Encourage and assist patient to increase activity and self care.   - Encourage visually impaired, hearing impaired and aphasic patients to use assistive/communication devices  Outcome: Progressing     Problem: CARDIOVASCULAR - ADULT  Goal: Maintains optimal cardiac output and hemodynamic stability  Description: INTERVENTIONS:  - Monitor I/O, vital signs and rhythm  - Monitor for S/S and trends of decreased cardiac output  - Administer and titrate ordered vasoactive medications to  optimize hemodynamic stability  - Assess quality of pulses, skin color and temperature  - Assess for signs of decreased coronary artery perfusion  - Instruct patient to report change in severity of symptoms  Outcome: Progressing  Goal: Absence of cardiac dysrhythmias or at baseline rhythm  Description: INTERVENTIONS:  - Continuous cardiac monitoring, vital signs, obtain 12 lead EKG if ordered  - Administer antiarrhythmic and heart rate control medications as ordered  - Monitor electrolytes and administer replacement therapy as ordered  Outcome: Progressing     Problem: RESPIRATORY - ADULT  Goal: Achieves optimal ventilation and oxygenation  Description: INTERVENTIONS:  - Assess for changes in respiratory status  - Assess for changes in mentation and behavior  - Position to facilitate oxygenation and minimize respiratory effort  - Oxygen administered by appropriate delivery if ordered  - Initiate smoking cessation education as indicated  - Encourage broncho-pulmonary hygiene including cough, deep breathe, Incentive Spirometry  - Assess the need for suctioning and aspirate as needed  - Assess and instruct to report SOB or any respiratory difficulty  - Respiratory Therapy support as indicated  Outcome: Progressing     Problem: GASTROINTESTINAL - ADULT  Goal: Minimal or absence of nausea and/or vomiting  Description: INTERVENTIONS:  - Administer IV fluids if ordered to ensure adequate hydration  - Maintain NPO status until nausea and vomiting are resolved  - Nasogastric tube if ordered  - Administer ordered antiemetic medications as needed  - Provide nonpharmacologic comfort measures as appropriate  - Advance diet as tolerated, if ordered  - Consider nutrition services referral to assist patient with adequate nutrition and appropriate food choices  Outcome: Progressing  Goal: Maintains or returns to baseline bowel function  Description: INTERVENTIONS:  - Assess bowel function  - Encourage oral fluids to ensure adequate  hydration  - Administer IV fluids if ordered to ensure adequate hydration  - Administer ordered medications as needed  - Encourage mobilization and activity  - Consider nutritional services referral to assist patient with adequate nutrition and appropriate food choices  Outcome: Progressing  Goal: Maintains adequate nutritional intake  Description: INTERVENTIONS:  - Monitor percentage of each meal consumed  - Identify factors contributing to decreased intake, treat as appropriate  - Assist with meals as needed  - Monitor I&O, weight, and lab values if indicated  - Obtain nutrition services referral as needed  Outcome: Progressing  Goal: Establish and maintain optimal ostomy function  Description: INTERVENTIONS:  - Assess bowel function  - Encourage oral fluids to ensure adequate hydration  - Administer IV fluids if ordered to ensure adequate hydration   - Administer ordered medications as needed  - Encourage mobilization and activity  - Nutrition services referral to assist patient with appropriate food choices  - Assess stoma site  - Consider wound care consult   Outcome: Progressing  Goal: Oral mucous membranes remain intact  Description: INTERVENTIONS  - Assess oral mucosa and hygiene practices  - Implement preventative oral hygiene regimen  - Implement oral medicated treatments as ordered  - Initiate Nutrition services referral as needed  Outcome: Progressing     Problem: GENITOURINARY - ADULT  Goal: Maintains or returns to baseline urinary function  Description: INTERVENTIONS:  - Assess urinary function  - Encourage oral fluids to ensure adequate hydration if ordered  - Administer IV fluids as ordered to ensure adequate hydration  - Administer ordered medications as needed  - Offer frequent toileting  - Follow urinary retention protocol if ordered  Outcome: Progressing  Goal: Absence of urinary retention  Description: INTERVENTIONS:  - Assess patient’s ability to void and empty bladder  - Monitor I/O  - Bladder  scan as needed  - Discuss with physician/AP medications to alleviate retention as needed  - Discuss catheterization for long term situations as appropriate  Outcome: Progressing  Goal: Urinary catheter remains patent  Description: INTERVENTIONS:  - Assess patency of urinary catheter  - If patient has a chronic santiago, consider changing catheter if non-functioning  - Follow guidelines for intermittent irrigation of non-functioning urinary catheter  Outcome: Progressing     Problem: METABOLIC, FLUID AND ELECTROLYTES - ADULT  Goal: Electrolytes maintained within normal limits  Description: INTERVENTIONS:  - Monitor labs and assess patient for signs and symptoms of electrolyte imbalances  - Administer electrolyte replacement as ordered  - Monitor response to electrolyte replacements, including repeat lab results as appropriate  - Instruct patient on fluid and nutrition as appropriate  Outcome: Progressing  Goal: Fluid balance maintained  Description: INTERVENTIONS:  - Monitor labs   - Monitor I/O and WT  - Instruct patient on fluid and nutrition as appropriate  - Assess for signs & symptoms of volume excess or deficit  Outcome: Progressing  Goal: Glucose maintained within target range  Description: INTERVENTIONS:  - Monitor Blood Glucose as ordered  - Assess for signs and symptoms of hyperglycemia and hypoglycemia  - Administer ordered medications to maintain glucose within target range  - Assess nutritional intake and initiate nutrition service referral as needed  Outcome: Progressing     Problem: SKIN/TISSUE INTEGRITY - ADULT  Goal: Skin Integrity remains intact(Skin Breakdown Prevention)  Description: Assess:  -Perform El assessment every   -Clean and moisturize skin every   -Inspect skin when repositioning, toileting, and assisting with ADLS  -Assess under medical devices such as  every   -Assess extremities for adequate circulation and sensation     Bed Management:  -Have minimal linens on bed & keep smooth,  unwrinkled  -Change linens as needed when moist or perspiring  -Avoid sitting or lying in one position for more than  hours while in bed  -Keep HOB at degrees     Toileting:  -Offer bedside commode  -Assess for incontinence every   -Use incontinent care products after each incontinent episode such as     Activity:  -Mobilize patient  times a day  -Encourage activity and walks on unit  -Encourage or provide ROM exercises   -Turn and reposition patient every  Hours  -Use appropriate equipment to lift or move patient in bed  -Instruct/ Assist with weight shifting every  when out of bed in chair  -Consider limitation of chair time  hour intervals    Skin Care:  -Avoid use of baby powder, tape, friction and shearing, hot water or constrictive clothing  -Relieve pressure over bony prominences using  -Do not massage red bony areas    Next Steps:  -Teach patient strategies to minimize risks such as    -Consider consults to  interdisciplinary teams such as   Outcome: Progressing  Goal: Incision(s), wounds(s) or drain site(s) healing without S/S of infection  Description: INTERVENTIONS  - Assess and document dressing, incision, wound bed, drain sites and surrounding tissue  - Provide patient and family education  - Perform skin care/dressing changes every   Outcome: Progressing  Goal: Pressure injury heals and does not worsen  Description: Interventions:  - Implement low air loss mattress or specialty surface (Criteria met)  - Apply silicone foam dressing  - Instruct/assist with weight shifting every  minutes when in chair   - Limit chair time to  hour intervals  - Use special pressure reducing interventions such as  when in chair   - Apply fecal or urinary incontinence containment device   - Perform passive or active ROM every   - Turn and reposition patient & offload bony prominences every  hours   - Utilize friction reducing device or surface for transfers   - Consider consults to  interdisciplinary teams such as   - Use  incontinent care products after each incontinent episode such as   - Consider nutrition services referral as needed  Outcome: Progressing     Problem: HEMATOLOGIC - ADULT  Goal: Maintains hematologic stability  Description: INTERVENTIONS  - Assess for signs and symptoms of bleeding or hemorrhage  - Monitor labs  - Administer supportive blood products/factors as ordered and appropriate  Outcome: Progressing     Problem: MUSCULOSKELETAL - ADULT  Goal: Maintain or return mobility to safest level of function  Description: INTERVENTIONS:  - Assess patient's ability to carry out ADLs; assess patient's baseline for ADL function and identify physical deficits which impact ability to perform ADLs (bathing, care of mouth/teeth, toileting, grooming, dressing, etc.)  - Assess/evaluate cause of self-care deficits   - Assess range of motion  - Assess patient's mobility  - Assess patient's need for assistive devices and provide as appropriate  - Encourage maximum independence but intervene and supervise when necessary  - Involve family in performance of ADLs  - Assess for home care needs following discharge   - Consider OT consult to assist with ADL evaluation and planning for discharge  - Provide patient education as appropriate  Outcome: Progressing  Goal: Maintain proper alignment of affected body part  Description: INTERVENTIONS:  - Support, maintain and protect limb and body alignment  - Provide patient/ family with appropriate education  Outcome: Progressing

## 2024-09-09 NOTE — PLAN OF CARE
Problem: PAIN - ADULT  Goal: Verbalizes/displays adequate comfort level or baseline comfort level  Description: Interventions:  - Encourage patient to monitor pain and request assistance  - Assess pain using appropriate pain scale  - Administer analgesics based on type and severity of pain and evaluate response  - Implement non-pharmacological measures as appropriate and evaluate response  - Consider cultural and social influences on pain and pain management  - Notify physician/advanced practitioner if interventions unsuccessful or patient reports new pain  Outcome: Progressing     Problem: INFECTION - ADULT  Goal: Absence or prevention of progression during hospitalization  Description: INTERVENTIONS:  - Assess and monitor for signs and symptoms of infection  - Monitor lab/diagnostic results  - Monitor all insertion sites, i.e. indwelling lines, tubes, and drains  - Monitor endotracheal if appropriate and nasal secretions for changes in amount and color  - [place] appropriate cooling/warming therapies per order  - Administer medications as ordered  - Instruct and encourage patient and family to use good hand hygiene technique  - Identify and instruct in appropriate isolation precautions for identified infection/condition  Outcome: Progressing  Goal: Absence of fever/infection during neutropenic period  Description: INTERVENTIONS:  - Monitor WBC    Outcome: Progressing     Problem: SAFETY ADULT  Goal: Patient will remain free of falls  Description: INTERVENTIONS:  - Educate patient/family on patient safety including physical limitations  - Instruct patient to call for assistance with activity   - Consult OT/PT to assist with strengthening/mobility   - Keep Call bell within reach  - Keep bed low and locked with side rails adjusted as appropriate  - Keep care items and personal belongings within reach  - Initiate and maintain comfort rounds  - Make Fall Risk Sign visible to staff  - Apply yellow socks and bracelet  for high fall risk patients  - Consider moving patient to room near nurses station  Outcome: Progressing  Goal: Maintain or return to baseline ADL function  Description: INTERVENTIONS:  -  Assess patient's ability to carry out ADLs; assess patient's baseline for ADL function and identify physical deficits which impact ability to perform ADLs (bathing, care of mouth/teeth, toileting, grooming, dressing, etc.)  - Assess/evaluate cause of self-care deficits   - Assess range of motion  - Assess patient's mobility; develop plan if impaired  - Assess patient's need for assistive devices and provide as appropriate  - Encourage maximum independence but intervene and supervise when necessary  - Involve family in performance of ADLs  - Assess for home care needs following discharge   - Consider OT consult to assist with ADL evaluation and planning for discharge  - Provide patient education as appropriate  Outcome: Progressing  Goal: Maintains/Returns to pre admission functional level  Description: INTERVENTIONS:  - Perform AM-PAC 6 Click Basic Mobility/ Daily Activity assessment daily.  - Set and communicate daily mobility goal to care team and patient/family/caregiver.   - Collaborate with rehabilitation services on mobility goals if consulted  - Perform Range of Motion 3 times a day.  - Reposition patient every 2 hours.  - Dangle patient 3 times a day  - Stand patient 3 times a day  - Ambulate patient 3 times a day  - Out of bed to chair 3 times a day   - Out of bed for meals 3 times a day  - Out of bed for toileting  - Record patient progress and toleration of activity level   Outcome: Progressing     Problem: DISCHARGE PLANNING  Goal: Discharge to home or other facility with appropriate resources  Description: INTERVENTIONS:  - Identify barriers to discharge w/patient and caregiver  - Arrange for needed discharge resources and transportation as appropriate  - Identify discharge learning needs (meds, wound care, etc.)  -  Arrange for interpretive services to assist at discharge as needed  - Refer to Case Management Department for coordinating discharge planning if the patient needs post-hospital services based on physician/advanced practitioner order or complex needs related to functional status, cognitive ability, or social support system  Outcome: Progressing     Problem: Knowledge Deficit  Goal: Patient/family/caregiver demonstrates understanding of disease process, treatment plan, medications, and discharge instructions  Description: Complete learning assessment and assess knowledge base.  Interventions:  - Provide teaching at level of understanding  - Provide teaching via preferred learning methods  Outcome: Progressing     Problem: Nutrition/Hydration-ADULT  Goal: Nutrient/Hydration intake appropriate for improving, restoring or maintaining nutritional needs  Description: Monitor and assess patient's nutrition/hydration status for malnutrition. Collaborate with interdisciplinary team and initiate plan and interventions as ordered.  Monitor patient's weight and dietary intake as ordered or per policy. Utilize nutrition screening tool and intervene as necessary. Determine patient's food preferences and provide high-protein, high-caloric foods as appropriate.     INTERVENTIONS:  - Monitor oral intake, urinary output, labs, and treatment plans  - Assess nutrition and hydration status and recommend course of action  - Evaluate amount of meals eaten  - Assist patient with eating if necessary   - Allow adequate time for meals  - Recommend/ encourage appropriate diets, oral nutritional supplements, and vitamin/mineral supplements  - Order, calculate, and assess calorie counts as needed  - Recommend, monitor, and adjust tube feedings and TPN/PPN based on assessed needs  - Assess need for intravenous fluids  - Provide specific nutrition/hydration education as appropriate  - Include patient/family/caregiver in decisions related to  nutrition  Outcome: Progressing     Problem: NEUROSENSORY - ADULT  Goal: Achieves stable or improved neurological status  Description: INTERVENTIONS  - Monitor and report changes in neurological status  - Monitor vital signs such as temperature, blood pressure, glucose, and any other labs ordered   - Initiate measures to prevent increased intracranial pressure  - Monitor for seizure activity and implement precautions if appropriate      Outcome: Progressing  Goal: Remains free of injury related to seizures activity  Description: INTERVENTIONS  - Maintain airway, patient safety  and administer oxygen as ordered  - Monitor patient for seizure activity, document and report duration and description of seizure to physician/advanced practitioner  - If seizure occurs,  ensure patient safety during seizure  - Reorient patient post seizure  - Seizure pads on all 4 side rails  - Instruct patient/family to notify RN of any seizure activity including if an aura is experienced  - Instruct patient/family to call for assistance with activity based on nursing assessment  - Administer anti-seizure medications if ordered    Outcome: Progressing  Goal: Achieves maximal functionality and self care  Description: INTERVENTIONS  - Monitor swallowing and airway patency with patient fatigue and changes in neurological status  - Encourage and assist patient to increase activity and self care.   - Encourage visually impaired, hearing impaired and aphasic patients to use assistive/communication devices  Outcome: Progressing     Problem: CARDIOVASCULAR - ADULT  Goal: Maintains optimal cardiac output and hemodynamic stability  Description: INTERVENTIONS:  - Monitor I/O, vital signs and rhythm  - Monitor for S/S and trends of decreased cardiac output  - Administer and titrate ordered vasoactive medications to optimize hemodynamic stability  - Assess quality of pulses, skin color and temperature  - Assess for signs of decreased coronary artery  perfusion  - Instruct patient to report change in severity of symptoms  Outcome: Progressing  Goal: Absence of cardiac dysrhythmias or at baseline rhythm  Description: INTERVENTIONS:  - Continuous cardiac monitoring, vital signs, obtain 12 lead EKG if ordered  - Administer antiarrhythmic and heart rate control medications as ordered  - Monitor electrolytes and administer replacement therapy as ordered  Outcome: Progressing     Problem: RESPIRATORY - ADULT  Goal: Achieves optimal ventilation and oxygenation  Description: INTERVENTIONS:  - Assess for changes in respiratory status  - Assess for changes in mentation and behavior  - Position to facilitate oxygenation and minimize respiratory effort  - Oxygen administered by appropriate delivery if ordered  - Initiate smoking cessation education as indicated  - Encourage broncho-pulmonary hygiene including cough, deep breathe, Incentive Spirometry  - Assess the need for suctioning and aspirate as needed  - Assess and instruct to report SOB or any respiratory difficulty  - Respiratory Therapy support as indicated  Outcome: Progressing     Problem: GASTROINTESTINAL - ADULT  Goal: Minimal or absence of nausea and/or vomiting  Description: INTERVENTIONS:  - Administer IV fluids if ordered to ensure adequate hydration  - Maintain NPO status until nausea and vomiting are resolved  - Nasogastric tube if ordered  - Administer ordered antiemetic medications as needed  - Provide nonpharmacologic comfort measures as appropriate  - Advance diet as tolerated, if ordered  - Consider nutrition services referral to assist patient with adequate nutrition and appropriate food choices  Outcome: Progressing  Goal: Maintains or returns to baseline bowel function  Description: INTERVENTIONS:  - Assess bowel function  - Encourage oral fluids to ensure adequate hydration  - Administer IV fluids if ordered to ensure adequate hydration  - Administer ordered medications as needed  - Encourage  mobilization and activity  - Consider nutritional services referral to assist patient with adequate nutrition and appropriate food choices  Outcome: Progressing  Goal: Maintains adequate nutritional intake  Description: INTERVENTIONS:  - Monitor percentage of each meal consumed  - Identify factors contributing to decreased intake, treat as appropriate  - Assist with meals as needed  - Monitor I&O, weight, and lab values if indicated  - Obtain nutrition services referral as needed  Outcome: Progressing  Goal: Establish and maintain optimal ostomy function  Description: INTERVENTIONS:  - Assess bowel function  - Encourage oral fluids to ensure adequate hydration  - Administer IV fluids if ordered to ensure adequate hydration   - Administer ordered medications as needed  - Encourage mobilization and activity  - Nutrition services referral to assist patient with appropriate food choices  - Assess stoma site  - Consider wound care consult   Outcome: Progressing  Goal: Oral mucous membranes remain intact  Description: INTERVENTIONS  - Assess oral mucosa and hygiene practices  - Implement preventative oral hygiene regimen  - Implement oral medicated treatments as ordered  - Initiate Nutrition services referral as needed  Outcome: Progressing     Problem: GENITOURINARY - ADULT  Goal: Maintains or returns to baseline urinary function  Description: INTERVENTIONS:  - Assess urinary function  - Encourage oral fluids to ensure adequate hydration if ordered  - Administer IV fluids as ordered to ensure adequate hydration  - Administer ordered medications as needed  - Offer frequent toileting  - Follow urinary retention protocol if ordered  Outcome: Progressing  Goal: Absence of urinary retention  Description: INTERVENTIONS:  - Assess patient’s ability to void and empty bladder  - Monitor I/O  - Bladder scan as needed  - Discuss with physician/AP medications to alleviate retention as needed  - Discuss catheterization for long term  situations as appropriate  Outcome: Progressing  Goal: Urinary catheter remains patent  Description: INTERVENTIONS:  - Assess patency of urinary catheter  - If patient has a chronic santiago, consider changing catheter if non-functioning  - Follow guidelines for intermittent irrigation of non-functioning urinary catheter  Outcome: Progressing     Problem: METABOLIC, FLUID AND ELECTROLYTES - ADULT  Goal: Electrolytes maintained within normal limits  Description: INTERVENTIONS:  - Monitor labs and assess patient for signs and symptoms of electrolyte imbalances  - Administer electrolyte replacement as ordered  - Monitor response to electrolyte replacements, including repeat lab results as appropriate  - Instruct patient on fluid and nutrition as appropriate  Outcome: Progressing  Goal: Fluid balance maintained  Description: INTERVENTIONS:  - Monitor labs   - Monitor I/O and WT  - Instruct patient on fluid and nutrition as appropriate  - Assess for signs & symptoms of volume excess or deficit  Outcome: Progressing  Goal: Glucose maintained within target range  Description: INTERVENTIONS:  - Monitor Blood Glucose as ordered  - Assess for signs and symptoms of hyperglycemia and hypoglycemia  - Administer ordered medications to maintain glucose within target range  - Assess nutritional intake and initiate nutrition service referral as needed  Outcome: Progressing       Problem: HEMATOLOGIC - ADULT  Goal: Maintains hematologic stability  Description: INTERVENTIONS  - Assess for signs and symptoms of bleeding or hemorrhage  - Monitor labs  - Administer supportive blood products/factors as ordered and appropriate  Outcome: Progressing     Problem: MUSCULOSKELETAL - ADULT  Goal: Maintain or return mobility to safest level of function  Description: INTERVENTIONS:  - Assess patient's ability to carry out ADLs; assess patient's baseline for ADL function and identify physical deficits which impact ability to perform ADLs (bathing,  care of mouth/teeth, toileting, grooming, dressing, etc.)  - Assess/evaluate cause of self-care deficits   - Assess range of motion  - Assess patient's mobility  - Assess patient's need for assistive devices and provide as appropriate  - Encourage maximum independence but intervene and supervise when necessary  - Involve family in performance of ADLs  - Assess for home care needs following discharge   - Consider OT consult to assist with ADL evaluation and planning for discharge  - Provide patient education as appropriate  Outcome: Progressing  Goal: Maintain proper alignment of affected body part  Description: INTERVENTIONS:  - Support, maintain and protect limb and body alignment  - Provide patient/ family with appropriate education  Outcome: Progressing

## 2024-09-09 NOTE — OCCUPATIONAL THERAPY NOTE
"  Occupational Therapy Evaluation      Michelle Rosa    9/9/2024    Principal Problem:    Sepsis (HCC)  Active Problems:    Diabetes mellitus (HCC)    Essential hypertension    Mixed hyperlipidemia    B12 deficiency    Pyelonephritis      Past Medical History:   Diagnosis Date    Diabetes mellitus (HCC)     Hypercholesterolemia     Hypertension     Rheumatoid arthritis (HCC)     Sarcoidosis        Past Surgical History:   Procedure Laterality Date    CHOLECYSTECTOMY      COLONOSCOPY  05/15/2009    Abnormal     KNEE SURGERY Left 10/2022    MAMMO (HISTORICAL)  09/30/2016    Negative         09/09/24 1220   OT Last Visit   OT Visit Date 09/09/24   Note Type   Note type Evaluation   Pain Assessment   Pain Assessment Tool 0-10   Pain Score 4   Pain Location/Orientation Orientation: Bilateral;Location: Knee   Restrictions/Precautions   Weight Bearing Precautions Per Order No   Other Precautions Pain   Home Living   Type of Home House   Home Layout One level;Laundry in basement;Stairs to enter with rails   Bathroom Shower/Tub Walk-in shower   Bathroom Toilet Standard   Bathroom Equipment Grab bars in shower   Home Equipment Walker  (did not use)   Additional Comments pt reports living in a one story home w basement in which she doesn't have to use. there are approximately 10 JUANA, and then she has one floor set up   Prior Function   Level of [place] Independent with ADLs;Independent with functional mobility   Lives With Spouse;Family  (18 y/o grand son)   IADLs Independent with driving;Independent with meal prep;Independent with medication management   Falls in the last 6 months 0   Vocational Full time employment   Comments pt works full time in the laundry department at [employer]   Lifestyle   Autonomy pt reports being fully independent at baseline. share home managing activites w spouse   Reciprocal Relationships supportive family   Subjective   Subjective \"my knees have been hurting even though I had one of " "them replaced\"   ADL   Eating Assistance 7  Independent   Grooming Assistance 7  Independent   UB Bathing Assistance 7  Independent   LB Bathing Assistance 5  Supervision/Setup   LB Bathing Deficit Increased time to complete   UB Dressing Assistance 5  Supervision/Setup   LB Dressing Assistance 5  Supervision/Setup   Toileting Assistance  5  Supervision/Setup   Transfers   Sit to Stand 5  Supervision   Stand to Sit 5  Supervision   Stand pivot 5  Supervision   Toilet transfer 5  Supervision   Functional Mobility   Functional Mobility 5  Supervision   Balance   Static Sitting Good   Dynamic Sitting Fair +   Static Standing Fair   Dynamic Standing Fair -   Activity Tolerance   Activity Tolerance Patient tolerated treatment well   Medical Staff Made Aware PT [place]   RUE Assessment   RUE Assessment WFL   LUE Assessment   LUE Assessment WFL   Hand Function   Gross Motor Coordination Functional   Fine Motor Coordination Functional   Vision - Complex Assessment   Tracking Intact   Psychosocial   Psychosocial (WDL) WDL   Perception   Inattention/Neglect Appears intact   Cognition   Overall Cognitive Status WFL   Arousal/Participation Alert;Cooperative   Attention Within functional limits   Orientation Level Oriented X4   Memory Within functional limits   Following Commands Follows all commands and directions without difficulty   Assessment   Assessment Pt is a 64 y.o. female seen for OT evaluation s/p admit to [facility] on 9/8/2024 w/ Sepsis ([facility]). She  has a past medical history of Diabetes mellitus ([facility]), Hypercholesterolemia, Hypertension, Rheumatoid arthritis ([facility]), and Sarcoidosis. Hx of Knee replacement. Personal factors affecting pt at time of IE include:steps to enter environment. Prior to admission, pt was independent w self care, mobility, driving and working. Upon evaluation: Pt requires no assist w ADL's, nor mobility. She appears to have good safety awarenss. She was educated on ECT/self pacing " skills, fall prevention strategies w good understanding.  Based on findings from OT evaluation and functional performance deficits, pt has been identified as a  moderate complexity evaluation. The patient's raw score on the AM-PAC Daily Activity Inpatient Short Form is 24. A raw score of greater than or equal to 19 suggests the patient may benefit from discharge to home. Please refer to the recommendation of the Occupational Therapist for safe discharge planning From OT standpoint, recommendation at time of d/c would be HOME w no ongoing skilled OT needs identified at this time. DC OT.   Plan   OT Frequency Eval only   Discharge Recommendation   Rehab Resource Intensity Level, OT No post-acute rehabilitation needs   AM-PAC Daily Activity Inpatient   Lower Body Dressing 4   Bathing 4   Toileting 4   Upper Body Dressing 4   Grooming 4   Eating 4   Daily Activity Raw Score 24   Daily Activity Standardized Score (Calc for Raw Score >=11) 57.54

## 2024-09-09 NOTE — CASE MANAGEMENT
Case Management Assessment & Discharge Planning Note    Patient name Michelle Rosa  Location [facility] 828-01 MRN [id number]  : 1960 Date 2024       Current Admission Date: 2024  Current Admission Diagnosis:Sepsis (HCC)   Patient Active Problem List    Diagnosis Date Noted Date Diagnosed    Sepsis (HCC) 2024     Pyelonephritis 2024     Nontraumatic tear of left supraspinatus tendon 2024     Rotator cuff arthropathy of left shoulder 2024     B12 deficiency 2024     Supraclavicular mass 05/10/2023     Neck swelling 05/10/2023     Primary osteoarthritis of left knee 2022     Status post left knee replacement 2022     Rotator cuff tendonitis, left 2022     Nausea 2021     Mixed hyperlipidemia 2021     History of COVID-19 2021     Essential hypertension, benign 2021     Primary osteoarthritis of both knees 2021     Varicose veins of both lower extremities with pain 2021     Uncontrolled type 2 diabetes mellitus with hyperglycemia (HCC) 2021     Moderate persistent asthma without complication 2020     Allergic rhinitis 2020     Asthma 2020     Sarcoidosis 2020     Thalassemia 2020     Vitamin D deficiency 2020     Diabetes mellitus (HCC) 2019     Essential hypertension 2019       LOS (days): 1  Geometric Mean LOS (GMLOS) (days):   Days to GMLOS:     OBJECTIVE:    Risk of Unplanned Readmission Score: 6.48         Current admission status: Inpatient       Preferred Pharmacy:   [id number] - [place], [place] - [address]  [address]  [place] [address]  Phone: [phone] Fax: [phone]    Primary Care Provider: Fausto Jimenez MD    Primary Insurance: [employer]  Secondary Insurance:     ASSESSMENT:  Active Health Care Proxies    There are no active Health Care Proxies on file.                 Readmission Root Cause  30 Day  Readmission: No    Patient Information  Admitted from:: Home  Mental Status: Alert  During Assessment patient was accompanied by: Son  Assessment information provided by:: Patient, Son  Primary Caregiver: Self  Support Systems: Self, Spouse/significant other, Children  County of Residence: [place]  What city do you live in?: [place]  Home entry access options. Select all that apply.: Stairs  Number of steps to enter home.: One Flight  Do the steps have railings?: Yes  Type of Current Residence: [facility]  Living Arrangements: Lives w/ Spouse/significant other, Lives w/ Son  Is patient a ?: No    Activities of Daily Living Prior to Admission  Functional Status: Independent  Completes ADLs independently?: Yes  Ambulates independently?: Yes  Does patient use assisted devices?: No  Does patient currently own DME?: Yes  What DME does the patient currently own?: Walker  Does patient have a history of Outpatient Therapy (PT/OT)?: Yes  Does the patient have a history of Short-Term Rehab?: No  Does patient have a history of HHC?: No  Does patient currently have HHC?: No         Patient Information Continued  Income Source: Employed  Does patient have prescription coverage?: Yes  Does patient receive dialysis treatments?: No  Does patient have a history of substance abuse?: No  Does patient have a history of Mental Health Diagnosis?: No         Means of Transportation  Means of Transport to Appts:: Drives Self      Social Determinants of Health (SDOH)      Flowsheet Row Most Recent Value   Housing Stability    In the last 12 months, was there a time when you were not able to pay the mortgage or rent on time? N   In the past 12 months, how many times have you moved where you were living? 0   At any time in the past 12 months, were you homeless or living in a shelter (including now)? N   Transportation Needs    In the past 12 months, has lack of transportation kept you from medical appointments or from getting  medications? no   In the past 12 months, has lack of transportation kept you from meetings, work, or from getting things needed for daily living? No   Food Insecurity    Within the past 12 months, you worried that your food would run out before you got the money to buy more. Never true   Within the past 12 months, the food you bought just didn't last and you didn't have money to get more. Never true   Utilities    In the past 12 months has the electric, gas, oil, or water company threatened to shut off services in your home? No            DISCHARGE DETAILS:    Discharge planning discussed with:: patient, son and daughter in law at bedside  Freedom of Choice: Yes     CM contacted family/caregiver?: No- see comments (pt AAO, family at bedside)  Were Treatment Team discharge recommendations reviewed with patient/caregiver?: Yes  Did patient/caregiver verbalize understanding of patient care needs?: Yes  Were patient/caregiver advised of the risks associated with not following Treatment Team discharge recommendations?: Yes    Contacts  Patient Contacts: Spouse- Elbert  Relationship to Patient:: Family  Contact Method: Phone  Phone Number: [phone]  Reason/Outcome: Continuity of Care, Emergency Contact, Discharge Planning               CM introduced self and role to patient and family members at bedside   Pt resides with spouse, son and grandchild in ranch style home ( [facility])   Pt independent with all ADLs and ambulation, does own a walker if needed  Hx of OPPT, no prior hx of STR or HHC  Drives and is employed FT. Family will transport home upon dc  CM to continue to follow    Patient/caregiver received discharge checklist.  Content reviewed.  Patient/caregiver encouraged to participate in discharge plan of care prior to discharge home.  CM reviewed d/c planning process including the following: identifying help at home, patient preference for d/c planning needs, Discharge Lounge, [facility] Meds to Bed program,  availability of treatment team to discuss questions or concerns patient and/or family may have regarding understanding medications and recognizing signs and symptoms once discharged.  CM also encouraged patient to follow up with all recommended appointments after discharge. Patient advised of importance for patient and family to participate in managing patient’s medical well being.

## 2024-09-09 NOTE — PROGRESS NOTES
INTERNAL MEDICINE RESIDENCY PROGRESS NOTE     Name: Michelle Rosa   Age & Sex: 64 y.o. female   MRN: [id number]  Unit/Bed#: [facility] 828-01   Encounter: [id number]  Team: SOD Team B     PATIENT INFORMATION     Name: Michelle Rosa   Age & Sex: 64 y.o. female   MRN: [id number]  Hospital Stay Days: 1    ASSESSMENT/PLAN     Principal Problem:    Sepsis (HCC)  Active Problems:    Pyelonephritis    Diabetes mellitus (HCC)    Essential hypertension    Mixed hyperlipidemia    B12 deficiency      Pyelonephritis  Assessment & Plan  CT A/P with contrast: Area of hypoattenuation in the midpole of the left kidney may represent pyelonephritis or a lesion. Correlation with urinalysis advised. Follow-up contrast-enhanced CT with renal protocol or MRI with intravenous contrast is advised in 2 months to assess for a lesion.  Patient has a one week history of fever and nausea/vomiting, denies urinary symptoms. Denies dysuria, increased urinary frequency, hematuria. UA positive for bacteria, WBC, and leukocytes. Pt has no recorded history of prior UTI or urine cultures.    Plan:   Follow urine culture   D/c Monet catheter placed in the ED  Cont. Ceftriaxone    * Sepsis (HCC)  Assessment & Plan  64 y.o. female with a PMHx of T2DM with most recent A1C 6.8, essential HTN, B12 deficiency, OA of the knees, mixed HLD presented to the ED with history of nausea and vomiting since Monday. Patient's granddaughters at bedside helping provide history, they reported she had a Tmax of 103. On presentation to the ED Temp 103, tachycardia to 130s, WBC elevated to 11.29, Lactic 2.3. Pt had septic workup in the ED, UA was positive for bacteria, WBC, and leukocytes.   CT A/P with contrast: concern for pyelonephritis as well as punctate focus of air in the bladder.  Blood culture positive for gram + cocci in clusters. May be contaminant as 1/2  Pt is s/p one dose of zosyn in the ED, 2L of NS, catheter placed in the ED. Catheter removed on inpatient unit, s/p 1  dose of ceftriaxone.   Lactic acid 2.3 -> 1.0   CXR clear.     Plan:   Follow up blood cultures  Follow up urine culture  Cont. ceftriaxone 1 g daily  PRN tylenol 650 mg for fever  PRN zofran 4 mg Q6h for nausea  S/p 3L IVF resuscitation   Hold losartan-HCTZ d/t hypotension    B12 deficiency  Assessment & Plan  Patient has known history of B12 deficiency, takes 1,000 mcg daily. Continue home medication    Mixed hyperlipidemia  Assessment & Plan  Patient taking rosuvastatin 10 mg daily.     Plan:   Pravastatin 80 mg daily    Essential hypertension  Assessment & Plan  Home regimen losartan-hydrochlorothiazide 100-25 mg daily   Verapamil 240 mg daily    Plan:   - Holding all hypertensive medications while patient is septic  - continue to monitor BP as patient was hypotensive 104/45  - s/p 2 L of NS in ED, patient has become hypotensive started on 1 L bolus of isolyte    Diabetes mellitus (HCC)  Assessment & Plan  Lab Results   Component Value Date    HGBA1C 6.8 (H) 2024       Recent Labs     24  1222   POCGLU 282*       Blood Sugar Average: Last 72 hrs:  (P) 282  Trulicity 3 mg weekly  Glipizide 5 mg daily     Plan:   - Follow up HbA1c  - SSI, monitor glucose  - Hold home trulicity/glipizide        Disposition: Medically active       SUBJECTIVE     Patient seen and examined. Pt endorses some chills last night and nausea and headache this morning. Had a bowel movement last night. No other acute complaints.     OBJECTIVE     Vitals:    24 2248 24 0037 24 0625 24 0806   BP: 124/63   126/63   BP Location:       Pulse: 101 91 87 96   Resp: 18   12   Temp: (!) 102.5 °F (39.2 °C) (!) 101 °F (38.3 °C) 99.6 °F (37.6 °C) 99.6 °F (37.6 °C)   TempSrc:       SpO2: 96% 91% 95% 95%   Weight:       Height:          Temperature:   Temp (24hrs), Av.6 °F (38.1 °C), Min:99.6 °F (37.6 °C), Max:102.5 °F (39.2 °C)    Temperature: 99.6 °F (37.6 °C)  Intake & Output:  I/O           09/08 0701  09/09 0700 09/09 0701  09/10 0700    P.O.  120     IV Piggyback  1000     Total Intake(mL/kg)  1120 (13.9)     Urine (mL/kg/hr)  1100 500 (1.3)    Stool  0     Total Output  1100 500    Net  +20 -500           Unmeasured Stool Occurrence  1 x           Weights:   IBW (Ideal Body Weight): 40.9 kg    Body mass index is 37.2 kg/m².  Weight (last 2 days)       Date/Time Weight    09/08/24 1107 80.7 (178)          Physical Exam  Vitals and nursing note reviewed.   Constitutional:       General: She is not in acute distress.     Appearance: She is well-developed. She is obese.   HENT:      Head: Normocephalic and atraumatic.   Eyes:      Conjunctiva/sclera: Conjunctivae normal.   Cardiovascular:      Rate and Rhythm: Normal rate and regular rhythm.      Heart sounds: No murmur heard.  Pulmonary:      Effort: Pulmonary effort is normal. No respiratory distress.      Breath sounds: Normal breath sounds.   Abdominal:      Palpations: Abdomen is soft.      Tenderness: There is abdominal tenderness in the right upper quadrant and right lower quadrant. There is right CVA tenderness and left CVA tenderness.   Musculoskeletal:         General: No swelling.      Cervical back: Neck supple.   Skin:     General: Skin is warm and dry.      Capillary Refill: Capillary refill takes less than 2 seconds.   Neurological:      Mental Status: She is alert.   Psychiatric:         Mood and Affect: Mood normal.       LABORATORY DATA     Labs: I have personally reviewed pertinent reports.  Results from last 7 days   Lab Units 09/09/24  0621 09/09/24  0619 09/08/24  1212   WBC Thousand/uL  --  9.63 11.29*   HEMOGLOBIN g/dL  --  11.7 13.9   HEMATOCRIT %  --  34.9 41.4   PLATELETS Thousands/uL 150 145* 182   SEGS PCT %  --  77* 82*   MONO PCT %  --  10 5   EOS PCT %  --  0 0      Results from last 7 days   Lab Units 09/09/24  0619 09/08/24  1212   POTASSIUM mmol/L 3.3* 3.4*   CHLORIDE mmol/L 100 98   CO2 mmol/L 28 29   BUN mg/dL 9 11    CREATININE mg/dL 0.68 0.76   CALCIUM mg/dL 8.4 9.7   ALK PHOS U/L  --  79   ALT U/L  --  20   AST U/L  --  16              Results from last 7 days   Lab Units 09/08/24  1212   INR  1.04   PTT seconds 25     Results from last 7 days   Lab Units 09/08/24  1548   LACTIC ACID mmol/L 1.0           IMAGING & DIAGNOSTIC TESTING     Radiology Results: I have personally reviewed pertinent reports.  XR chest pa & lateral    Result Date: 9/9/2024  Impression: No acute cardiopulmonary disease. Workstation performed: [id number]     CT abdomen pelvis with contrast    Result Date: 9/8/2024  Impression: 1.  Area of hypoattenuation in the midpole of the left kidney may represent pyelonephritis or a lesion. Correlation with urinalysis advised. Follow-up contrast-enhanced CT with renal protocol or MRI with intravenous contrast is advised in 2 months to assess for a lesion. 2.  Punctate focus of air is considered abnormal in the absence of recent catheterization, and can represent infection. Correlation with urinalysis is advised. 3.  No acute gastroenterologic pathology. The study was marked in EPIC for immediate notification. Workstation performed: [id number]     Other Diagnostic Testing: I have personally reviewed pertinent reports.    ACTIVE MEDICATIONS     Current Facility-Administered Medications   Medication Dose Route Frequency    acetaminophen (TYLENOL) tablet 650 mg  650 mg Oral Q6H PRN    cefTRIAXone (ROCEPHIN) 1,000 mg in dextrose 5 % 50 mL IVPB  1,000 mg Intravenous Q24H    cyanocobalamin (VITAMIN B-12) tablet 1,000 mcg  1,000 mcg Oral Daily    enoxaparin (LOVENOX) subcutaneous injection 40 mg  40 mg Subcutaneous Daily    insulin glargine (LANTUS) subcutaneous injection 10 Units 0.1 mL  10 Units Subcutaneous HS    insulin lispro (HumALOG/ADMELOG) 100 units/mL subcutaneous injection 1-6 Units  1-6 Units Subcutaneous 4x Daily (AC & HS)    ondansetron (ZOFRAN) injection 4 mg  4 mg Intravenous Q6H PRN    potassium chloride  "(Klor-Con M20) CR tablet 40 mEq  40 mEq Oral BID    pravastatin (PRAVACHOL) tablet 80 mg  80 mg Oral Daily With Dinner       VTE Pharmacologic Prophylaxis: Sequential compression device (Venodyne)  and Enoxaparin (Lovenox)  VTE Mechanical Prophylaxis: sequential compression device    Portions of the record may have been created with voice recognition software.  Occasional wrong word or \"sound a like\" substitutions may have occurred due to the inherent limitations of voice recognition software.  Read the chart carefully and recognize, using context, where substitutions have occurred.  ==  Baylee Spicer DO  [facility]  Internal medicine resident PGY 2      "

## 2024-09-10 LAB
ANION GAP SERPL CALCULATED.3IONS-SCNC: 7 MMOL/L (ref 4–13)
BACTERIA UR CULT: ABNORMAL
BACTERIA UR CULT: ABNORMAL
BUN SERPL-MCNC: 11 MG/DL (ref 5–25)
CALCIUM SERPL-MCNC: 8.9 MG/DL (ref 8.4–10.2)
CHLORIDE SERPL-SCNC: 101 MMOL/L (ref 96–108)
CO2 SERPL-SCNC: 29 MMOL/L (ref 21–32)
CREAT SERPL-MCNC: 0.58 MG/DL (ref 0.6–1.3)
ERYTHROCYTE [DISTWIDTH] IN BLOOD BY AUTOMATED COUNT: 12.2 % (ref 11.6–15.1)
GFR SERPL CREATININE-BSD FRML MDRD: 97 ML/MIN/1.73SQ M
GLUCOSE SERPL-MCNC: 250 MG/DL (ref 65–140)
GLUCOSE SERPL-MCNC: 262 MG/DL (ref 65–140)
GLUCOSE SERPL-MCNC: 293 MG/DL (ref 65–140)
GLUCOSE SERPL-MCNC: 303 MG/DL (ref 65–140)
GLUCOSE SERPL-MCNC: 306 MG/DL (ref 65–140)
HCT VFR BLD AUTO: 36.3 % (ref 34.8–46.1)
HGB BLD-MCNC: 11.8 G/DL (ref 11.5–15.4)
MCH RBC QN AUTO: 29.4 PG (ref 26.8–34.3)
MCHC RBC AUTO-ENTMCNC: 32.5 G/DL (ref 31.4–37.4)
MCV RBC AUTO: 91 FL (ref 82–98)
PLATELET # BLD AUTO: 164 THOUSANDS/UL (ref 149–390)
PMV BLD AUTO: 9 FL (ref 8.9–12.7)
POTASSIUM SERPL-SCNC: 4 MMOL/L (ref 3.5–5.3)
RBC # BLD AUTO: 4.01 MILLION/UL (ref 3.81–5.12)
SODIUM SERPL-SCNC: 137 MMOL/L (ref 135–147)
WBC # BLD AUTO: 7.3 THOUSAND/UL (ref 4.31–10.16)

## 2024-09-10 PROCEDURE — 85027 COMPLETE CBC AUTOMATED: CPT

## 2024-09-10 PROCEDURE — 80048 BASIC METABOLIC PNL TOTAL CA: CPT

## 2024-09-10 PROCEDURE — 82948 REAGENT STRIP/BLOOD GLUCOSE: CPT

## 2024-09-10 RX ORDER — INSULIN GLARGINE 100 [IU]/ML
20 INJECTION, SOLUTION SUBCUTANEOUS
Status: DISCONTINUED | OUTPATIENT
Start: 2024-09-10 | End: 2024-09-11 | Stop reason: HOSPADM

## 2024-09-10 RX ADMIN — INSULIN LISPRO 4 UNITS: 100 INJECTION, SOLUTION INTRAVENOUS; SUBCUTANEOUS at 16:16

## 2024-09-10 RX ADMIN — INSULIN LISPRO 4 UNITS: 100 INJECTION, SOLUTION INTRAVENOUS; SUBCUTANEOUS at 22:22

## 2024-09-10 RX ADMIN — ACETAMINOPHEN 650 MG: 325 TABLET ORAL at 10:03

## 2024-09-10 RX ADMIN — CYANOCOBALAMIN TAB 500 MCG 1000 MCG: 500 TAB at 08:16

## 2024-09-10 RX ADMIN — INSULIN GLARGINE 20 UNITS: 100 INJECTION, SOLUTION SUBCUTANEOUS at 22:22

## 2024-09-10 RX ADMIN — INSULIN LISPRO 4 UNITS: 100 INJECTION, SOLUTION INTRAVENOUS; SUBCUTANEOUS at 11:23

## 2024-09-10 RX ADMIN — INSULIN LISPRO 3 UNITS: 100 INJECTION, SOLUTION INTRAVENOUS; SUBCUTANEOUS at 08:16

## 2024-09-10 RX ADMIN — ACETAMINOPHEN 650 MG: 325 TABLET ORAL at 19:24

## 2024-09-10 RX ADMIN — CEFTRIAXONE SODIUM 1000 MG: 10 INJECTION, POWDER, FOR SOLUTION INTRAVENOUS at 14:00

## 2024-09-10 RX ADMIN — ENOXAPARIN SODIUM 40 MG: 40 INJECTION SUBCUTANEOUS at 08:16

## 2024-09-10 RX ADMIN — PRAVASTATIN SODIUM 80 MG: 80 TABLET ORAL at 16:13

## 2024-09-10 NOTE — PLAN OF CARE
Problem: PAIN - ADULT  Goal: Verbalizes/displays adequate comfort level or baseline comfort level  Description: Interventions:  - Encourage patient to monitor pain and request assistance  - Assess pain using appropriate pain scale  - Administer analgesics based on type and severity of pain and evaluate response  - Implement non-pharmacological measures as appropriate and evaluate response  - Consider cultural and social influences on pain and pain management  - Notify physician/advanced practitioner if interventions unsuccessful or patient reports new pain  Outcome: Progressing     Problem: INFECTION - ADULT  Goal: Absence or prevention of progression during hospitalization  Description: INTERVENTIONS:  - Assess and monitor for signs and symptoms of infection  - Monitor lab/diagnostic results  - Monitor all insertion sites, i.e. indwelling lines, tubes, and drains  - Monitor endotracheal if appropriate and nasal secretions for changes in amount and color  - [place] appropriate cooling/warming therapies per order  - Administer medications as ordered  - Instruct and encourage patient and family to use good hand hygiene technique  - Identify and instruct in appropriate isolation precautions for identified infection/condition  Outcome: Progressing  Goal: Absence of fever/infection during neutropenic period  Description: INTERVENTIONS:  - Monitor WBC    Outcome: Progressing     Problem: SAFETY ADULT  Goal: Patient will remain free of falls  Description: INTERVENTIONS:  - Educate patient/family on patient safety including physical limitations  - Instruct patient to call for assistance with activity   - Consult OT/PT to assist with strengthening/mobility   - Keep Call bell within reach  - Keep bed low and locked with side rails adjusted as appropriate  - Keep care items and personal belongings within reach  - Initiate and maintain comfort rounds  - Make Fall Risk Sign visible to staff  - Apply yellow socks and bracelet  for high fall risk patients  - Consider moving patient to room near nurses station  Outcome: Progressing  Goal: Maintain or return to baseline ADL function  Description: INTERVENTIONS:  -  Assess patient's ability to carry out ADLs; assess patient's baseline for ADL function and identify physical deficits which impact ability to perform ADLs (bathing, care of mouth/teeth, toileting, grooming, dressing, etc.)  - Assess/evaluate cause of self-care deficits   - Assess range of motion  - Assess patient's mobility; develop plan if impaired  - Assess patient's need for assistive devices and provide as appropriate  - Encourage maximum independence but intervene and supervise when necessary  - Involve family in performance of ADLs  - Assess for home care needs following discharge   - Consider OT consult to assist with ADL evaluation and planning for discharge  - Provide patient education as appropriate  Outcome: Progressing  Goal: Maintains/Returns to pre admission functional level  Description: INTERVENTIONS:  - Perform AM-PAC 6 Click Basic Mobility/ Daily Activity assessment daily.  - Set and communicate daily mobility goal to care team and patient/family/caregiver.   - Collaborate with rehabilitation services on mobility goals if consulted  - Out of bed for toileting  - Record patient progress and toleration of activity level   Outcome: Progressing     Problem: NEUROSENSORY - ADULT  Goal: Achieves stable or improved neurological status  Description: INTERVENTIONS  - Monitor and report changes in neurological status  - Monitor vital signs such as temperature, blood pressure, glucose, and any other labs ordered   - Initiate measures to prevent increased intracranial pressure  - Monitor for seizure activity and implement precautions if appropriate      Outcome: Progressing  Goal: Remains free of injury related to seizures activity  Description: INTERVENTIONS  - Maintain airway, patient safety  and administer oxygen as  ordered  - Monitor patient for seizure activity, document and report duration and description of seizure to physician/advanced practitioner  - If seizure occurs,  ensure patient safety during seizure  - Reorient patient post seizure  - Seizure pads on all 4 side rails  - Instruct patient/family to notify RN of any seizure activity including if an aura is experienced  - Instruct patient/family to call for assistance with activity based on nursing assessment  - Administer anti-seizure medications if ordered    Outcome: Progressing  Goal: Achieves maximal functionality and self care  Description: INTERVENTIONS  - Monitor swallowing and airway patency with patient fatigue and changes in neurological status  - Encourage and assist patient to increase activity and self care.   - Encourage visually impaired, hearing impaired and aphasic patients to use assistive/communication devices  Outcome: Progressing     Problem: GENITOURINARY - ADULT  Goal: Maintains or returns to baseline urinary function  Description: INTERVENTIONS:  - Assess urinary function  - Encourage oral fluids to ensure adequate hydration if ordered  - Administer IV fluids as ordered to ensure adequate hydration  - Administer ordered medications as needed  - Offer frequent toileting  - Follow urinary retention protocol if ordered  Outcome: Progressing  Goal: Absence of urinary retention  Description: INTERVENTIONS:  - Assess patient’s ability to void and empty bladder  - Monitor I/O  - Bladder scan as needed  - Discuss with physician/AP medications to alleviate retention as needed  - Discuss catheterization for long term situations as appropriate  Outcome: Progressing  Goal: Urinary catheter remains patent  Description: INTERVENTIONS:  - Assess patency of urinary catheter  - If patient has a chronic santiago, consider changing catheter if non-functioning  - Follow guidelines for intermittent irrigation of non-functioning urinary catheter  Outcome: Progressing      Problem: MUSCULOSKELETAL - ADULT  Goal: Maintain or return mobility to safest level of function  Description: INTERVENTIONS:  - Assess patient's ability to carry out ADLs; assess patient's baseline for ADL function and identify physical deficits which impact ability to perform ADLs (bathing, care of mouth/teeth, toileting, grooming, dressing, etc.)  - Assess/evaluate cause of self-care deficits   - Assess range of motion  - Assess patient's mobility  - Assess patient's need for assistive devices and provide as appropriate  - Encourage maximum independence but intervene and supervise when necessary  - Involve family in performance of ADLs  - Assess for home care needs following discharge   - Consider OT consult to assist with ADL evaluation and planning for discharge  - Provide patient education as appropriate  Outcome: Progressing  Goal: Maintain proper alignment of affected body part  Description: INTERVENTIONS:  - Support, maintain and protect limb and body alignment  - Provide patient/ family with appropriate education  Outcome: Progressing

## 2024-09-10 NOTE — PLAN OF CARE
Problem: PAIN - ADULT  Goal: Verbalizes/displays adequate comfort level or baseline comfort level  Description: Interventions:  - Encourage patient to monitor pain and request assistance  - Assess pain using appropriate pain scale  - Administer analgesics based on type and severity of pain and evaluate response  - Implement non-pharmacological measures as appropriate and evaluate response  - Consider cultural and social influences on pain and pain management  - Notify physician/advanced practitioner if interventions unsuccessful or patient reports new pain  9/10/2024 1019 by Alison Zavaleta RN  Outcome: Progressing  9/10/2024 1018 by Alison Zavaleta RN  Outcome: Progressing     Problem: INFECTION - ADULT  Goal: Absence or prevention of progression during hospitalization  Description: INTERVENTIONS:  - Assess and monitor for signs and symptoms of infection  - Monitor lab/diagnostic results  - Monitor all insertion sites, i.e. indwelling lines, tubes, and drains  - Monitor endotracheal if appropriate and nasal secretions for changes in amount and color  - Ranger appropriate cooling/warming therapies per order  - Administer medications as ordered  - Instruct and encourage patient and family to use good hand hygiene technique  - Identify and instruct in appropriate isolation precautions for identified infection/condition  9/10/2024 1019 by Alison Zavaleta RN  Outcome: Progressing  9/10/2024 1018 by Alison Zavaleta RN  Outcome: Progressing  Goal: Absence of fever/infection during neutropenic period  Description: INTERVENTIONS:  - Monitor WBC    9/10/2024 1019 by Alison Zavaleta RN  Outcome: Progressing  9/10/2024 1018 by Alison Zavaleta RN  Outcome: Progressing     Problem: SAFETY ADULT  Goal: Patient will remain free of falls  Description: INTERVENTIONS:  - Educate patient/family on patient safety including physical limitations  - Instruct patient to call for assistance with activity   - Consult OT/PT to  assist with strengthening/mobility   - Keep Call bell within reach  - Keep bed low and locked with side rails adjusted as appropriate  - Keep care items and personal belongings within reach  - Initiate and maintain comfort rounds  - Make Fall Risk Sign visible to staff  - Apply yellow socks and bracelet for high fall risk patients  - Consider moving patient to room near nurses station  9/10/2024 1019 by Alison Zavaleta RN  Outcome: Progressing  9/10/2024 1018 by Alison Zavaleta RN  Outcome: Progressing  Goal: Maintain or return to baseline ADL function  Description: INTERVENTIONS:  -  Assess patient's ability to carry out ADLs; assess patient's baseline for ADL function and identify physical deficits which impact ability to perform ADLs (bathing, care of mouth/teeth, toileting, grooming, dressing, etc.)  - Assess/evaluate cause of self-care deficits   - Assess range of motion  - Assess patient's mobility; develop plan if impaired  - Assess patient's need for assistive devices and provide as appropriate  - Encourage maximum independence but intervene and supervise when necessary  - Involve family in performance of ADLs  - Assess for home care needs following discharge   - Consider OT consult to assist with ADL evaluation and planning for discharge  - Provide patient education as appropriate  9/10/2024 1019 by Alison Zavaleta RN  Outcome: Progressing  9/10/2024 1018 by Alison Zavaleta RN  Outcome: Progressing  Goal: Maintains/Returns to pre admission functional level  Description: INTERVENTIONS:  - Perform AM-PAC 6 Click Basic Mobility/ Daily Activity assessment daily.  - Set and communicate daily mobility goal to care team and patient/family/caregiver.   - Collaborate with rehabilitation services on mobility goals if consulted  - Out of bed for toileting  - Record patient progress and toleration of activity level   9/10/2024 1019 by Alison Zavaleta RN  Outcome: Progressing  9/10/2024 1018 by Alison Zavaleta  RN  Outcome: Progressing     Problem: DISCHARGE PLANNING  Goal: Discharge to home or other facility with appropriate resources  Description: INTERVENTIONS:  - Identify barriers to discharge w/patient and caregiver  - Arrange for needed discharge resources and transportation as appropriate  - Identify discharge learning needs (meds, wound care, etc.)  - Arrange for interpretive services to assist at discharge as needed  - Refer to Case Management Department for coordinating discharge planning if the patient needs post-hospital services based on physician/advanced practitioner order or complex needs related to functional status, cognitive ability, or social support system  9/10/2024 1019 by Alison Zavaleta RN  Outcome: Progressing  9/10/2024 1018 by Alison Zavaleta RN  Outcome: Progressing     Problem: Knowledge Deficit  Goal: Patient/family/caregiver demonstrates understanding of disease process, treatment plan, medications, and discharge instructions  Description: Complete learning assessment and assess knowledge base.  Interventions:  - Provide teaching at level of understanding  - Provide teaching via preferred learning methods  9/10/2024 1019 by Alison Zavaleta RN  Outcome: Progressing  9/10/2024 1018 by Alison Zavaleta RN  Outcome: Progressing     Problem: Nutrition/Hydration-ADULT  Goal: Nutrient/Hydration intake appropriate for improving, restoring or maintaining nutritional needs  Description: Monitor and assess patient's nutrition/hydration status for malnutrition. Collaborate with interdisciplinary team and initiate plan and interventions as ordered.  Monitor patient's weight and dietary intake as ordered or per policy. Utilize nutrition screening tool and intervene as necessary. Determine patient's food preferences and provide high-protein, high-caloric foods as appropriate.     INTERVENTIONS:  - Monitor oral intake, urinary output, labs, and treatment plans  - Assess nutrition and hydration status and  recommend course of action  - Evaluate amount of meals eaten  - Assist patient with eating if necessary   - Allow adequate time for meals  - Recommend/ encourage appropriate diets, oral nutritional supplements, and vitamin/mineral supplements  - Order, calculate, and assess calorie counts as needed  - Recommend, monitor, and adjust tube feedings and TPN/PPN based on assessed needs  - Assess need for intravenous fluids  - Provide specific nutrition/hydration education as appropriate  - Include patient/family/caregiver in decisions related to nutrition  9/10/2024 1019 by Alison Zavaleta RN  Outcome: Progressing  9/10/2024 1018 by Alison Zavaleta RN  Outcome: Progressing     Problem: NEUROSENSORY - ADULT  Goal: Achieves stable or improved neurological status  Description: INTERVENTIONS  - Monitor and report changes in neurological status  - Monitor vital signs such as temperature, blood pressure, glucose, and any other labs ordered   - Initiate measures to prevent increased intracranial pressure  - Monitor for seizure activity and implement precautions if appropriate      9/10/2024 1019 by Alison Zavaleta RN  Outcome: Progressing  9/10/2024 1018 by Alison Zavaleta RN  Outcome: Progressing  Goal: Remains free of injury related to seizures activity  Description: INTERVENTIONS  - Maintain airway, patient safety  and administer oxygen as ordered  - Monitor patient for seizure activity, document and report duration and description of seizure to physician/advanced practitioner  - If seizure occurs,  ensure patient safety during seizure  - Reorient patient post seizure  - Seizure pads on all 4 side rails  - Instruct patient/family to notify RN of any seizure activity including if an aura is experienced  - Instruct patient/family to call for assistance with activity based on nursing assessment  - Administer anti-seizure medications if ordered    9/10/2024 1019 by Alison Zavaleta RN  Outcome: Progressing  9/10/2024 1018 by  Alison Zavaleta RN  Outcome: Progressing  Goal: Achieves maximal functionality and self care  Description: INTERVENTIONS  - Monitor swallowing and airway patency with patient fatigue and changes in neurological status  - Encourage and assist patient to increase activity and self care.   - Encourage visually impaired, hearing impaired and aphasic patients to use assistive/communication devices  9/10/2024 1019 by Alison Zavaleta RN  Outcome: Progressing  9/10/2024 1018 by Alison Zavaleta RN  Outcome: Progressing     Problem: CARDIOVASCULAR - ADULT  Goal: Maintains optimal cardiac output and hemodynamic stability  Description: INTERVENTIONS:  - Monitor I/O, vital signs and rhythm  - Monitor for S/S and trends of decreased cardiac output  - Administer and titrate ordered vasoactive medications to optimize hemodynamic stability  - Assess quality of pulses, skin color and temperature  - Assess for signs of decreased coronary artery perfusion  - Instruct patient to report change in severity of symptoms  9/10/2024 1019 by Alison Zavaleta RN  Outcome: Progressing  9/10/2024 1018 by Alison Zavaleta RN  Outcome: Progressing  Goal: Absence of cardiac dysrhythmias or at baseline rhythm  Description: INTERVENTIONS:  - Continuous cardiac monitoring, vital signs, obtain 12 lead EKG if ordered  - Administer antiarrhythmic and heart rate control medications as ordered  - Monitor electrolytes and administer replacement therapy as ordered  9/10/2024 1019 by Alison Zavaleta RN  Outcome: Progressing  9/10/2024 1018 by Alison Zavaleta RN  Outcome: Progressing     Problem: RESPIRATORY - ADULT  Goal: Achieves optimal ventilation and oxygenation  Description: INTERVENTIONS:  - Assess for changes in respiratory status  - Assess for changes in mentation and behavior  - Position to facilitate oxygenation and minimize respiratory effort  - Oxygen administered by appropriate delivery if ordered  - Initiate smoking cessation education as  indicated  - Encourage broncho-pulmonary hygiene including cough, deep breathe, Incentive Spirometry  - Assess the need for suctioning and aspirate as needed  - Assess and instruct to report SOB or any respiratory difficulty  - Respiratory Therapy support as indicated  9/10/2024 1019 by Alison Zavaleta RN  Outcome: Progressing  9/10/2024 1018 by Alison Zavaleta RN  Outcome: Progressing     Problem: GASTROINTESTINAL - ADULT  Goal: Minimal or absence of nausea and/or vomiting  Description: INTERVENTIONS:  - Administer IV fluids if ordered to ensure adequate hydration  - Maintain NPO status until nausea and vomiting are resolved  - Nasogastric tube if ordered  - Administer ordered antiemetic medications as needed  - Provide nonpharmacologic comfort measures as appropriate  - Advance diet as tolerated, if ordered  - Consider nutrition services referral to assist patient with adequate nutrition and appropriate food choices  9/10/2024 1019 by Alison Zavaleta RN  Outcome: Progressing  9/10/2024 1018 by Alison Zavaleta RN  Outcome: Progressing  Goal: Maintains or returns to baseline bowel function  Description: INTERVENTIONS:  - Assess bowel function  - Encourage oral fluids to ensure adequate hydration  - Administer IV fluids if ordered to ensure adequate hydration  - Administer ordered medications as needed  - Encourage mobilization and activity  - Consider nutritional services referral to assist patient with adequate nutrition and appropriate food choices  9/10/2024 1019 by Alison Zavaleta RN  Outcome: Progressing  9/10/2024 1018 by Alison Zavaleta RN  Outcome: Progressing  Goal: Maintains adequate nutritional intake  Description: INTERVENTIONS:  - Monitor percentage of each meal consumed  - Identify factors contributing to decreased intake, treat as appropriate  - Assist with meals as needed  - Monitor I&O, weight, and lab values if indicated  - Obtain nutrition services referral as needed  9/10/2024 1019 by  Alison Zavaleta RN  Outcome: Progressing  9/10/2024 1018 by Alison Zavaleta RN  Outcome: Progressing  Goal: Establish and maintain optimal ostomy function  Description: INTERVENTIONS:  - Assess bowel function  - Encourage oral fluids to ensure adequate hydration  - Administer IV fluids if ordered to ensure adequate hydration   - Administer ordered medications as needed  - Encourage mobilization and activity  - Nutrition services referral to assist patient with appropriate food choices  - Assess stoma site  - Consider wound care consult   9/10/2024 1019 by Alison Zavaleta RN  Outcome: Progressing  9/10/2024 1018 by Alison Zavaleta RN  Outcome: Progressing  Goal: Oral mucous membranes remain intact  Description: INTERVENTIONS  - Assess oral mucosa and hygiene practices  - Implement preventative oral hygiene regimen  - Implement oral medicated treatments as ordered  - Initiate Nutrition services referral as needed  9/10/2024 1019 by Alison Zavaleta RN  Outcome: Progressing  9/10/2024 1018 by Alison Zavaleta RN  Outcome: Progressing     Problem: GENITOURINARY - ADULT  Goal: Maintains or returns to baseline urinary function  Description: INTERVENTIONS:  - Assess urinary function  - Encourage oral fluids to ensure adequate hydration if ordered  - Administer IV fluids as ordered to ensure adequate hydration  - Administer ordered medications as needed  - Offer frequent toileting  - Follow urinary retention protocol if ordered  9/10/2024 1019 by Alison Zavaleta RN  Outcome: Progressing  9/10/2024 1018 by Alison Zavaleta RN  Outcome: Progressing  Goal: Absence of urinary retention  Description: INTERVENTIONS:  - Assess patient’s ability to void and empty bladder  - Monitor I/O  - Bladder scan as needed  - Discuss with physician/AP medications to alleviate retention as needed  - Discuss catheterization for long term situations as appropriate  9/10/2024 1019 by Alison Zavaleta RN  Outcome: Progressing  9/10/2024 1018  by Alison Zavaleta RN  Outcome: Progressing  Goal: Urinary catheter remains patent  Description: INTERVENTIONS:  - Assess patency of urinary catheter  - If patient has a chronic santiago, consider changing catheter if non-functioning  - Follow guidelines for intermittent irrigation of non-functioning urinary catheter  9/10/2024 1019 by Alison Zavaleta RN  Outcome: Progressing  9/10/2024 1018 by Alison Zavaleta RN  Outcome: Progressing     Problem: METABOLIC, FLUID AND ELECTROLYTES - ADULT  Goal: Electrolytes maintained within normal limits  Description: INTERVENTIONS:  - Monitor labs and assess patient for signs and symptoms of electrolyte imbalances  - Administer electrolyte replacement as ordered  - Monitor response to electrolyte replacements, including repeat lab results as appropriate  - Instruct patient on fluid and nutrition as appropriate  9/10/2024 1019 by Alison Zavaleta RN  Outcome: Progressing  9/10/2024 1018 by Alison Zavaleta RN  Outcome: Progressing  Goal: Fluid balance maintained  Description: INTERVENTIONS:  - Monitor labs   - Monitor I/O and WT  - Instruct patient on fluid and nutrition as appropriate  - Assess for signs & symptoms of volume excess or deficit  9/10/2024 1019 by Alison Zavaleta RN  Outcome: Progressing  9/10/2024 1018 by Alison Zavaleta RN  Outcome: Progressing  Goal: Glucose maintained within target range  Description: INTERVENTIONS:  - Monitor Blood Glucose as ordered  - Assess for signs and symptoms of hyperglycemia and hypoglycemia  - Administer ordered medications to maintain glucose within target range  - Assess nutritional intake and initiate nutrition service referral as needed  9/10/2024 1019 by Alison Zavaleta RN  Outcome: Progressing  9/10/2024 1018 by Alison Zavaleta RN  Outcome: Progressing     Problem: SKIN/TISSUE INTEGRITY - ADULT  Goal: Skin Integrity remains intact(Skin Breakdown Prevention)  Description: Assess:  -Inspect skin when repositioning, toileting,  and assisting with ADLS  -Assess extremities for adequate circulation and sensation     Bed Management:  -Have minimal linens on bed & keep smooth, unwrinkled  -Change linens as needed when moist or perspiring    Toileting:  -Offer bedside commode    Activity:  -Encourage activity and walks on unit  -Encourage or provide ROM exercises   -Use appropriate equipment to lift or move patient in bed    Skin Care:  -Avoid use of baby powder, tape, friction and shearing, hot water or constrictive clothing  -Do not massage red bony areas    Next Steps:  9/10/2024 1019 by Alison Zavaleta RN  Outcome: Progressing  9/10/2024 1018 by Alison Zavaleta RN  Outcome: Progressing  Goal: Incision(s), wounds(s) or drain site(s) healing without S/S of infection  Description: INTERVENTIONS  - Assess and document dressing, incision, wound bed, drain sites and surrounding tissue  - Provide patient and family education  Outcome: Progressing  9/10/2024 1018 by Alison Zavaleta RN  Outcome: Progressing  Goal: Pressure injury heals and does not worsen  Description: Interventions:  - Implement low air loss mattress or specialty surface (Criteria met)  - Apply silicone foam dressing  - Consider nutrition services referral as needed  9/10/2024 1019 by Alison Zavaleta RN  Outcome: Progressing  9/10/2024 1018 by Alison Zavaleta RN  Outcome: Progressing     Problem: HEMATOLOGIC - ADULT  Goal: Maintains hematologic stability  Description: INTERVENTIONS  - Assess for signs and symptoms of bleeding or hemorrhage  - Monitor labs  - Administer supportive blood products/factors as ordered and appropriate  9/10/2024 1019 by Alison Zavaleta RN  Outcome: Progressing  9/10/2024 1018 by Alison Zavaleta RN  Outcome: Progressing     Problem: MUSCULOSKELETAL - ADULT  Goal: Maintain or return mobility to safest level of function  Description: INTERVENTIONS:  - Assess patient's ability to carry out ADLs; assess patient's baseline for ADL function and identify  physical deficits which impact ability to perform ADLs (bathing, care of mouth/teeth, toileting, grooming, dressing, etc.)  - Assess/evaluate cause of self-care deficits   - Assess range of motion  - Assess patient's mobility  - Assess patient's need for assistive devices and provide as appropriate  - Encourage maximum independence but intervene and supervise when necessary  - Involve family in performance of ADLs  - Assess for home care needs following discharge   - Consider OT consult to assist with ADL evaluation and planning for discharge  - Provide patient education as appropriate  9/10/2024 1019 by Alison Zavaleta RN  Outcome: Progressing  9/10/2024 1018 by Alison Zavaleta RN  Outcome: Progressing  Goal: Maintain proper alignment of affected body part  Description: INTERVENTIONS:  - Support, maintain and protect limb and body alignment  - Provide patient/ family with appropriate education  9/10/2024 1019 by Alison Zavaleta RN  Outcome: Progressing  9/10/2024 1018 by Alison Zavaleta RN  Outcome: Progressing

## 2024-09-10 NOTE — UTILIZATION REVIEW
"NOTIFICATION OF INPATIENT ADMISSION   AUTHORIZATION REQUEST   SERVICING FACILITY:   [facility]  Address: [address] [place] [address]  Tax ID: [id number]  NPI: [id number] ATTENDING PROVIDER:  Attending Name and NPI#: Shannon Lawrence Do [[id number]]  Address: [address] [place] [address]  Phone: [phone]   ADMISSION INFORMATION:  Place of Service: Inpatient [facility]  Place of Service Code: 21  Inpatient Admission Date/Time: 9/8/24  2:27 PM  Discharge Date/Time: No discharge date for patient encounter.  Admitting Diagnosis Code/Description:  UTI (urinary tract infection) [N39.0]  Sepsis (HCC) [A41.9]     UTILIZATION REVIEW CONTACT:  Sarah \"Claudia\"Zeke Utilization   Network Utilization Review Department  Phone: [phone]  Fax: [phone]  Email: [email or web address]  Contact for approvals/pending authorizations, clinical reviews, and discharge.     PHYSICIAN ADVISORY SERVICES:  Medical Necessity Denial & [facility] Review  Phone: [phone]  Fax: [phone]  Email: [email or web address]     DISCHARGE SUPPORT TEAM:  For Patients Discharge Needs & Updates  Phone: [phone] opt. 2 Fax: [phone]  Email: [email or web address]      "

## 2024-09-11 VITALS
OXYGEN SATURATION: 96 % | TEMPERATURE: 98.1 F | SYSTOLIC BLOOD PRESSURE: 139 MMHG | RESPIRATION RATE: 16 BRPM | DIASTOLIC BLOOD PRESSURE: 74 MMHG | HEIGHT: 58 IN | WEIGHT: 178 LBS | HEART RATE: 87 BPM | BODY MASS INDEX: 37.36 KG/M2

## 2024-09-11 PROBLEM — A41.9 SEPSIS (HCC): Status: RESOLVED | Noted: 2024-09-08 | Resolved: 2024-09-11

## 2024-09-11 LAB
ANION GAP SERPL CALCULATED.3IONS-SCNC: 7 MMOL/L (ref 4–13)
BACTERIA BLD CULT: ABNORMAL
BACTERIA BLD CULT: ABNORMAL
BUN SERPL-MCNC: 14 MG/DL (ref 5–25)
CALCIUM SERPL-MCNC: 9.5 MG/DL (ref 8.4–10.2)
CHLORIDE SERPL-SCNC: 100 MMOL/L (ref 96–108)
CO2 SERPL-SCNC: 31 MMOL/L (ref 21–32)
CREAT SERPL-MCNC: 0.63 MG/DL (ref 0.6–1.3)
ERYTHROCYTE [DISTWIDTH] IN BLOOD BY AUTOMATED COUNT: 12 % (ref 11.6–15.1)
GFR SERPL CREATININE-BSD FRML MDRD: 94 ML/MIN/1.73SQ M
GLUCOSE SERPL-MCNC: 249 MG/DL (ref 65–140)
GLUCOSE SERPL-MCNC: 251 MG/DL (ref 65–140)
GLUCOSE SERPL-MCNC: 310 MG/DL (ref 65–140)
GRAM STN SPEC: ABNORMAL
HCT VFR BLD AUTO: 38 % (ref 34.8–46.1)
HGB BLD-MCNC: 12.6 G/DL (ref 11.5–15.4)
MCH RBC QN AUTO: 29.4 PG (ref 26.8–34.3)
MCHC RBC AUTO-ENTMCNC: 33.2 G/DL (ref 31.4–37.4)
MCV RBC AUTO: 89 FL (ref 82–98)
PLATELET # BLD AUTO: 193 THOUSANDS/UL (ref 149–390)
PMV BLD AUTO: 8.8 FL (ref 8.9–12.7)
POTASSIUM SERPL-SCNC: 4.4 MMOL/L (ref 3.5–5.3)
RBC # BLD AUTO: 4.29 MILLION/UL (ref 3.81–5.12)
S AUREUS+CONS DNA BLD POS NAA+NON-PROBE: DETECTED
SODIUM SERPL-SCNC: 138 MMOL/L (ref 135–147)
WBC # BLD AUTO: 6.09 THOUSAND/UL (ref 4.31–10.16)

## 2024-09-11 PROCEDURE — 82948 REAGENT STRIP/BLOOD GLUCOSE: CPT

## 2024-09-11 PROCEDURE — 85027 COMPLETE CBC AUTOMATED: CPT

## 2024-09-11 PROCEDURE — 80048 BASIC METABOLIC PNL TOTAL CA: CPT

## 2024-09-11 RX ORDER — LANCETS 33 GAUGE
EACH MISCELLANEOUS
Qty: 100 EACH | Refills: 0 | Status: SHIPPED | OUTPATIENT
Start: 2024-09-11

## 2024-09-11 RX ORDER — BLOOD SUGAR DIAGNOSTIC
STRIP MISCELLANEOUS
Qty: 100 EACH | Refills: 0 | Status: SHIPPED | OUTPATIENT
Start: 2024-09-11

## 2024-09-11 RX ORDER — GLUCOSAMINE HCL/CHONDROITIN SU 500-400 MG
CAPSULE ORAL
Qty: 100 EACH | Refills: 0 | Status: SHIPPED | OUTPATIENT
Start: 2024-09-11

## 2024-09-11 RX ORDER — PEN NEEDLE, DIABETIC 32GX 5/32"
NEEDLE, DISPOSABLE MISCELLANEOUS
Qty: 100 EACH | Refills: 0 | Status: SHIPPED | OUTPATIENT
Start: 2024-09-11

## 2024-09-11 RX ORDER — BLOOD-GLUCOSE METER
KIT MISCELLANEOUS
Qty: 1 KIT | Refills: 0 | Status: SHIPPED | OUTPATIENT
Start: 2024-09-11

## 2024-09-11 RX ORDER — CEFPODOXIME PROXETIL 200 MG/1
200 TABLET, FILM COATED ORAL 2 TIMES DAILY
Qty: 12 TABLET | Refills: 0 | Status: SHIPPED | OUTPATIENT
Start: 2024-09-12 | End: 2024-09-18

## 2024-09-11 RX ORDER — INSULIN GLARGINE 100 [IU]/ML
25 INJECTION, SOLUTION SUBCUTANEOUS
Qty: 15 ML | Refills: 0 | Status: SHIPPED | OUTPATIENT
Start: 2024-09-11

## 2024-09-11 RX ADMIN — ENOXAPARIN SODIUM 40 MG: 40 INJECTION SUBCUTANEOUS at 08:22

## 2024-09-11 RX ADMIN — CEFTRIAXONE SODIUM 1000 MG: 10 INJECTION, POWDER, FOR SOLUTION INTRAVENOUS at 13:14

## 2024-09-11 RX ADMIN — CYANOCOBALAMIN TAB 500 MCG 1000 MCG: 500 TAB at 08:22

## 2024-09-11 RX ADMIN — INSULIN LISPRO 3 UNITS: 100 INJECTION, SOLUTION INTRAVENOUS; SUBCUTANEOUS at 08:22

## 2024-09-11 RX ADMIN — INSULIN LISPRO 5 UNITS: 100 INJECTION, SOLUTION INTRAVENOUS; SUBCUTANEOUS at 12:29

## 2024-09-11 NOTE — DISCHARGE SUMMARY
INTERNAL MEDICINE RESIDENCY DISCHARGE SUMMARY     Michelle Rosa   64 y.o. female  MRN: [id number]  Room/Bed: [facility]28/[facility]-01     [facility]   Encounter: [id number]    Active Problems:    Pyelonephritis    Diabetes mellitus (HCC)    Essential hypertension    Mixed hyperlipidemia    B12 deficiency      Pyelonephritis  Assessment & Plan  CT A/P with contrast: Area of hypoattenuation in the midpole of the left kidney may represent pyelonephritis or a lesion. Correlation with urinalysis advised. Follow-up contrast-enhanced CT with renal protocol or MRI with intravenous contrast is advised in 2 months to assess for a lesion.  Patient has a one week history of fever and nausea/vomiting, denies urinary symptoms. Denies dysuria, increased urinary frequency, hematuria. UA positive for bacteria, WBC, and leukocytes. Pt has no recorded history of prior UTI or urine cultures.    Plan:   Ucx growing E. Coli. Transition to cefpodoxime on discharge for 6 more days at home to complete 10 day course    Diabetes mellitus (HCC)  Assessment & Plan  Lab Results   Component Value Date    HGBA1C 9.7 (H) 09/08/2024       Recent Labs     09/10/24  1613 09/10/24  2107 09/11/24  0737 09/11/24  1114   POCGLU 293* 303* 251* 310*       Blood Sugar Average: Last 72 hrs:  (P) [id number]  Trulicity 3 mg weekly  Glipizide 5 mg daily     Diabetes now poorly controlled with A1c 9.7 on admission and 4 months ago being 6.8.  This could be a contributor to her pyelonephritis.      Plan:   - Increase to 25 units of Lantus at home, continue Trulicity  - F/u outpatient for blood glucose control, will likely need to be uptitrated OP by PCP  - D/c home glipizide    B12 deficiency  Assessment & Plan  Patient has known history of B12 deficiency, takes 1,000 mcg daily. Continue home medication    Mixed hyperlipidemia  Assessment & Plan  Patient taking rosuvastatin 10 mg daily.     Plan:    Pravastatin 80 mg daily    Essential hypertension  Assessment & Plan  Home regimen losartan-hydrochlorothiazide 100-25 mg daily   Verapamil 240 mg daily    Plan:   - Cont. Home regimen in the setting of resolved sepsis        DETAILS OF HOSPITAL COURSE     64-year-old female with past medical history of controlled type 2 diabetes, hypertension, B12 deficiency, hyperlipidemia, osteoarthritis presented to the ED due to fevers chills nausea and vomiting that had been on and off since 9/2.  She stated 9/2 she developed a temp of 99 and had malaise.  She stated 9/4 started feeling worse with fever, rigors as well as nausea and bilious emesis.  She states that 9/7 she was having rigors and fevers and was urged by her granddaughters to come in.  She denied any vomiting dysuria/CVA tenderness/urinary frequency/urinary urgency.     On initial presentation she was found to be febrile to 103 tachycardia to 133 blood pressure 120/85 is satting well on room air. She was found to meet septic criteria 2/2 UTI and CT revealed pyelonephritis and a punctate focus of air seen in the bladder.  She received 2 L of normal saline, 1 dose of Zosyn, as well a Monet catheter due to the punctate focus of air in the bladder and no history of previous catheterization.     On inpatient unit, Monet was removed the next day and pt was able to void independently. Urine culture 50-59,000 E coli, sensitivities reviewed. 1/2 blood culture with staph, other blood culture no growth x 48 hours, suspect contaminant. Patient received 4 days of Ceftriaxone IV with resultant resolution of urinary symptoms and fever. Transitioned to po abx outpatient for remaining 6/10 day treatment. Losartan-HCTZ was held during hospital stay d/t hypotension 2/2 sepsis. Pt is to continue losartan-HCTZ at home.     Diabetes was also found to be poorly controlled, with Ha1c of 9.7 on admission and glucose levels ranging from 230-330 during admission, and thought to most  likely have contributed to her pyelonephritis. During the hospital stay, pt was on strict carb-controlled diet and was started on 20 units of Lantus at bedtime, but was still found to have blood sugars above goal levels. For outpatient, pt is recommended to take 25 units of Lantus at bedtime, continue metformin and Trulicity, discontinue home glipizide, and f/u w/ PCP for tighter glucose control. Insulin education was provided and patient agreed with the addition of Lantus at bedtime.       Patient was seen and examined bedside.  No acute events overnight.  Denies chest pain, fever, chills, nausea, vomiting, constipation, dysuria.  Patient feels ready to go home.    Vitals:    09/11/24 0735   BP: 139/74   Pulse: 87   Resp: 16   Temp: 98.1 °F (36.7 °C)   SpO2: 96%      Physical Exam  Vitals and nursing note reviewed.   Constitutional:       General: She is not in acute distress.     Appearance: She is well-developed. She is not ill-appearing.   HENT:      Head: Normocephalic and atraumatic.   Eyes:      Conjunctiva/sclera: Conjunctivae normal.   Cardiovascular:      Rate and Rhythm: Normal rate and regular rhythm.      Heart sounds: No murmur heard.  Pulmonary:      Effort: Pulmonary effort is normal. No respiratory distress.      Breath sounds: Normal breath sounds.   Abdominal:      Palpations: Abdomen is soft.      Tenderness: There is no abdominal tenderness.   Musculoskeletal:         General: No swelling.      Cervical back: Neck supple.   Skin:     General: Skin is warm and dry.      Capillary Refill: Capillary refill takes less than 2 seconds.   Neurological:      General: No focal deficit present.      Mental Status: She is alert and oriented to person, place, and time.   Psychiatric:         Mood and Affect: Mood normal.            DISCHARGE INFORMATION     PCP at Discharge: Fausto Jimenez MD    Admitting Provider: Fausto Jimenez MD  Admission Date: 9/8/2024    Discharge Provider: Shannon Lawrence  DO  Discharge Date: 9/11/2024    Discharge Disposition: Home/Self Care  Discharge Condition: good  Discharge with Lines: no    Discharge Diet: diabetic diet  Activity Restrictions: none  Test Results Pending at Discharge: N/A    Discharge Diagnoses:  Principal Problem (Resolved):    Sepsis (HCC)  Active Problems:    Pyelonephritis    Diabetes mellitus (HCC)    Essential hypertension    Mixed hyperlipidemia    B12 deficiency      Consulting Providers:      Diagnostic & Therapeutic Procedures Performed:  XR chest pa & lateral    Result Date: 9/9/2024  Impression: No acute cardiopulmonary disease. Workstation performed: [id number]     CT abdomen pelvis with contrast    Result Date: 9/8/2024  Impression: 1.  Area of hypoattenuation in the midpole of the left kidney may represent pyelonephritis or a lesion. Correlation with urinalysis advised. Follow-up contrast-enhanced CT with renal protocol or MRI with intravenous contrast is advised in 2 months to assess for a lesion. 2.  Punctate focus of air is considered abnormal in the absence of recent catheterization, and can represent infection. Correlation with urinalysis is advised. 3.  No acute gastroenterologic pathology. The study was marked in EPIC for immediate notification. Workstation performed: [id number]       Code Status: Level 1 - Full Code  Advance Directive & Living Will: <no information>  Power of :    POLST:      Medications:  Current Discharge Medication List        Current Discharge Medication List        START taking these medications    Details   Alcohol Swabs 70 % PADS May substitute brand based on insurance coverage. Check glucose TID.  Qty: 100 each, Refills: 0    Associated Diagnoses: Uncontrolled type 2 diabetes mellitus with hyperglycemia (HCC)      Blood Glucose Monitoring Suppl (OneTouch Verio Reflect) w/Device KIT May substitute brand based on insurance coverage. Check glucose TID.  Qty: 1 kit, Refills: 0    Associated Diagnoses:  Uncontrolled type 2 diabetes mellitus with hyperglycemia (HCC)      cefpodoxime (VANTIN) 200 mg tablet Take 1 tablet (200 mg total) by mouth 2 (two) times a day for 6 days Do not start before September 12, 2024.  Qty: 12 tablet, Refills: 0    Associated Diagnoses: UTI (urinary tract infection); Pyelonephritis      glucose blood (OneTouch Verio) test strip May substitute brand based on insurance coverage. Check glucose TID.  Qty: 100 each, Refills: 0    Associated Diagnoses: Uncontrolled type 2 diabetes mellitus with hyperglycemia (HCC)      Insulin Glargine Solostar (Basaglar KwikPen) 100 UNIT/ML SOPN Inject 0.25 mL (25 Units total) under the skin daily at bedtime  Qty: 15 mL, Refills: 0    Associated Diagnoses: Uncontrolled type 2 diabetes mellitus with hyperglycemia (HCC)      !! Insulin Pen Needle (BD Pen Needle Dayna 2nd Gen) 32G X 4 MM MISC For use with insulin pen. Pharmacy may dispense brand covered by insurance.  Qty: 100 each, Refills: 0    Associated Diagnoses: Uncontrolled type 2 diabetes mellitus with hyperglycemia (HCC)      OneTouch Delica Lancets 33G MISC May substitute brand based on insurance coverage. Check glucose TID.  Qty: 100 each, Refills: 0    Associated Diagnoses: Uncontrolled type 2 diabetes mellitus with hyperglycemia (HCC)       !! - Potential duplicate medications found. Please discuss with provider.        Current Discharge Medication List        CONTINUE these medications which have NOT CHANGED    Details   clotrimazole (LOTRIMIN) 1 % cream Apply topically 2 (two) times a day  Qty: 60 g, Refills: 1    Associated Diagnoses: Fungal infection      dulaglutide (Trulicity) 3 MG/0.5ML injection Inject 0.5 mL (3 mg total) under the skin once a week  Qty: 6 mL, Refills: 1    Associated Diagnoses: Uncontrolled type 2 diabetes mellitus with hyperglycemia (HCC)      glipiZIDE (GLUCOTROL) 5 mg tablet Take 1 tablet (5 mg total) by mouth daily with breakfast  Qty: 90 tablet, Refills: 1    Associated  Diagnoses: Uncontrolled type 2 diabetes mellitus with hyperglycemia (HCC)      !! Insulin Pen Needle 29G X 10MM MISC Use once a week  Qty: 13 each, Refills: 1    Associated Diagnoses: Uncontrolled type 2 diabetes mellitus with hyperglycemia (HCC)      losartan-hydrochlorothiazide (HYZAAR) 100-25 MG per tablet Take 1 tablet by mouth daily  Qty: 90 tablet, Refills: 1    Associated Diagnoses: Essential hypertension      meloxicam (Mobic) 15 mg tablet Take 1 tablet (15 mg total) by mouth daily  Qty: 30 tablet, Refills: 0    Associated Diagnoses: Rotator cuff tendonitis, left      ondansetron (ZOFRAN) 4 mg tablet take 1 tablet by mouth once daily if needed for nausea and vomiting      rosuvastatin (CRESTOR) 10 MG tablet Take 1 tablet (10 mg total) by mouth daily  Qty: 90 tablet, Refills: 1    Associated Diagnoses: Mixed hyperlipidemia      !! UltiCare Mini Pen Needles 31G X 6 MM MISC       verapamil (Verelan) 240 MG 24 hr capsule Take 1 capsule (240 mg total) by mouth daily at bedtime  Qty: 90 capsule, Refills: 1    Comments: Please cancel the script for bisoprolol thank you  Associated Diagnoses: Essential hypertension      vitamin B-12 (VITAMIN B-12) 1,000 mcg tablet Take 1 tablet (1,000 mcg total) by mouth daily  Qty: 90 tablet, Refills: 1    Associated Diagnoses: B12 deficiency       !! - Potential duplicate medications found. Please discuss with provider.          Allergies:  Allergies   Allergen Reactions   • Metformin Abdominal Pain   • Sulfa Antibiotics        FOLLOW-UP     PCP Outpatient Follow-up:  Yes, will need to call and arrange follow up with PCP within 1 week of DC    Consulting Providers Follow-up:  none required     Active Issues Requiring Follow-up:   Yes, DM    Discharge Statement:   I spent 45 minutes minutes discharging the patient. This time was spent on the day of discharge. I had direct contact with the patient on the day of discharge. Additional documentation is required if more than 30 minutes  "were spent on discharge.    Portions of the record may have been created with voice recognition software.  Occasional wrong word or \"sound a like\" substitutions may have occurred due to the inherent limitations of voice recognition software.  Read the chart carefully and recognize, using context, where substitutions have occurred.    ==  Baylee Spicer DO  [facility]  Internal Medicine Resident PGY-2    " 195.1

## 2024-09-11 NOTE — DISCHARGE INSTR - AVS FIRST PAGE
Please take cefpodoxime 200 mg twice daily for 6 days starting tomorrow  Please call and follow up with your primary care doctor within 1 week  Please start taking 25 units of long acting insulin tonight and every night at bedtime  Please stop taking glipizide

## 2024-09-11 NOTE — ASSESSMENT & PLAN NOTE
CT A/P with contrast: Area of hypoattenuation in the midpole of the left kidney may represent pyelonephritis or a lesion. Correlation with urinalysis advised. Follow-up contrast-enhanced CT with renal protocol or MRI with intravenous contrast is advised in 2 months to assess for a lesion.  Patient has a one week history of fever and nausea/vomiting, denies urinary symptoms. Denies dysuria, increased urinary frequency, hematuria. UA positive for bacteria, WBC, and leukocytes. Pt has no recorded history of prior UTI or urine cultures.    Plan:   Ucx growing E. Coli. Transition to cefpodoxime on discharge for 6 more days at home to complete 10 day course

## 2024-09-11 NOTE — ASSESSMENT & PLAN NOTE
Home regimen losartan-hydrochlorothiazide 100-25 mg daily   Verapamil 240 mg daily    Plan:   - Cont. Home regimen in the setting of resolved sepsis

## 2024-09-11 NOTE — ASSESSMENT & PLAN NOTE
Lab Results   Component Value Date    HGBA1C 9.7 (H) 09/08/2024       Recent Labs     09/10/24  1613 09/10/24  2107 09/11/24  0737 09/11/24  1114   POCGLU 293* 303* 251* 310*       Blood Sugar Average: Last 72 hrs:  (P) [id number]  Trulicity 3 mg weekly  Glipizide 5 mg daily     Diabetes now poorly controlled with A1c 9.7 on admission and 4 months ago being 6.8.  This could be a contributor to her pyelonephritis.      Plan:   - Increase to 25 units of Lantus at home, continue Trulicity  - F/u outpatient for blood glucose control, will likely need to be uptitrated OP by PCP  - D/c home glipizide

## 2024-09-11 NOTE — PLAN OF CARE
Problem: PAIN - ADULT  Goal: Verbalizes/displays adequate comfort level or baseline comfort level  Description: Interventions:  - Encourage patient to monitor pain and request assistance  - Assess pain using appropriate pain scale  - Administer analgesics based on type and severity of pain and evaluate response  - Implement non-pharmacological measures as appropriate and evaluate response  - Consider cultural and social influences on pain and pain management  - Notify physician/advanced practitioner if interventions unsuccessful or patient reports new pain  Outcome: Progressing     Problem: INFECTION - ADULT  Goal: Absence or prevention of progression during hospitalization  Description: INTERVENTIONS:  - Assess and monitor for signs and symptoms of infection  - Monitor lab/diagnostic results  - Monitor all insertion sites, i.e. indwelling lines, tubes, and drains  - Monitor endotracheal if appropriate and nasal secretions for changes in amount and color  - [place] appropriate cooling/warming therapies per order  - Administer medications as ordered  - Instruct and encourage patient and family to use good hand hygiene technique  - Identify and instruct in appropriate isolation precautions for identified infection/condition  Outcome: Progressing  Goal: Absence of fever/infection during neutropenic period  Description: INTERVENTIONS:  - Monitor WBC    Outcome: Progressing     Problem: SAFETY ADULT  Goal: Patient will remain free of falls  Description: INTERVENTIONS:  - Educate patient/family on patient safety including physical limitations  - Instruct patient to call for assistance with activity   - Consult OT/PT to assist with strengthening/mobility   - Keep Call bell within reach  - Keep bed low and locked with side rails adjusted as appropriate  - Keep care items and personal belongings within reach  - Initiate and maintain comfort rounds  - Make Fall Risk Sign visible to staff  - Apply yellow socks and bracelet  for high fall risk patients  - Consider moving patient to room near nurses station  Outcome: Progressing  Goal: Maintain or return to baseline ADL function  Description: INTERVENTIONS:  -  Assess patient's ability to carry out ADLs; assess patient's baseline for ADL function and identify physical deficits which impact ability to perform ADLs (bathing, care of mouth/teeth, toileting, grooming, dressing, etc.)  - Assess/evaluate cause of self-care deficits   - Assess range of motion  - Assess patient's mobility; develop plan if impaired  - Assess patient's need for assistive devices and provide as appropriate  - Encourage maximum independence but intervene and supervise when necessary  - Involve family in performance of ADLs  - Assess for home care needs following discharge   - Consider OT consult to assist with ADL evaluation and planning for discharge  - Provide patient education as appropriate  Outcome: Progressing  Goal: Maintains/Returns to pre admission functional level  Description: INTERVENTIONS:  - Perform AM-PAC 6 Click Basic Mobility/ Daily Activity assessment daily.  - Set and communicate daily mobility goal to care team and patient/family/caregiver.   - Collaborate with rehabilitation services on mobility goals if consulted  - Out of bed for toileting  - Record patient progress and toleration of activity level   Outcome: Progressing     Problem: NEUROSENSORY - ADULT  Goal: Achieves stable or improved neurological status  Description: INTERVENTIONS  - Monitor and report changes in neurological status  - Monitor vital signs such as temperature, blood pressure, glucose, and any other labs ordered   - Initiate measures to prevent increased intracranial pressure  - Monitor for seizure activity and implement precautions if appropriate      Outcome: Progressing     Problem: GASTROINTESTINAL - ADULT  Goal: Minimal or absence of nausea and/or vomiting  Description: INTERVENTIONS:  - Administer IV fluids if ordered  to ensure adequate hydration  - Maintain NPO status until nausea and vomiting are resolved  - Nasogastric tube if ordered  - Administer ordered antiemetic medications as needed  - Provide nonpharmacologic comfort measures as appropriate  - Advance diet as tolerated, if ordered  - Consider nutrition services referral to assist patient with adequate nutrition and appropriate food choices  Outcome: Progressing  Goal: Maintains or returns to baseline bowel function  Description: INTERVENTIONS:  - Assess bowel function  - Encourage oral fluids to ensure adequate hydration  - Administer IV fluids if ordered to ensure adequate hydration  - Administer ordered medications as needed  - Encourage mobilization and activity  - Consider nutritional services referral to assist patient with adequate nutrition and appropriate food choices  Outcome: Progressing  Goal: Maintains adequate nutritional intake  Description: INTERVENTIONS:  - Monitor percentage of each meal consumed  - Identify factors contributing to decreased intake, treat as appropriate  - Assist with meals as needed  - Monitor I&O, weight, and lab values if indicated  - Obtain nutrition services referral as needed  Outcome: Progressing  Goal: Establish and maintain optimal ostomy function  Description: INTERVENTIONS:  - Assess bowel function  - Encourage oral fluids to ensure adequate hydration  - Administer IV fluids if ordered to ensure adequate hydration   - Administer ordered medications as needed  - Encourage mobilization and activity  - Nutrition services referral to assist patient with appropriate food choices  - Assess stoma site  - Consider wound care consult   Outcome: Progressing  Goal: Oral mucous membranes remain intact  Description: INTERVENTIONS  - Assess oral mucosa and hygiene practices  - Implement preventative oral hygiene regimen  - Implement oral medicated treatments as ordered  - Initiate Nutrition services referral as needed  Outcome:  Progressing     Problem: MUSCULOSKELETAL - ADULT  Goal: Maintain or return mobility to safest level of function  Description: INTERVENTIONS:  - Assess patient's ability to carry out ADLs; assess patient's baseline for ADL function and identify physical deficits which impact ability to perform ADLs (bathing, care of mouth/teeth, toileting, grooming, dressing, etc.)  - Assess/evaluate cause of self-care deficits   - Assess range of motion  - Assess patient's mobility  - Assess patient's need for assistive devices and provide as appropriate  - Encourage maximum independence but intervene and supervise when necessary  - Involve family in performance of ADLs  - Assess for home care needs following discharge   - Consider OT consult to assist with ADL evaluation and planning for discharge  - Provide patient education as appropriate  Outcome: Progressing  Goal: Maintain proper alignment of affected body part  Description: INTERVENTIONS:  - Support, maintain and protect limb and body alignment  - Provide patient/ family with appropriate education  Outcome: Progressing

## 2024-09-11 NOTE — UTILIZATION REVIEW
Continued Stay Review    Date: 9/11/24                          Current Patient Class: Inpatient  Current Level of Care: med surg    HPI:64 y.o. female initially admitted on 9/8/24     Assessment/Plan:  Per Internal Med Dc Note: No acute events overnight.  Denies chest pain, fever, chills, nausea, vomiting, constipation, dysuria.  Patient feels ready to go home. Monet was removed the next day and pt was able to void independently. Urine culture 50-59,000 E coli, sensitivities reviewed. 1/2 blood culture with staph, other blood culture no growth x 48 hours, suspect contaminant. Patient received 4 days of Ceftriaxone IV with resultant resolution of urinary symptoms and fever. Transitioned to po abx outpatient for remaining 6/10 day treatment. Losartan-HCTZ was held during hospital stay d/t hypotension 2/2 sepsis. Pt is to continue losartan-HCTZ at home. Pt is recommended to take 25 units of Lantus at bedtime, continue metformin and Trulicity, discontinue home glipizide, and f/u w/ PCP for tighter glucose control. Insulin education was provided and patient agreed with the addition of Lantus at bedtime.     Vital Signs (last 3 days)       Date/Time Temp Pulse Resp BP MAP (mmHg) SpO2 O2 Device Patient Position - Orthostatic VS [place] Coma Scale Score Pain    09/11/24 07:35:43 98.1 °F (36.7 °C) 87 16 139/74 96 96 % -- -- -- --    09/10/24 23:15:31 99.1 °F (37.3 °C) 81 16 130/70 90 95 % -- -- -- --    09/10/24 1930 -- -- -- -- -- -- None (Room air) -- 15 --    09/10/24 1924 -- -- -- -- -- -- -- -- -- 8    09/10/24 15:48:26 98.9 °F (37.2 °C) 79 16 130/68 89 96 % -- -- -- --    09/10/24 1003 -- -- -- -- -- -- -- -- -- 6    09/10/24 07:30:57 98.5 °F (36.9 °C) 90 16 130/66 87 94 % -- -- -- --    09/10/24 0730 -- -- -- -- -- -- None (Room air) -- 15 5    09/10/24 0045 -- -- -- -- -- -- None (Room air) -- 15 No Pain    09/09/24 22:47:39 99.5 °F (37.5 °C) 90 20 127/64 85 95 % -- -- -- --    09/09/24 15:53:06 98.7 °F (37.1 °C) 89  18 126/64 85 95 % -- -- -- --    09/09/24 1530 -- -- -- -- -- -- None (Room air) -- 15 No Pain    09/09/24 1220 -- -- -- -- -- -- -- -- -- 4 09/09/24 1211 -- -- -- -- -- -- -- -- -- 4 09/09/24 1030 -- -- -- -- -- -- None (Room air) -- 15 No Pain    09/09/24 0923 -- -- -- -- -- -- -- -- -- 4    09/09/24 08:06:14 99.6 °F (37.6 °C) 96 12 126/63 84 95 % -- -- -- --    09/09/24 06:25:15 99.6 °F (37.6 °C) 87 -- -- -- 95 % -- -- -- --    09/09/24 00:37:37 101 °F (38.3 °C) 91 -- -- -- 91 % -- -- -- --    09/08/24 2331 -- -- -- -- -- -- -- -- -- Med Not Given for Pain - for MAR use only    09/08/24 22:48:55 102.5 °F (39.2 °C) 101 18 124/63 83 96 % -- -- -- --    09/08/24 2130 -- -- -- -- -- 94 % None (Room air) -- 15 No Pain    09/08/24 18:03:25 100 °F (37.8 °C) 101 18 110/47 68 97 % -- -- -- --    09/08/24 15:29:06 100.7 °F (38.2 °C) 98 -- 104/45 65 91 % -- -- -- --    09/08/24 1526 -- -- -- -- -- -- None (Room air) -- -- No Pain    09/08/24 1300 -- 124 -- 137/62 -- 90 % -- -- 15 --    09/08/24 1230 -- 126 -- 137/62 -- 90 % -- -- 15 --    09/08/24 1200 -- 132 -- 191/83 -- 92 % None (Room air) -- 15 --    09/08/24 1130 -- 132 -- 156/67 -- 91 % -- -- 15 --    09/08/24 1110 103 °F (39.4 °C) 133 20 120/85 90 98 % None (Room air) Sitting -- --    09/08/24 1107 -- -- -- -- -- -- -- -- -- 10 - Worst Possible Pain          Weight (last 2 days)       None              Pertinent Labs/Diagnostic Results:   Radiology:  CT abdomen pelvis with contrast   Final Interpretation by Laurent Ga MD (09/08 1326)   1.  Area of hypoattenuation in the midpole of the left kidney may represent pyelonephritis or a lesion. Correlation with urinalysis advised. Follow-up contrast-enhanced CT with renal protocol or MRI with intravenous contrast is advised in 2 months to    assess for a lesion.   2.  Punctate focus of air is considered abnormal in the absence of recent catheterization, and can represent infection. Correlation with urinalysis is  advised.   3.  No acute gastroenterologic pathology.      The study was marked in EPIC for immediate notification.         Workstation performed: [id number]         XR chest pa & lateral   Final Interpretation by Destiny Clifton MD (09/09 0754)      No acute cardiopulmonary disease.            Workstation performed: [id number]           Cardiology:  ECG 12 lead   Final Result by Marisela Rodriguez MD (09/09 0117)     Age and gender specific ECG analysis     Sinus tachycardia   Otherwise normal ECG   Confirmed by Marisela Rodriguez ([id number]) on 9/9/2024 1:17:03 AM        GI:  No orders to display           Results from last 7 days   Lab Units 09/11/24  0451 09/10/24  0551 09/09/24  0621 09/09/24  0619 09/08/24  1212   WBC Thousand/uL 6.09 7.30  --  9.63 11.29*   HEMOGLOBIN g/dL 12.6 11.8  --  11.7 13.9   HEMATOCRIT % 38.0 36.3  --  34.9 41.4   PLATELETS Thousands/uL 193 164 150 145* 182   TOTAL NEUT ABS Thousands/µL  --   --   --  7.41 9.25*         Results from last 7 days   Lab Units 09/11/24  0451 09/10/24  0551 09/09/24  0619 09/08/24  1212   SODIUM mmol/L 138 137 137 137   POTASSIUM mmol/L 4.4 4.0 3.3* 3.4*   CHLORIDE mmol/L 100 101 100 98   CO2 mmol/L 31 29 28 29   ANION GAP mmol/L 7 7 9 10   BUN mg/dL 14 11 9 11   CREATININE mg/dL 0.63 0.58* 0.68 0.76   EGFR ml/min/1.73sq m 94 97 92 82   CALCIUM mg/dL 9.5 8.9 8.4 9.7     Results from last 7 days   Lab Units 09/08/24  1212   AST U/L 16   ALT U/L 20   ALK PHOS U/L 79   TOTAL PROTEIN g/dL 7.4   ALBUMIN g/dL 3.9   TOTAL BILIRUBIN mg/dL 0.73     Results from last 7 days   Lab Units 09/11/24  1114 09/11/24  0737 09/10/24  2107 09/10/24  1613 09/10/24  1050 09/10/24  0728 09/09/24  2121 09/09/24  1723 09/09/24  1130 09/09/24  0804 09/08/24  2055 09/08/24  1721   POC GLUCOSE mg/dl 310* 251* 303* 293* 306* 250* 307* 333* 299* 290* 342* 231*     Results from last 7 days   Lab Units 09/11/24  0451 09/10/24  0551 09/09/24  0619 09/08/24  1212   GLUCOSE RANDOM mg/dL 249*  262* 251* 275*         Results from last 7 days   Lab Units 09/08/24  1212   HEMOGLOBIN A1C % 9.7*   EAG mg/dl 232     Results from last 7 days   Lab Units 09/08/24  1212   PROTIME seconds 13.9   INR  1.04   PTT seconds 25         Results from last 7 days   Lab Units 09/08/24  1212   PROCALCITONIN ng/ml 0.50*     Results from last 7 days   Lab Units 09/08/24  1548 09/08/24  1212   LACTIC ACID mmol/L 1.0 2.3*     Results from last 7 days   Lab Units 09/08/24  1212   LIPASE u/L 12       Results from last 7 days   Lab Units 09/08/24  1358 09/08/24  1353   CLARITY UA   --  Turbid   COLOR UA   --  Light Yellow   SPEC GRAV UA   --  1.015   PH UA   --  5.5   GLUCOSE UA mg/dl  --  Negative   KETONES UA mg/dl  --  Negative   BLOOD UA   --  Moderate*   PROTEIN UA mg/dl  --  30 (1+)*   NITRITE UA   --  Negative   BILIRUBIN UA   --  Negative   UROBILINOGEN UA (BE) mg/dl  --  <2.0   LEUKOCYTES UA   --  Large*   WBC UA /hpf Innumerable*  --    RBC UA /hpf 4-10*  --    BACTERIA UA /hpf Moderate*  --    EPITHELIAL CELLS WET PREP /hpf Occasional  --        Results from last 7 days   Lab Units 09/08/24  1358 09/08/24  1212   BLOOD CULTURE   --  Staphylococcus hominis*  Staphylococcus epidermidis*  No Growth at 48 hrs.   GRAM STAIN RESULT   --  Gram positive cocci in clusters*   URINE CULTURE  50,000-59,000 cfu/ml Escherichia coli*  20,000-29,000 cfu/ml  --      Medications:   Scheduled Medications:  cefTRIAXone, 1,000 mg, Intravenous, Q24H  vitamin B-12, 1,000 mcg, Oral, Daily  enoxaparin, 40 mg, Subcutaneous, Daily  insulin glargine, 20 Units, Subcutaneous, HS  insulin lispro, 1-6 Units, Subcutaneous, 4x Daily (AC & HS)  pravastatin, 80 mg, Oral, Daily With Dinner      Continuous IV Infusions: none     PRN Meds:  acetaminophen, 650 mg, Oral, Q6H PRN  ondansetron, 4 mg, Intravenous, Q6H PRN        Discharge Plan: pending discharge to home     Network Utilization Review Department  ATTENTION: Please call with any questions or  concerns to [phone] and carefully listen to the prompts so that you are directed to the right person. All voicemails are confidential.   For Discharge needs, contact Care Management DC Support Team at [phone] opt. 2  Send all requests for admission clinical reviews, approved or denied determinations and any other requests to dedicated fax number below belonging to the [facility] where the patient is receiving treatment. List of dedicated fax numbers for the Facilities:  FACILITY NAME UR FAX NUMBER   ADMISSION DENIALS (Administrative/Medical Necessity) [phone]   DISCHARGE SUPPORT TEAM (NETWORK) [phone]   PARENT CHILD HEALTH (Maternity/NICU/Pediatrics) [phone]   [facility] [phone]   [facility] [phone]   [facility] [phone]   [facility] [phone]   [facility] [phone]   [facility] [phone]   [facility] [phone]   [facility] [phone]   [facility] [phone]   [facility] [phone]   [facility] [phone]   [facility] [phone]

## 2024-09-12 ENCOUNTER — TRANSITIONAL CARE MANAGEMENT (OUTPATIENT)
Dept: INTERNAL MEDICINE CLINIC | Facility: CLINIC | Age: 64
End: 2024-09-12

## 2024-09-12 NOTE — UTILIZATION REVIEW
NOTIFICATION OF ADMISSION DISCHARGE   This is a Notification of Discharge from [facility]. Please be advised that this patient has been discharge from our facility. Below you will find the admission and discharge date and time including the patient’s disposition.   UTILIZATION REVIEW CONTACT:  Marleny Cooper  Utilization   Network Utilization Review Department  Phone: [phone] x carefully listen to the prompts. All voicemails are confidential.  Email: [email or web address]     ADMISSION INFORMATION  PRESENTATION DATE: 9/8/2024 11:10 AM  OBERVATION ADMISSION DATE: N/A  INPATIENT ADMISSION DATE: 9/8/24  2:27 PM   DISCHARGE DATE: 9/11/2024  3:46 PM   DISPOSITION:Home/Self Care    Network Utilization Review Department  ATTENTION: Please call with any questions or concerns to [phone] and carefully listen to the prompts so that you are directed to the right person. All voicemails are confidential.   For Discharge needs, contact Care Management DC Support Team at [phone] opt. 2  Send all requests for admission clinical reviews, approved or denied determinations and any other requests to dedicated fax number below belonging to the campus where the patient is receiving treatment. List of dedicated fax numbers for the Facilities:  FACILITY NAME UR FAX NUMBER   ADMISSION DENIALS (Administrative/Medical Necessity) [phone]   DISCHARGE SUPPORT TEAM ([facility]) [phone]   PARENT CHILD HEALTH (Maternity/NICU/Pediatrics) [phone]   [facility] [phone]   [facility] [phone]   [facility] [phone]   [facility] [phone]   [facility] [phone]   [facility] [phone]   [facility] [phone]   [facility] [phone]    [facility] [phone]   [facility] [phone]   [facility] [phone]   [facility] [phone]

## 2024-09-13 LAB — BACTERIA BLD CULT: NORMAL

## 2024-09-16 ENCOUNTER — OFFICE VISIT (OUTPATIENT)
Dept: INTERNAL MEDICINE CLINIC | Facility: CLINIC | Age: 64
End: 2024-09-16
Payer: COMMERCIAL

## 2024-09-16 VITALS
BODY MASS INDEX: 36.53 KG/M2 | DIASTOLIC BLOOD PRESSURE: 84 MMHG | TEMPERATURE: 97.4 F | SYSTOLIC BLOOD PRESSURE: 122 MMHG | HEIGHT: 58 IN | HEART RATE: 93 BPM | OXYGEN SATURATION: 98 % | WEIGHT: 174 LBS

## 2024-09-16 DIAGNOSIS — M17.0 PRIMARY OSTEOARTHRITIS OF BOTH KNEES: ICD-10-CM

## 2024-09-16 DIAGNOSIS — M12.812 ROTATOR CUFF ARTHROPATHY OF LEFT SHOULDER: ICD-10-CM

## 2024-09-16 DIAGNOSIS — I10 ESSENTIAL HYPERTENSION: ICD-10-CM

## 2024-09-16 DIAGNOSIS — A41.1: Primary | ICD-10-CM

## 2024-09-16 DIAGNOSIS — E11.65 UNCONTROLLED TYPE 2 DIABETES MELLITUS WITH HYPERGLYCEMIA (HCC): ICD-10-CM

## 2024-09-16 DIAGNOSIS — N28.9 KIDNEY LESION, NATIVE, LEFT: ICD-10-CM

## 2024-09-16 DIAGNOSIS — E78.2 MIXED HYPERLIPIDEMIA: ICD-10-CM

## 2024-09-16 DIAGNOSIS — N12 PYELONEPHRITIS: ICD-10-CM

## 2024-09-16 PROCEDURE — 99496 TRANSJ CARE MGMT HIGH F2F 7D: CPT | Performed by: INTERNAL MEDICINE

## 2024-09-16 RX ORDER — BLOOD-GLUCOSE SENSOR
1 EACH MISCELLANEOUS
Qty: 6 EACH | Refills: 3 | Status: SHIPPED | OUTPATIENT
Start: 2024-09-16

## 2024-09-16 NOTE — PROGRESS NOTES
Transitional Care Management Review:  Michelle Rosa is a 64 y.o. female here for TCM follow up.     During the TCM phone call patient stated:    TCM Call       Date and time call was made  9/12/2024  9:20 AM    Hospital care reviewed  Records reviewed    Patient was hospitialized at  [facility]    Comment  [facility]    Date of Admission  09/08/14    Date of discharge  09/11/24    Diagnosis  Pyelonephritis    Disposition  Home    Were the patients medications reviewed and updated  Yes    Current Symptoms  None          TCM Call       Post hospital issues  None    Should patient be enrolled in anticoag monitoring?  No    Scheduled for follow up?  Yes    Did you obtain your prescribed medications  Yes    Do you need help managing your prescriptions or medications  No    Is transportation to your appointment needed  No    I have advised the patient to call PCP with any new or worsening symptoms  Chapis TIWARI    Living Arrangements  Spouse or Significiant other; Family members    Support System  Family    The type of support provided  Emotional    Do you have social support  Yes, as much as I need    Are you recieving any outpatient services  No    Are you recieving home care services  No    Are you using any community resources  No    Current waiver services  No            Fausto Jimenez MD      Assessment/Plan:             1. Sepsis due to coagulase-negative staphylococcal infection (HCC)  Comments:  Continue same med  2. Pyelonephritis  Comments:  Continue same med  Orders:  -     CT renal protocol; Future; Expected date: 11/06/2024  -     Basic metabolic panel; Future  3. Uncontrolled type 2 diabetes mellitus with hyperglycemia (HCC)  Comments:  Continue same med  Orders:  -     Continuous Glucose Sensor (FreeStyle Jaquelin 3 Sensor) MISC; Use 1 each every 14 (fourteen) days  -     Basic metabolic panel; Future  -     Hemoglobin A1C; Future  4. Essential hypertension  Comments:  Continue same med  5. Rotator  cuff arthropathy of left shoulder  6. Primary osteoarthritis of both knees  7. Mixed hyperlipidemia  Comments:  Continue same med  8. Kidney lesion, native, left  -     CT renal protocol; Future; Expected date: 11/06/2024         Subjective:      Patient ID: Michelle Rosa is a 64 y.o. female.    Follow-up from hospitalization, hospital record reviewed, doing well, planning to return to work on 9/18/2024        The following portions of the patient's history were reviewed and updated as appropriate: She  has a past medical history of Diabetes mellitus (HCC), Hypercholesterolemia, Hypertension, Rheumatoid arthritis (HCC), Sarcoidosis, and Sepsis (HCC) (09/08/2024).  She   Patient Active Problem List    Diagnosis Date Noted    Pyelonephritis 09/08/2024    Nontraumatic tear of left supraspinatus tendon 08/30/2024    Rotator cuff arthropathy of left shoulder 07/29/2024    B12 deficiency 04/29/2024    Supraclavicular mass 05/10/2023    Neck swelling 05/10/2023    Primary osteoarthritis of left knee 11/04/2022    Status post left knee replacement 11/04/2022    Rotator cuff tendonitis, left 04/08/2022    Nausea 05/18/2021    Mixed hyperlipidemia 05/18/2021    History of COVID-19 05/18/2021    Essential hypertension, benign 04/21/2021    Primary osteoarthritis of both knees 04/21/2021    Varicose veins of both lower extremities with pain 03/30/2021    Uncontrolled type 2 diabetes mellitus with hyperglycemia (HCC) 03/30/2021    Moderate persistent asthma without complication 12/23/2020    Allergic rhinitis 03/18/2020    Asthma 03/18/2020    Sarcoidosis 03/18/2020    Thalassemia 03/18/2020    Vitamin D deficiency 03/18/2020    Diabetes mellitus (HCC) 03/14/2019    Essential hypertension 03/14/2019     She  has a past surgical history that includes Colonoscopy (05/15/2009); Cholecystectomy; Mammo (historical) (09/30/2016); and Knee surgery (Left, 10/2022).  Her family history includes Coronary artery disease in her father;  Diabetes in her maternal grandmother and mother; No Known Problems in her daughter, daughter, daughter, maternal aunt, maternal aunt, maternal aunt, paternal aunt, paternal aunt, paternal grandmother, sister, sister, sister, son, son, and son; Stomach cancer in her maternal grandfather.  She  reports that she has never smoked. She has never used smokeless tobacco. She reports that she does not currently use alcohol. She reports that she does not use drugs.  Current Outpatient Medications   Medication Sig Dispense Refill    Alcohol Swabs 70 % PADS May substitute brand based on insurance coverage. Check glucose TID. 100 each 0    Blood Glucose Monitoring Suppl (OneTouch Verio Reflect) w/Device KIT May substitute brand based on insurance coverage. Check glucose TID. 1 kit 0    cefpodoxime (VANTIN) 200 mg tablet Take 1 tablet (200 mg total) by mouth 2 (two) times a day for 6 days Do not start before September 12, 2024. 12 tablet 0    Continuous Glucose Sensor (FreeStyle Jaquelin 3 Sensor) MISC Use 1 each every 14 (fourteen) days 6 each 3    dulaglutide (Trulicity) 3 MG/0.5ML injection Inject 0.5 mL (3 mg total) under the skin once a week 6 mL 1    glucose blood (OneTouch Verio) test strip May substitute brand based on insurance coverage. Check glucose TID. 100 each 0    Insulin Glargine Solostar (Basaglar KwikPen) 100 UNIT/ML SOPN Inject 0.25 mL (25 Units total) under the skin daily at bedtime 15 mL 0    Insulin Pen Needle (BD Pen Needle Dayna 2nd Gen) 32G X 4 MM MISC For use with insulin pen. Pharmacy may dispense brand covered by insurance. 100 each 0    losartan-hydrochlorothiazide (HYZAAR) 100-25 MG per tablet Take 1 tablet by mouth daily 90 tablet 1    OneTouch Delica Lancets 33G MISC May substitute brand based on insurance coverage. Check glucose TID. 100 each 0    rosuvastatin (CRESTOR) 10 MG tablet Take 1 tablet (10 mg total) by mouth daily 90 tablet 1    verapamil (Verelan) 240 MG 24 hr capsule Take 1 capsule (240  "mg total) by mouth daily at bedtime 90 capsule 1    Insulin Pen Needle 29G X 10MM MISC Use once a week (Patient not taking: Reported on 9/16/2024) 13 each 1    UltiCare Mini Pen Needles 31G X 6 MM MISC  (Patient not taking: Reported on 9/16/2024)      vitamin B-12 (VITAMIN B-12) 1,000 mcg tablet Take 1 tablet (1,000 mcg total) by mouth daily (Patient not taking: Reported on 9/16/2024) 90 tablet 1     No current facility-administered medications for this visit.     She is allergic to metformin and sulfa antibiotics..    Review of Systems   Constitutional:  Negative for chills and fever.   HENT:  Negative for congestion, ear pain and sore throat.    Eyes:  Negative for pain.   Respiratory:  Negative for cough and shortness of breath.    Cardiovascular:  Negative for chest pain and leg swelling.   Gastrointestinal:  Negative for abdominal pain, nausea and vomiting.   Endocrine: Negative for polyuria.   Genitourinary:  Negative for difficulty urinating, frequency and urgency.   Musculoskeletal:  Positive for arthralgias. Negative for back pain.   Skin:  Negative for rash.   Neurological:  Negative for weakness and headaches.   Psychiatric/Behavioral:  Negative for sleep disturbance. The patient is not nervous/anxious.          Objective:      /84 (BP Location: Left arm, Patient Position: Sitting, Cuff Size: Standard)   Pulse 93   Temp (!) 97.4 °F (36.3 °C) (Temporal)   Ht 4' 10\" (1.473 m)   Wt 78.9 kg (174 lb)   SpO2 98%   BMI 36.37 kg/m²     Recent Results (from the past 336 hour(s))   ECG 12 lead    Collection Time: 09/08/24 12:02 PM   Result Value Ref Range    Ventricular Rate 123 BPM    Atrial Rate 123 BPM    LA Interval 112 ms    QRSD Interval 84 ms    QT Interval 298 ms    QTC Interval 426 ms    P [place] 59 degrees    QRS Axis 59 degrees    T Wave Axis 64 degrees   CBC and differential    Collection Time: 09/08/24 12:12 PM   Result Value Ref Range    WBC 11.29 (H) 4.31 - 10.16 Thousand/uL    RBC 4.77 " 3.81 - 5.12 Million/uL    Hemoglobin 13.9 11.5 - 15.4 g/dL    Hematocrit 41.4 34.8 - 46.1 %    MCV 87 82 - 98 fL    MCH 29.1 26.8 - 34.3 pg    MCHC 33.6 31.4 - 37.4 g/dL    RDW 12.0 11.6 - 15.1 %    MPV 9.0 8.9 - 12.7 fL    Platelets 182 149 - 390 Thousands/uL    nRBC 0 /100 WBCs    Segmented % 82 (H) 43 - 75 %    Immature Grans % 1 0 - 2 %    Lymphocytes % 12 (L) 14 - 44 %    Monocytes % 5 4 - 12 %    Eosinophils Relative 0 0 - 6 %    Basophils Relative 0 0 - 1 %    Absolute Neutrophils 9.25 (H) 1.85 - 7.62 Thousands/µL    Absolute Immature Grans 0.07 0.00 - 0.20 Thousand/uL    Absolute Lymphocytes 1.38 0.60 - 4.47 Thousands/µL    Absolute Monocytes 0.55 0.17 - 1.22 Thousand/µL    Eosinophils Absolute 0.02 0.00 - 0.61 Thousand/µL    Basophils Absolute 0.02 0.00 - 0.10 Thousands/µL   Comprehensive metabolic panel    Collection Time: 09/08/24 12:12 PM   Result Value Ref Range    Sodium 137 135 - 147 mmol/L    Potassium 3.4 (L) 3.5 - 5.3 mmol/L    Chloride 98 96 - 108 mmol/L    CO2 29 21 - 32 mmol/L    ANION GAP 10 4 - 13 mmol/L    BUN 11 5 - 25 mg/dL    Creatinine 0.76 0.60 - 1.30 mg/dL    Glucose 275 (H) 65 - 140 mg/dL    Calcium 9.7 8.4 - 10.2 mg/dL    AST 16 13 - 39 U/L    ALT 20 7 - 52 U/L    Alkaline Phosphatase 79 34 - 104 U/L    Total Protein 7.4 6.4 - 8.4 g/dL    Albumin 3.9 3.5 - 5.0 g/dL    Total Bilirubin 0.73 0.20 - 1.00 mg/dL    eGFR 82 ml/min/1.73sq m   Lactic acid    Collection Time: 09/08/24 12:12 PM   Result Value Ref Range    LACTIC ACID 2.3 (H) 0.5 - 2.0 mmol/L   Procalcitonin    Collection Time: 09/08/24 12:12 PM   Result Value Ref Range    Procalcitonin 0.50 (H) <=0.25 ng/ml   Protime-INR    Collection Time: 09/08/24 12:12 PM   Result Value Ref Range    Protime 13.9 12.3 - 15.0 seconds    INR 1.04 0.85 - 1.19   APTT    Collection Time: 09/08/24 12:12 PM   Result Value Ref Range    PTT 25 23 - 34 seconds   Blood culture #1    Collection Time: 09/08/24 12:12 PM    Specimen: Arm, Right; Blood    Result Value Ref Range    Blood Culture No Growth After 5 Days.    Blood culture #2    Collection Time: 09/08/24 12:12 PM    Specimen: Arm, Right; Blood   Result Value Ref Range    Blood Culture Staphylococcus hominis (A)     Blood Culture Staphylococcus epidermidis (A)     Gram Stain Result Gram positive cocci in clusters (A)        Susceptibility    Staphylococcus epidermidis - JULIO     ZID Performed Yes      Staphylococcus hominis - JULIO     ZID Performed Yes     Lipase    Collection Time: 09/08/24 12:12 PM   Result Value Ref Range    Lipase 12 11 - 82 u/L   Hemoglobin A1c w/EAG Estimation (Prechecked if no A1C within 90 days)    Collection Time: 09/08/24 12:12 PM   Result Value Ref Range    Hemoglobin A1C 9.7 (H) Normal 4.0-5.6%; PreDiabetic 5.7-6.4%; Diabetic >=6.5%; Glycemic control for adults with diabetes <7.0% %     mg/dl   Blood Culture Identification Panel    Collection Time: 09/08/24 12:12 PM    Specimen: Arm, Right; Blood   Result Value Ref Range    Staphylococcus Detected (A) Not Detected   Fingerstick Glucose (POCT)    Collection Time: 09/08/24 12:22 PM   Result Value Ref Range    POC Glucose 282 (H) 65 - 140 mg/dl   UA w Reflex to Microscopic w Reflex to Culture    Collection Time: 09/08/24  1:53 PM    Specimen: Urine   Result Value Ref Range    Color, UA Light Yellow     Clarity, UA Turbid     Specific Gravity, UA 1.015 1.003 - 1.030    pH, UA 5.5 4.5, 5.0, 5.5, 6.0, 6.5, 7.0, 7.5, 8.0    Leukocytes, UA Large (A) Negative    Nitrite, UA Negative Negative    Protein, UA 30 (1+) (A) Negative mg/dl    Glucose, UA Negative Negative mg/dl    Ketones, UA Negative Negative mg/dl    Urobilinogen, UA <2.0 <2.0 mg/dl mg/dl    Bilirubin, UA Negative Negative    Occult Blood, UA Moderate (A) Negative   Urine culture    Collection Time: 09/08/24  1:58 PM    Specimen: Urine   Result Value Ref Range    Urine Culture 50,000-59,000 cfu/ml Escherichia coli (A)     Urine Culture 20,000-29,000 cfu/ml         Susceptibility    Escherichia coli - JULIO     ZID Performed Yes       Amoxicillin + Clavulanate <=8/4 Susceptible ug/ml     Ampicillin ($$) >16.00 Resistant ug/ml     Ampicillin + Sulbactam ($) 8/4 Susceptible ug/ml     Aztreonam ($$$)  <=4 Susceptible ug/ml     Cefazolin ($) 4.00 Susceptible ug/ml     Ciprofloxacin ($)  <=0.25 Susceptible ug/ml     Gentamicin ($$) <=2 Susceptible ug/ml     Levofloxacin ($) <=0.50 Susceptible ug/ml     Nitrofurantoin <=32 Susceptible ug/ml     Tetracycline <=4 Susceptible ug/ml     Trimethoprim + Sulfamethoxazole ($$$) <=0.5/9.5 Susceptible ug/ml   Urine Microscopic    Collection Time: 09/08/24  1:58 PM   Result Value Ref Range    RBC, UA 4-10 (A) None Seen, 1-2 /hpf    WBC, UA Innumerable (A) None Seen, 1-2 /hpf    Epithelial Cells Occasional None Seen, Occasional /hpf    Bacteria, UA Moderate (A) None Seen, Occasional /hpf   Lactic acid 2 Hours    Collection Time: 09/08/24  3:48 PM   Result Value Ref Range    LACTIC ACID 1.0 0.5 - 2.0 mmol/L   Fingerstick Glucose (POCT)    Collection Time: 09/08/24  5:21 PM   Result Value Ref Range    POC Glucose 231 (H) 65 - 140 mg/dl   Fingerstick Glucose (POCT)    Collection Time: 09/08/24  8:55 PM   Result Value Ref Range    POC Glucose 342 (H) 65 - 140 mg/dl   Basic metabolic panel    Collection Time: 09/09/24  6:19 AM   Result Value Ref Range    Sodium 137 135 - 147 mmol/L    Potassium 3.3 (L) 3.5 - 5.3 mmol/L    Chloride 100 96 - 108 mmol/L    CO2 28 21 - 32 mmol/L    ANION GAP 9 4 - 13 mmol/L    BUN 9 5 - 25 mg/dL    Creatinine 0.68 0.60 - 1.30 mg/dL    Glucose 251 (H) 65 - 140 mg/dL    Calcium 8.4 8.4 - 10.2 mg/dL    eGFR 92 ml/min/1.73sq m   CBC and differential    Collection Time: 09/09/24  6:19 AM   Result Value Ref Range    WBC 9.63 4.31 - 10.16 Thousand/uL    RBC 3.89 3.81 - 5.12 Million/uL    Hemoglobin 11.7 11.5 - 15.4 g/dL    Hematocrit 34.9 34.8 - 46.1 %    MCV 90 82 - 98 fL    MCH 30.1 26.8 - 34.3 pg    MCHC 33.5 31.4 - 37.4 g/dL     RDW 12.4 11.6 - 15.1 %    MPV 9.3 8.9 - 12.7 fL    Platelets 145 (L) 149 - 390 Thousands/uL    nRBC 0 /100 WBCs    Segmented % 77 (H) 43 - 75 %    Immature Grans % 1 0 - 2 %    Lymphocytes % 12 (L) 14 - 44 %    Monocytes % 10 4 - 12 %    Eosinophils Relative 0 0 - 6 %    Basophils Relative 0 0 - 1 %    Absolute Neutrophils 7.41 1.85 - 7.62 Thousands/µL    Absolute Immature Grans 0.05 0.00 - 0.20 Thousand/uL    Absolute Lymphocytes 1.16 0.60 - 4.47 Thousands/µL    Absolute Monocytes 0.94 0.17 - 1.22 Thousand/µL    Eosinophils Absolute 0.04 0.00 - 0.61 Thousand/µL    Basophils Absolute 0.03 0.00 - 0.10 Thousands/µL   Platelet count    Collection Time: 09/09/24  6:21 AM   Result Value Ref Range    Platelets 150 149 - 390 Thousands/uL    MPV 9.0 8.9 - 12.7 fL   Fingerstick Glucose (POCT)    Collection Time: 09/09/24  8:04 AM   Result Value Ref Range    POC Glucose 290 (H) 65 - 140 mg/dl   Fingerstick Glucose (POCT)    Collection Time: 09/09/24 11:30 AM   Result Value Ref Range    POC Glucose 299 (H) 65 - 140 mg/dl   Fingerstick Glucose (POCT)    Collection Time: 09/09/24  5:23 PM   Result Value Ref Range    POC Glucose 333 (H) 65 - 140 mg/dl   Fingerstick Glucose (POCT)    Collection Time: 09/09/24  9:21 PM   Result Value Ref Range    POC Glucose 307 (H) 65 - 140 mg/dl   CBC    Collection Time: 09/10/24  5:51 AM   Result Value Ref Range    WBC 7.30 4.31 - 10.16 Thousand/uL    RBC 4.01 3.81 - 5.12 Million/uL    Hemoglobin 11.8 11.5 - 15.4 g/dL    Hematocrit 36.3 34.8 - 46.1 %    MCV 91 82 - 98 fL    MCH 29.4 26.8 - 34.3 pg    MCHC 32.5 31.4 - 37.4 g/dL    RDW 12.2 11.6 - 15.1 %    Platelets 164 149 - 390 Thousands/uL    MPV 9.0 8.9 - 12.7 fL   Basic metabolic panel    Collection Time: 09/10/24  5:51 AM   Result Value Ref Range    Sodium 137 135 - 147 mmol/L    Potassium 4.0 3.5 - 5.3 mmol/L    Chloride 101 96 - 108 mmol/L    CO2 29 21 - 32 mmol/L    ANION GAP 7 4 - 13 mmol/L    BUN 11 5 - 25 mg/dL    Creatinine 0.58  (L) 0.60 - 1.30 mg/dL    Glucose 262 (H) 65 - 140 mg/dL    Calcium 8.9 8.4 - 10.2 mg/dL    eGFR 97 ml/min/1.73sq m   Fingerstick Glucose (POCT)    Collection Time: 09/10/24  7:28 AM   Result Value Ref Range    POC Glucose 250 (H) 65 - 140 mg/dl   Fingerstick Glucose (POCT)    Collection Time: 09/10/24 10:50 AM   Result Value Ref Range    POC Glucose 306 (H) 65 - 140 mg/dl   Fingerstick Glucose (POCT)    Collection Time: 09/10/24  4:13 PM   Result Value Ref Range    POC Glucose 293 (H) 65 - 140 mg/dl   Fingerstick Glucose (POCT)    Collection Time: 09/10/24  9:07 PM   Result Value Ref Range    POC Glucose 303 (H) 65 - 140 mg/dl   CBC    Collection Time: 09/11/24  4:51 AM   Result Value Ref Range    WBC 6.09 4.31 - 10.16 Thousand/uL    RBC 4.29 3.81 - 5.12 Million/uL    Hemoglobin 12.6 11.5 - 15.4 g/dL    Hematocrit 38.0 34.8 - 46.1 %    MCV 89 82 - 98 fL    MCH 29.4 26.8 - 34.3 pg    MCHC 33.2 31.4 - 37.4 g/dL    RDW 12.0 11.6 - 15.1 %    Platelets 193 149 - 390 Thousands/uL    MPV 8.8 (L) 8.9 - 12.7 fL   Basic metabolic panel    Collection Time: 09/11/24  4:51 AM   Result Value Ref Range    Sodium 138 135 - 147 mmol/L    Potassium 4.4 3.5 - 5.3 mmol/L    Chloride 100 96 - 108 mmol/L    CO2 31 21 - 32 mmol/L    ANION GAP 7 4 - 13 mmol/L    BUN 14 5 - 25 mg/dL    Creatinine 0.63 0.60 - 1.30 mg/dL    Glucose 249 (H) 65 - 140 mg/dL    Calcium 9.5 8.4 - 10.2 mg/dL    eGFR 94 ml/min/1.73sq m   Fingerstick Glucose (POCT)    Collection Time: 09/11/24  7:37 AM   Result Value Ref Range    POC Glucose 251 (H) 65 - 140 mg/dl   Fingerstick Glucose (POCT)    Collection Time: 09/11/24 11:14 AM   Result Value Ref Range    POC Glucose 310 (H) 65 - 140 mg/dl        Physical Exam  Constitutional:       Appearance: Normal appearance.   HENT:      Head: Normocephalic.      Right Ear: Tympanic membrane, ear canal and external ear normal.      Left Ear: Tympanic membrane, ear canal and external ear normal.      Nose: Nose normal. No  congestion.      Mouth/Throat:      Mouth: Mucous membranes are moist.      Pharynx: Oropharynx is clear. No oropharyngeal exudate or posterior oropharyngeal erythema.   Eyes:      Extraocular Movements: Extraocular movements intact.      Conjunctiva/sclera: Conjunctivae normal.   Cardiovascular:      Rate and Rhythm: Normal rate and regular rhythm.      Heart sounds: Normal heart sounds. No murmur heard.  Pulmonary:      Effort: Pulmonary effort is normal.      Breath sounds: Normal breath sounds. No wheezing or rales.   Abdominal:      General: Abdomen is flat. There is no distension.      Palpations: Abdomen is soft.      Tenderness: There is no abdominal tenderness.   Musculoskeletal:      Cervical back: Normal range of motion and neck supple.      Right lower leg: No edema.      Left lower leg: No edema.   Lymphadenopathy:      Cervical: No cervical adenopathy.   Skin:     General: Skin is warm.   Neurological:      General: No focal deficit present.      Mental Status: She is alert and oriented to person, place, and time.

## 2024-10-09 PROBLEM — N12 PYELONEPHRITIS: Status: RESOLVED | Noted: 2024-09-08 | Resolved: 2024-10-09

## 2024-10-17 ENCOUNTER — TELEPHONE (OUTPATIENT)
Dept: INTERNAL MEDICINE CLINIC | Facility: CLINIC | Age: 64
End: 2024-10-17

## 2024-10-17 ENCOUNTER — HOSPITAL ENCOUNTER (OUTPATIENT)
Dept: RADIOLOGY | Age: 64
Discharge: HOME | End: 2024-10-17
Payer: COMMERCIAL

## 2024-10-17 DIAGNOSIS — Z13.820 SCREENING FOR OSTEOPOROSIS: ICD-10-CM

## 2024-10-17 DIAGNOSIS — E11.65 UNCONTROLLED TYPE 2 DIABETES MELLITUS WITH HYPERGLYCEMIA (HCC): Primary | ICD-10-CM

## 2024-10-17 LAB
ALBUMIN SERPL-MCNC: 3.9 G/DL (ref 3.5–5.7)
ALBUMIN/CREAT UR: 16.6
ALP SERPL-CCNC: 69 U/L (ref 35–120)
ALT SERPL-CCNC: 14 U/L
ANION GAP SERPL CALCULATED.3IONS-SCNC: 11 MMOL/L (ref 3–11)
AST SERPL-CCNC: 17 U/L
BASOPHILS # BLD AUTO: 0 THOU/CMM (ref 0–0.1)
BASOPHILS NFR BLD AUTO: 1 %
BILIRUB SERPL-MCNC: 0.6 MG/DL (ref 0.2–1)
BUN SERPL-MCNC: 22 MG/DL (ref 7–25)
CALCIUM SERPL-MCNC: 10 MG/DL (ref 8.5–10.1)
CHLORIDE SERPL-SCNC: 100 MMOL/L (ref 100–109)
CHOLEST SERPL-MCNC: 151 MG/DL
CHOLEST/HDLC SERPL: 3.1 {RATIO}
CO2 SERPL-SCNC: 28 MMOL/L (ref 21–31)
CREAT SERPL-MCNC: 0.57 MG/DL (ref 0.4–1.1)
CREAT UR-MCNC: 54.3 MG/DL (ref 50–200)
CYTOLOGY CMNT CVX/VAG CYTO-IMP: ABNORMAL
DIFFERENTIAL METHOD BLD: NORMAL
EOSINOPHIL # BLD AUTO: 0.1 THOU/CMM (ref 0–0.5)
EOSINOPHIL NFR BLD AUTO: 1 %
ERYTHROCYTE [DISTWIDTH] IN BLOOD BY AUTOMATED COUNT: 13.6 % (ref 12–16)
EST. AVERAGE GLUCOSE BLD GHB EST-MCNC: 220 MG/DL
GFR/BSA.PRED SERPLBLD CYS-BASED-ARV: 100 ML/MIN/{1.73_M2}
GLUCOSE SERPL-MCNC: 167 MG/DL (ref 65–99)
HBA1C MFR BLD: 9.3 %
HCT VFR BLD AUTO: 38 % (ref 35–43)
HDLC SERPL-MCNC: 48 MG/DL (ref 23–92)
HGB BLD-MCNC: 13.1 G/DL (ref 11.5–14.5)
LDLC SERPL CALC-MCNC: 82 MG/DL
LYMPHOCYTES # BLD AUTO: 1.7 THOU/CMM (ref 1–3)
LYMPHOCYTES NFR BLD AUTO: 34 %
MCH RBC QN AUTO: 30 PG (ref 26–34)
MCHC RBC AUTO-ENTMCNC: 34.4 G/DL (ref 32–37)
MCV RBC AUTO: 87 FL (ref 80–100)
MICROALBUMIN UR-MCNC: 0.9 MG/DL
MONOCYTES # BLD AUTO: 0.4 THOU/CMM (ref 0.3–1)
MONOCYTES NFR BLD AUTO: 8 %
NEUTROPHILS # BLD AUTO: 2.8 THOU/CMM (ref 1.8–7.8)
NEUTROPHILS NFR BLD AUTO: 56 %
NONHDLC SERPL-MCNC: 103 MG/DL
PLATELET # BLD AUTO: 187 THOU/CMM (ref 140–350)
PMV BLD REES-ECKER: 7.5 FL (ref 7.5–11.3)
POTASSIUM SERPL-SCNC: 4.2 MMOL/L (ref 3.5–5.2)
PROT SERPL-MCNC: 6.9 G/DL (ref 6.3–8.3)
RBC # BLD AUTO: 4.36 MILL/CMM (ref 3.7–4.7)
SODIUM SERPL-SCNC: 139 MMOL/L (ref 135–145)
TRIGL SERPL-MCNC: 103 MG/DL
TSH SERPL-ACNC: 0.85 UIU/ML (ref 0.45–5.33)
VIT B12 SERPL-MCNC: 393 PG/ML (ref 180–914)
WBC # BLD AUTO: 5.1 THOU/CMM (ref 4–10)

## 2024-10-17 PROCEDURE — 77080 DXA BONE DENSITY AXIAL: CPT

## 2024-10-17 NOTE — TELEPHONE ENCOUNTER
----- Message from Fausto Jimenez MD sent at 10/17/2024 12:49 PM EDT -----  Can u ask if they can do HBA1C from blood they draw this morning, I put order in [email or web address]

## 2024-10-21 ENCOUNTER — OFFICE VISIT (OUTPATIENT)
Dept: INTERNAL MEDICINE CLINIC | Facility: CLINIC | Age: 64
End: 2024-10-21
Payer: COMMERCIAL

## 2024-10-21 VITALS
BODY MASS INDEX: 37.36 KG/M2 | HEIGHT: 58 IN | TEMPERATURE: 98.4 F | SYSTOLIC BLOOD PRESSURE: 136 MMHG | WEIGHT: 178 LBS | OXYGEN SATURATION: 99 % | DIASTOLIC BLOOD PRESSURE: 86 MMHG | HEART RATE: 88 BPM

## 2024-10-21 DIAGNOSIS — I10 ESSENTIAL HYPERTENSION: ICD-10-CM

## 2024-10-21 DIAGNOSIS — I10 ESSENTIAL HYPERTENSION, BENIGN: ICD-10-CM

## 2024-10-21 DIAGNOSIS — E11.65 UNCONTROLLED TYPE 2 DIABETES MELLITUS WITH HYPERGLYCEMIA (HCC): Primary | ICD-10-CM

## 2024-10-21 DIAGNOSIS — M12.812 ROTATOR CUFF ARTHROPATHY OF LEFT SHOULDER: ICD-10-CM

## 2024-10-21 DIAGNOSIS — Z23 ENCOUNTER FOR IMMUNIZATION: ICD-10-CM

## 2024-10-21 DIAGNOSIS — E53.8 B12 DEFICIENCY: ICD-10-CM

## 2024-10-21 DIAGNOSIS — E78.2 MIXED HYPERLIPIDEMIA: ICD-10-CM

## 2024-10-21 PROCEDURE — 99214 OFFICE O/P EST MOD 30 MIN: CPT | Performed by: INTERNAL MEDICINE

## 2024-10-21 PROCEDURE — 90471 IMMUNIZATION ADMIN: CPT

## 2024-10-21 PROCEDURE — 90677 PCV20 VACCINE IM: CPT

## 2024-10-21 RX ORDER — VERAPAMIL HYDROCHLORIDE 240 MG/1
240 CAPSULE, EXTENDED RELEASE ORAL
Qty: 90 CAPSULE | Refills: 1 | Status: SHIPPED | OUTPATIENT
Start: 2024-10-21

## 2024-10-21 RX ORDER — LANOLIN ALCOHOL/MO/W.PET/CERES
1000 CREAM (GRAM) TOPICAL DAILY
Qty: 90 TABLET | Refills: 1 | Status: SHIPPED | OUTPATIENT
Start: 2024-10-21

## 2024-10-21 RX ORDER — LOSARTAN POTASSIUM AND HYDROCHLOROTHIAZIDE 25; 100 MG/1; MG/1
1 TABLET ORAL DAILY
Qty: 90 TABLET | Refills: 1 | Status: SHIPPED | OUTPATIENT
Start: 2024-10-21

## 2024-10-21 NOTE — PROGRESS NOTES
Assessment/Plan:      Falls Plan of Care: balance, strength, and gait training instructions were provided.     Urinary Incontinence Plan of Care: counseling topics discussed: use restroom every 2 hours and limit alcohol, caffeine, spicy foods, and acidic foods.             1. Uncontrolled type 2 diabetes mellitus with hyperglycemia (HCC)  Comments:  Stop Trulicity and start Mounjaro, keep checking sugar at home  Orders:  -     tirzepatide (Mounjaro) 5 MG/0.5ML; Inject 0.5 mL (5 mg total) under the skin every 7 days  2. Essential hypertension, benign  Comments:  continue same med  3. Rotator cuff arthropathy of left shoulder  4. B12 deficiency  -     vitamin B-12 (VITAMIN B-12) 1,000 mcg tablet; Take 1 tablet (1,000 mcg total) by mouth daily  5. Mixed hyperlipidemia  Comments:  continue same med  6. Essential hypertension  Comments:  Stable, continue same medication  Orders:  -     losartan-hydrochlorothiazide (HYZAAR) 100-25 MG per tablet; Take 1 tablet by mouth daily  -     verapamil (Verelan) 240 MG 24 hr capsule; Take 1 capsule (240 mg total) by mouth daily at bedtime         Subjective:      Patient ID: Michelle Rosa is a 64 y.o. female.    Follow-up on blood and urine test done on 10/17/2024, test discussed with her        The following portions of the patient's history were reviewed and updated as appropriate: She  has a past medical history of Diabetes mellitus (HCC), Hypercholesterolemia, Hypertension, Rheumatoid arthritis (HCC), Sarcoidosis, and Sepsis (HCC) (09/08/2024).  She   Patient Active Problem List    Diagnosis Date Noted    Nontraumatic tear of left supraspinatus tendon 08/30/2024    Rotator cuff arthropathy of left shoulder 07/29/2024    B12 deficiency 04/29/2024    Supraclavicular mass 05/10/2023    Neck swelling 05/10/2023    Primary osteoarthritis of left knee 11/04/2022    Status post left knee replacement 11/04/2022    Rotator cuff tendonitis, left 04/08/2022    Nausea 05/18/2021    Mixed  hyperlipidemia 05/18/2021    History of COVID-19 05/18/2021    Essential hypertension, benign 04/21/2021    Primary osteoarthritis of both knees 04/21/2021    Varicose veins of both lower extremities with pain 03/30/2021    Uncontrolled type 2 diabetes mellitus with hyperglycemia (HCC) 03/30/2021    Moderate persistent asthma without complication 12/23/2020    Allergic rhinitis 03/18/2020    Asthma 03/18/2020    Sarcoidosis 03/18/2020    Thalassemia 03/18/2020    Vitamin D deficiency 03/18/2020    Diabetes mellitus (HCC) 03/14/2019    Essential hypertension 03/14/2019     She  has a past surgical history that includes Colonoscopy (05/15/2009); Cholecystectomy; Mammo (historical) (09/30/2016); and Knee surgery (Left, 10/2022).  Her family history includes Coronary artery disease in her father; Diabetes in her maternal grandmother and mother; No Known Problems in her daughter, daughter, daughter, maternal aunt, maternal aunt, maternal aunt, paternal aunt, paternal aunt, paternal grandmother, sister, sister, sister, son, son, and son; Stomach cancer in her maternal grandfather.  She  reports that she has never smoked. She has never used smokeless tobacco. She reports that she does not currently use alcohol. She reports that she does not use drugs.  Current Outpatient Medications   Medication Sig Dispense Refill    Alcohol Swabs 70 % PADS May substitute brand based on insurance coverage. Check glucose TID. 100 each 0    Blood Glucose Monitoring Suppl (OneTouch Verio Reflect) w/Device KIT May substitute brand based on insurance coverage. Check glucose TID. 1 kit 0    Insulin Glargine Solostar (Basaglar KwikPen) 100 UNIT/ML SOPN Inject 0.25 mL (25 Units total) under the skin daily at bedtime 15 mL 0    Insulin Pen Needle (BD Pen Needle Dayna 2nd Gen) 32G X 4 MM MISC For use with insulin pen. Pharmacy may dispense brand covered by insurance. 100 each 0    losartan-hydrochlorothiazide (HYZAAR) 100-25 MG per tablet Take 1  tablet by mouth daily 90 tablet 1    rosuvastatin (CRESTOR) 10 MG tablet Take 1 tablet (10 mg total) by mouth daily 90 tablet 1    tirzepatide (Mounjaro) 5 MG/0.5ML Inject 0.5 mL (5 mg total) under the skin every 7 days 2 mL 2    verapamil (Verelan) 240 MG 24 hr capsule Take 1 capsule (240 mg total) by mouth daily at bedtime 90 capsule 1    vitamin B-12 (VITAMIN B-12) 1,000 mcg tablet Take 1 tablet (1,000 mcg total) by mouth daily 90 tablet 1    Continuous Glucose Sensor (FreeStyle Jaquelin 3 Sensor) MISC Use 1 each every 14 (fourteen) days (Patient not taking: Reported on 10/21/2024) 6 each 3    glucose blood (OneTouch Verio) test strip May substitute brand based on insurance coverage. Check glucose TID. (Patient not taking: Reported on 10/21/2024) 100 each 0    Insulin Pen Needle 29G X 10MM MISC Use once a week (Patient not taking: Reported on 9/16/2024) 13 each 1    OneTouch Delica Lancets 33G MISC May substitute brand based on insurance coverage. Check glucose TID. 100 each 0    UltiCare Mini Pen Needles 31G X 6 MM MISC  (Patient not taking: Reported on 9/16/2024)       No current facility-administered medications for this visit.     Current Outpatient Medications on File Prior to Visit   Medication Sig    Alcohol Swabs 70 % PADS May substitute brand based on insurance coverage. Check glucose TID.    Blood Glucose Monitoring Suppl (OneTouch Verio Reflect) w/Device KIT May substitute brand based on insurance coverage. Check glucose TID.    Insulin Glargine Solostar (Basaglar KwikPen) 100 UNIT/ML SOPN Inject 0.25 mL (25 Units total) under the skin daily at bedtime    Insulin Pen Needle (BD Pen Needle Dayna 2nd Gen) 32G X 4 MM MISC For use with insulin pen. Pharmacy may dispense brand covered by insurance.    rosuvastatin (CRESTOR) 10 MG tablet Take 1 tablet (10 mg total) by mouth daily    [DISCONTINUED] dulaglutide (Trulicity) 3 MG/0.5ML injection Inject 0.5 mL (3 mg total) under the skin once a week    [DISCONTINUED]  losartan-hydrochlorothiazide (HYZAAR) 100-25 MG per tablet Take 1 tablet by mouth daily    [DISCONTINUED] verapamil (Verelan) 240 MG 24 hr capsule Take 1 capsule (240 mg total) by mouth daily at bedtime    Continuous Glucose Sensor (FreeStyle Jaquelin 3 Sensor) MISC Use 1 each every 14 (fourteen) days (Patient not taking: Reported on 10/21/2024)    glucose blood (OneTouch Verio) test strip May substitute brand based on insurance coverage. Check glucose TID. (Patient not taking: Reported on 10/21/2024)    Insulin Pen Needle 29G X 10MM MISC Use once a week (Patient not taking: Reported on 9/16/2024)    OneTouch Delica Lancets 33G MISC May substitute brand based on insurance coverage. Check glucose TID.    UltiCare Mini Pen Needles 31G X 6 MM MISC  (Patient not taking: Reported on 9/16/2024)    [DISCONTINUED] vitamin B-12 (VITAMIN B-12) 1,000 mcg tablet Take 1 tablet (1,000 mcg total) by mouth daily (Patient not taking: Reported on 9/16/2024)     No current facility-administered medications on file prior to visit.     She is allergic to metformin and sulfa antibiotics..    Review of Systems   Constitutional:  Negative for chills and fever.   HENT:  Negative for congestion, ear pain and sore throat.    Eyes:  Negative for pain.   Respiratory:  Negative for cough and shortness of breath.    Cardiovascular:  Negative for chest pain and leg swelling.   Gastrointestinal:  Negative for abdominal pain, nausea and vomiting.   Endocrine: Negative for polyuria.   Genitourinary:  Negative for difficulty urinating, frequency and urgency.   Musculoskeletal:  Positive for arthralgias. Negative for back pain.   Skin:  Negative for rash.   Neurological:  Negative for weakness and headaches.   Psychiatric/Behavioral:  Negative for sleep disturbance. The patient is not nervous/anxious.          Objective:      /86 (BP Location: Left arm, Patient Position: Sitting, Cuff Size: Standard)   Pulse 88   Temp 98.4 °F (36.9 °C) (Tympanic)   " Ht 4' 10\" (1.473 m)   Wt 80.7 kg (178 lb)   SpO2 99%   BMI 37.20 kg/m²     Recent Results (from the past 1344 hour(s))   ECG 12 lead    Collection Time: 09/08/24 12:02 PM   Result Value Ref Range    Ventricular Rate 123 BPM    Atrial Rate 123 BPM    IL Interval 112 ms    QRSD Interval 84 ms    QT Interval 298 ms    QTC Interval 426 ms    P [place] 59 degrees    QRS Axis 59 degrees    T Wave Axis 64 degrees   CBC and differential    Collection Time: 09/08/24 12:12 PM   Result Value Ref Range    WBC 11.29 (H) 4.31 - 10.16 Thousand/uL    RBC 4.77 3.81 - 5.12 Million/uL    Hemoglobin 13.9 11.5 - 15.4 g/dL    Hematocrit 41.4 34.8 - 46.1 %    MCV 87 82 - 98 fL    MCH 29.1 26.8 - 34.3 pg    MCHC 33.6 31.4 - 37.4 g/dL    RDW 12.0 11.6 - 15.1 %    MPV 9.0 8.9 - 12.7 fL    Platelets 182 149 - 390 Thousands/uL    nRBC 0 /100 WBCs    Segmented % 82 (H) 43 - 75 %    Immature Grans % 1 0 - 2 %    Lymphocytes % 12 (L) 14 - 44 %    Monocytes % 5 4 - 12 %    Eosinophils Relative 0 0 - 6 %    Basophils Relative 0 0 - 1 %    Absolute Neutrophils 9.25 (H) 1.85 - 7.62 Thousands/µL    Absolute Immature Grans 0.07 0.00 - 0.20 Thousand/uL    Absolute Lymphocytes 1.38 0.60 - 4.47 Thousands/µL    Absolute Monocytes 0.55 0.17 - 1.22 Thousand/µL    Eosinophils Absolute 0.02 0.00 - 0.61 Thousand/µL    Basophils Absolute 0.02 0.00 - 0.10 Thousands/µL   Comprehensive metabolic panel    Collection Time: 09/08/24 12:12 PM   Result Value Ref Range    Sodium 137 135 - 147 mmol/L    Potassium 3.4 (L) 3.5 - 5.3 mmol/L    Chloride 98 96 - 108 mmol/L    CO2 29 21 - 32 mmol/L    ANION GAP 10 4 - 13 mmol/L    BUN 11 5 - 25 mg/dL    Creatinine 0.76 0.60 - 1.30 mg/dL    Glucose 275 (H) 65 - 140 mg/dL    Calcium 9.7 8.4 - 10.2 mg/dL    AST 16 13 - 39 U/L    ALT 20 7 - 52 U/L    Alkaline Phosphatase 79 34 - 104 U/L    Total Protein 7.4 6.4 - 8.4 g/dL    Albumin 3.9 3.5 - 5.0 g/dL    Total Bilirubin 0.73 0.20 - 1.00 mg/dL    eGFR 82 ml/min/1.73sq m   Lactic " acid    Collection Time: 09/08/24 12:12 PM   Result Value Ref Range    LACTIC ACID 2.3 (H) 0.5 - 2.0 mmol/L   Procalcitonin    Collection Time: 09/08/24 12:12 PM   Result Value Ref Range    Procalcitonin 0.50 (H) <=0.25 ng/ml   Protime-INR    Collection Time: 09/08/24 12:12 PM   Result Value Ref Range    Protime 13.9 12.3 - 15.0 seconds    INR 1.04 0.85 - 1.19   APTT    Collection Time: 09/08/24 12:12 PM   Result Value Ref Range    PTT 25 23 - 34 seconds   Blood culture #1    Collection Time: 09/08/24 12:12 PM    Specimen: Arm, Right; Blood   Result Value Ref Range    Blood Culture No Growth After 5 Days.    Blood culture #2    Collection Time: 09/08/24 12:12 PM    Specimen: Arm, Right; Blood   Result Value Ref Range    Blood Culture Staphylococcus hominis (A)     Blood Culture Staphylococcus epidermidis (A)     Gram Stain Result Gram positive cocci in clusters (A)        Susceptibility    Staphylococcus epidermidis - JULIO     ZID Performed Yes      Staphylococcus hominis - JULIO     ZID Performed Yes     Lipase    Collection Time: 09/08/24 12:12 PM   Result Value Ref Range    Lipase 12 11 - 82 u/L   Hemoglobin A1c w/EAG Estimation (Prechecked if no A1C within 90 days)    Collection Time: 09/08/24 12:12 PM   Result Value Ref Range    Hemoglobin A1C 9.7 (H) Normal 4.0-5.6%; PreDiabetic 5.7-6.4%; Diabetic >=6.5%; Glycemic control for adults with diabetes <7.0% %     mg/dl   Blood Culture Identification Panel    Collection Time: 09/08/24 12:12 PM    Specimen: Arm, Right; Blood   Result Value Ref Range    Staphylococcus Detected (A) Not Detected   Fingerstick Glucose (POCT)    Collection Time: 09/08/24 12:22 PM   Result Value Ref Range    POC Glucose 282 (H) 65 - 140 mg/dl   UA w Reflex to Microscopic w Reflex to Culture    Collection Time: 09/08/24  1:53 PM    Specimen: Urine   Result Value Ref Range    Color, UA Light Yellow     Clarity, UA Turbid     Specific Gravity, UA 1.015 1.003 - 1.030    pH, UA 5.5 4.5,  5.0, 5.5, 6.0, 6.5, 7.0, 7.5, 8.0    Leukocytes, UA Large (A) Negative    Nitrite, UA Negative Negative    Protein, UA 30 (1+) (A) Negative mg/dl    Glucose, UA Negative Negative mg/dl    Ketones, UA Negative Negative mg/dl    Urobilinogen, UA <2.0 <2.0 mg/dl mg/dl    Bilirubin, UA Negative Negative    Occult Blood, UA Moderate (A) Negative   Urine culture    Collection Time: 09/08/24  1:58 PM    Specimen: Urine   Result Value Ref Range    Urine Culture 50,000-59,000 cfu/ml Escherichia coli (A)     Urine Culture 20,000-29,000 cfu/ml        Susceptibility    Escherichia coli - JULIO     ZID Performed Yes       Amoxicillin + Clavulanate <=8/4 Susceptible ug/ml     Ampicillin ($$) >16.00 Resistant ug/ml     Ampicillin + Sulbactam ($) 8/4 Susceptible ug/ml     Aztreonam ($$$)  <=4 Susceptible ug/ml     Cefazolin ($) 4.00 Susceptible ug/ml     Ciprofloxacin ($)  <=0.25 Susceptible ug/ml     Gentamicin ($$) <=2 Susceptible ug/ml     Levofloxacin ($) <=0.50 Susceptible ug/ml     Nitrofurantoin <=32 Susceptible ug/ml     Tetracycline <=4 Susceptible ug/ml     Trimethoprim + Sulfamethoxazole ($$$) <=0.5/9.5 Susceptible ug/ml   Urine Microscopic    Collection Time: 09/08/24  1:58 PM   Result Value Ref Range    RBC, UA 4-10 (A) None Seen, 1-2 /hpf    WBC, UA Innumerable (A) None Seen, 1-2 /hpf    Epithelial Cells Occasional None Seen, Occasional /hpf    Bacteria, UA Moderate (A) None Seen, Occasional /hpf   Lactic acid 2 Hours    Collection Time: 09/08/24  3:48 PM   Result Value Ref Range    LACTIC ACID 1.0 0.5 - 2.0 mmol/L   Fingerstick Glucose (POCT)    Collection Time: 09/08/24  5:21 PM   Result Value Ref Range    POC Glucose 231 (H) 65 - 140 mg/dl   Fingerstick Glucose (POCT)    Collection Time: 09/08/24  8:55 PM   Result Value Ref Range    POC Glucose 342 (H) 65 - 140 mg/dl   Basic metabolic panel    Collection Time: 09/09/24  6:19 AM   Result Value Ref Range    Sodium 137 135 - 147 mmol/L    Potassium 3.3 (L) 3.5 - 5.3  mmol/L    Chloride 100 96 - 108 mmol/L    CO2 28 21 - 32 mmol/L    ANION GAP 9 4 - 13 mmol/L    BUN 9 5 - 25 mg/dL    Creatinine 0.68 0.60 - 1.30 mg/dL    Glucose 251 (H) 65 - 140 mg/dL    Calcium 8.4 8.4 - 10.2 mg/dL    eGFR 92 ml/min/1.73sq m   CBC and differential    Collection Time: 09/09/24  6:19 AM   Result Value Ref Range    WBC 9.63 4.31 - 10.16 Thousand/uL    RBC 3.89 3.81 - 5.12 Million/uL    Hemoglobin 11.7 11.5 - 15.4 g/dL    Hematocrit 34.9 34.8 - 46.1 %    MCV 90 82 - 98 fL    MCH 30.1 26.8 - 34.3 pg    MCHC 33.5 31.4 - 37.4 g/dL    RDW 12.4 11.6 - 15.1 %    MPV 9.3 8.9 - 12.7 fL    Platelets 145 (L) 149 - 390 Thousands/uL    nRBC 0 /100 WBCs    Segmented % 77 (H) 43 - 75 %    Immature Grans % 1 0 - 2 %    Lymphocytes % 12 (L) 14 - 44 %    Monocytes % 10 4 - 12 %    Eosinophils Relative 0 0 - 6 %    Basophils Relative 0 0 - 1 %    Absolute Neutrophils 7.41 1.85 - 7.62 Thousands/µL    Absolute Immature Grans 0.05 0.00 - 0.20 Thousand/uL    Absolute Lymphocytes 1.16 0.60 - 4.47 Thousands/µL    Absolute Monocytes 0.94 0.17 - 1.22 Thousand/µL    Eosinophils Absolute 0.04 0.00 - 0.61 Thousand/µL    Basophils Absolute 0.03 0.00 - 0.10 Thousands/µL   Platelet count    Collection Time: 09/09/24  6:21 AM   Result Value Ref Range    Platelets 150 149 - 390 Thousands/uL    MPV 9.0 8.9 - 12.7 fL   Fingerstick Glucose (POCT)    Collection Time: 09/09/24  8:04 AM   Result Value Ref Range    POC Glucose 290 (H) 65 - 140 mg/dl   Fingerstick Glucose (POCT)    Collection Time: 09/09/24 11:30 AM   Result Value Ref Range    POC Glucose 299 (H) 65 - 140 mg/dl   Fingerstick Glucose (POCT)    Collection Time: 09/09/24  5:23 PM   Result Value Ref Range    POC Glucose 333 (H) 65 - 140 mg/dl   Fingerstick Glucose (POCT)    Collection Time: 09/09/24  9:21 PM   Result Value Ref Range    POC Glucose 307 (H) 65 - 140 mg/dl   CBC    Collection Time: 09/10/24  5:51 AM   Result Value Ref Range    WBC 7.30 4.31 - 10.16 Thousand/uL     RBC 4.01 3.81 - 5.12 Million/uL    Hemoglobin 11.8 11.5 - 15.4 g/dL    Hematocrit 36.3 34.8 - 46.1 %    MCV 91 82 - 98 fL    MCH 29.4 26.8 - 34.3 pg    MCHC 32.5 31.4 - 37.4 g/dL    RDW 12.2 11.6 - 15.1 %    Platelets 164 149 - 390 Thousands/uL    MPV 9.0 8.9 - 12.7 fL   Basic metabolic panel    Collection Time: 09/10/24  5:51 AM   Result Value Ref Range    Sodium 137 135 - 147 mmol/L    Potassium 4.0 3.5 - 5.3 mmol/L    Chloride 101 96 - 108 mmol/L    CO2 29 21 - 32 mmol/L    ANION GAP 7 4 - 13 mmol/L    BUN 11 5 - 25 mg/dL    Creatinine 0.58 (L) 0.60 - 1.30 mg/dL    Glucose 262 (H) 65 - 140 mg/dL    Calcium 8.9 8.4 - 10.2 mg/dL    eGFR 97 ml/min/1.73sq m   Fingerstick Glucose (POCT)    Collection Time: 09/10/24  7:28 AM   Result Value Ref Range    POC Glucose 250 (H) 65 - 140 mg/dl   Fingerstick Glucose (POCT)    Collection Time: 09/10/24 10:50 AM   Result Value Ref Range    POC Glucose 306 (H) 65 - 140 mg/dl   Fingerstick Glucose (POCT)    Collection Time: 09/10/24  4:13 PM   Result Value Ref Range    POC Glucose 293 (H) 65 - 140 mg/dl   Fingerstick Glucose (POCT)    Collection Time: 09/10/24  9:07 PM   Result Value Ref Range    POC Glucose 303 (H) 65 - 140 mg/dl   CBC    Collection Time: 09/11/24  4:51 AM   Result Value Ref Range    WBC 6.09 4.31 - 10.16 Thousand/uL    RBC 4.29 3.81 - 5.12 Million/uL    Hemoglobin 12.6 11.5 - 15.4 g/dL    Hematocrit 38.0 34.8 - 46.1 %    MCV 89 82 - 98 fL    MCH 29.4 26.8 - 34.3 pg    MCHC 33.2 31.4 - 37.4 g/dL    RDW 12.0 11.6 - 15.1 %    Platelets 193 149 - 390 Thousands/uL    MPV 8.8 (L) 8.9 - 12.7 fL   Basic metabolic panel    Collection Time: 09/11/24  4:51 AM   Result Value Ref Range    Sodium 138 135 - 147 mmol/L    Potassium 4.4 3.5 - 5.3 mmol/L    Chloride 100 96 - 108 mmol/L    CO2 31 21 - 32 mmol/L    ANION GAP 7 4 - 13 mmol/L    BUN 14 5 - 25 mg/dL    Creatinine 0.63 0.60 - 1.30 mg/dL    Glucose 249 (H) 65 - 140 mg/dL    Calcium 9.5 8.4 - 10.2 mg/dL    eGFR 94  ml/min/1.73sq m   Fingerstick Glucose (POCT)    Collection Time: 09/11/24  7:37 AM   Result Value Ref Range    POC Glucose 251 (H) 65 - 140 mg/dl   Fingerstick Glucose (POCT)    Collection Time: 09/11/24 11:14 AM   Result Value Ref Range    POC Glucose 310 (H) 65 - 140 mg/dl   CBC and differential    Collection Time: 10/17/24  7:59 AM   Result Value Ref Range    Hemoglobin 13.1 11.5 - 14.5 g/dL    HCT 38.0 35.0 - 43.0 %    White Blood Cell Count 5.1 4.0 - 10.0 thou/cmm    Red Blood Cell Count 4.36 3.70 - 4.70 mill/cmm    Platelet Count 187 140 - 350 thou/cmm    SL AMB MPV 7.5 7.5 - 11.3 fL    MCV 87 80 - 100 fL    MCH 30.0 26.0 - 34.0 pg    MCHC 34.4 32.0 - 37.0 g/dL    RDW 13.6 12.0 - 16.0 %    Differential Type AUTO     Neutrophils (Absolute) 2.8 1.8 - 7.8 thou/cmm    Lymphocytes (Absolute) 1.7 1.0 - 3.0 thou/cmm    Monocytes (Absolute) 0.4 0.3 - 1.0 thou/cmm    Eosinophils (Absolute) 0.1 0.0 - 0.5 thou/cmm    Basophils ABS 0.0 0.0 - 0.1 thou/cmm    Neutrophils 56 %    Lymphocytes 34 %    Monocytes 8 %    Eosinophils 1 %    Basophils PCT 1 %   TSH, 3rd generation    Collection Time: 10/17/24  7:59 AM   Result Value Ref Range    TSH 0.85 0.45 - 5.33 uIU/mL   Comprehensive metabolic panel    Collection Time: 10/17/24  7:59 AM   Result Value Ref Range    Glucose, Random 167 (H) 65 - 99 mg/dL    BUN 22 7 - 25 mg/dL    Creatinine 0.57 0.40 - 1.10 mg/dL    Sodium 139 135 - 145 mmol/L    Potassium 4.2 3.5 - 5.2 mmol/L    Chloride 100 100 - 109 mmol/L    CO2 28 21 - 31 mmol/L    Calcium 10.0 8.5 - 10.1 mg/dL    Alkaline Phosphatase 69 35 - 120 U/L    Albumin 3.9 3.5 - 5.7 g/dL    TOTAL BILIRUBIN 0.6 0.2 - 1.0 mg/dL    Protein, Total 6.9 6.3 - 8.3 g/dL    AST 17 <41 U/L    ALT 14 <56 U/L    ANION GAP 11 3 - 11    eGFR 100 >59    Comment (Note)    Lipid Panel with Direct LDL reflex    Collection Time: 10/17/24  7:59 AM   Result Value Ref Range    Cholesterol, Total 151 <200 mg/dL    Triglycerides 103 <150 mg/dL    HDL  Cholesterol 48 23 - 92 mg/dL    Non HDL Chol. (LDL+VLDL) 103 <160 mg/dL    Chol HDLC Ratio 3.1     LDL Calculated 82 <130 mg/dL   Albumin / creatinine urine ratio    Collection Time: 10/17/24  7:59 AM   Result Value Ref Range    Creatinine(Crt),U 54.3 50.0 - 200.0 mg/dL    Albumin,U,Random 0.9 <3.0 mg/dL    Microalb/Creat Ratio 16.6 <30.0 mg/gm CREA   Vitamin B12    Collection Time: 10/17/24  7:59 AM   Result Value Ref Range    Vitamin B-12 393 180 - 914 pg/mL   Hemoglobin A1C    Collection Time: 10/17/24  7:59 AM   Result Value Ref Range    Hemoglobin A1C 9.3 (H) <5.7 %    Estimated Average Glucose 220 mg/dL        Physical Exam  Constitutional:       Appearance: Normal appearance.   HENT:      Head: Normocephalic.      Right Ear: External ear normal.      Left Ear: External ear normal.      Nose: Nose normal. No congestion.      Mouth/Throat:      Mouth: Mucous membranes are moist.      Pharynx: Oropharynx is clear. No oropharyngeal exudate or posterior oropharyngeal erythema.   Eyes:      Extraocular Movements: Extraocular movements intact.      Conjunctiva/sclera: Conjunctivae normal.   Cardiovascular:      Rate and Rhythm: Normal rate and regular rhythm.      Heart sounds: Normal heart sounds. No murmur heard.  Pulmonary:      Effort: Pulmonary effort is normal.      Breath sounds: Normal breath sounds. No wheezing or rales.   Abdominal:      General: Abdomen is flat. There is no distension.      Palpations: Abdomen is soft.      Tenderness: There is no abdominal tenderness.   Musculoskeletal:         General: Normal range of motion.      Cervical back: Normal range of motion and neck supple.      Right lower leg: No edema.      Left lower leg: No edema.   Lymphadenopathy:      Cervical: No cervical adenopathy.   Skin:     General: Skin is warm.   Neurological:      General: No focal deficit present.      Mental Status: She is alert and oriented to person, place, and time.

## 2024-10-24 DIAGNOSIS — E11.65 UNCONTROLLED TYPE 2 DIABETES MELLITUS WITH HYPERGLYCEMIA (HCC): ICD-10-CM

## 2024-10-24 RX ORDER — GLIPIZIDE 5 MG/1
TABLET ORAL
Qty: 90 TABLET | Refills: 1 | Status: SHIPPED | OUTPATIENT
Start: 2024-10-24

## 2024-10-24 NOTE — PROGRESS NOTES
INTERNAL MEDICINE RESIDENCY PROGRESS NOTE     Name: Michelle Rosa   Age & Sex: 64 y.o. female   MRN: [id number]  [facility]   Encounter: [id number]  Team: SOD Team B     PATIENT INFORMATION     Name: Michelle Rosa   Age & Sex: 64 y.o. female   MRN: [id number]  Hospital Stay Days: 2    ASSESSMENT/PLAN     Principal Problem:    Sepsis (HCC)  Active Problems:    Pyelonephritis    Diabetes mellitus (HCC)    Essential hypertension    Mixed hyperlipidemia    B12 deficiency      Pyelonephritis  Assessment & Plan  CT A/P with contrast: Area of hypoattenuation in the midpole of the left kidney may represent pyelonephritis or a lesion. Correlation with urinalysis advised. Follow-up contrast-enhanced CT with renal protocol or MRI with intravenous contrast is advised in 2 months to assess for a lesion.  Patient has a one week history of fever and nausea/vomiting, denies urinary symptoms. Denies dysuria, increased urinary frequency, hematuria. UA positive for bacteria, WBC, and leukocytes. Pt has no recorded history of prior UTI or urine cultures.    Plan:   Ucx growing E. Coli, plan to treat for total 10-day course and transition to cefpodoxime on discharge tomorrow  Cont. Ceftriaxone    * Sepsis (HCC)  Assessment & Plan  64 y.o. female with a PMHx of T2DM with most recent A1C 6.8, essential HTN, B12 deficiency, OA of the knees, mixed HLD presented to the ED with history of nausea and vomiting since Monday. Patient's granddaughters at bedside helping provide history, they reported she had a Tmax of 103. On presentation to the ED Temp 103, tachycardia to 130s, WBC elevated to 11.29, Lactic 2.3. Pt had septic workup in the ED, UA was positive for bacteria, WBC, and leukocytes. Urine culture produced 50,000 CFU of E. Coli.   CT A/P with contrast: concern for pyelonephritis as well as punctate focus of air in the bladder.  Blood culture positive for gram + cocci in clusters. Most likely contaminant as 1/2  Pt is s/p one  dose of zosyn in the ED, 2L of NS, catheter placed in the ED. Catheter removed on inpatient unit, currently s/p 2 dose of ceftriaxone.   Lactic acid 2.3 -> 1.0   CXR clear.     Plan:   Follow up blood cultures  Cont. ceftriaxone 1 g daily  Transition to cefpodoxime outpatient  PRN tylenol 650 mg for fever  PRN zofran 4 mg Q6h for nausea  S/p 3L IVF resuscitation   Hold losartan-HCTZ in the setting of sepsis    B12 deficiency  Assessment & Plan  Patient has known history of B12 deficiency, takes 1,000 mcg daily. Continue home medication    Mixed hyperlipidemia  Assessment & Plan  Patient taking rosuvastatin 10 mg daily.     Plan:   Pravastatin 80 mg daily    Essential hypertension  Assessment & Plan  Home regimen losartan-hydrochlorothiazide 100-25 mg daily   Verapamil 240 mg daily    Plan:   - Holding all hypertensive medications while patient is septic    Diabetes mellitus (HCC)  Assessment & Plan  Lab Results   Component Value Date    HGBA1C 9.7 (H) 09/08/2024       Recent Labs     09/09/24  1723 09/09/24  2121 09/10/24  0728 09/10/24  1050   POCGLU 333* 307* 250* 306*       Blood Sugar Average: Last 72 hrs:  (P) [id number]  Trulicity 3 mg weekly  Glipizide 5 mg daily     Diabetes now poorly controlled with A1c 9.7 on admission and 4 months ago being 6.8.  This could be a contributor to her pyelonephritis.  She will need to be discharged on insulin as well as glipizide being discontinued    Plan:   - Increase to 20 units of Lantus today  -Diabetes education  - SSI, monitor glucose        Disposition: Discharge most likely tomorrow pending glucose control and insulin education in the setting of uncontrolled TIIDM.      SUBJECTIVE     Patient seen and examined. No acute events overnight. Pt has no acute complaints, urinary symptoms, nausea or vomiting. . Had episode of diarrhea last night and this morning.    OBJECTIVE     Vitals:    09/09/24 0806 09/09/24 1553 09/09/24 2247 09/10/24 0730   BP: 126/63  126/64 127/64 130/66   Pulse: 96 89 90 90   Resp: 12 18 20 16   Temp: 99.6 °F (37.6 °C) 98.7 °F (37.1 °C) 99.5 °F (37.5 °C) 98.5 °F (36.9 °C)   TempSrc:       SpO2: 95% 95% 95% 94%   Weight:       Height:          Temperature:   Temp (24hrs), Av.9 °F (37.2 °C), Min:98.5 °F (36.9 °C), Max:99.5 °F (37.5 °C)    Temperature: 98.5 °F (36.9 °C)  Intake & Output:  I/O          0701   0700  0701  09/10 0700 09/10 0701   0700    P.O. 120  236    IV Piggyback 1000      Total Intake(mL/kg) 1120 (13.9)  236 (2.9)    Urine (mL/kg/hr) 1100 1575 (0.8)     Stool 0      Total Output 1100 1575     Net +20 -1575 +236           Unmeasured Stool Occurrence 1 x            Weights:   IBW (Ideal Body Weight): 40.9 kg    Body mass index is 37.2 kg/m².  Weight (last 2 days)       Date/Time Weight    24 1107 80.7 (178)          Physical Exam  Vitals and nursing note reviewed.   Constitutional:       General: She is not in acute distress.     Appearance: She is well-developed. She is not ill-appearing.   HENT:      Head: Normocephalic and atraumatic.   Eyes:      Conjunctiva/sclera: Conjunctivae normal.   Cardiovascular:      Rate and Rhythm: Normal rate and regular rhythm.      Heart sounds: No murmur heard.  Pulmonary:      Effort: Pulmonary effort is normal. No respiratory distress.      Breath sounds: Normal breath sounds.   Abdominal:      General: Abdomen is flat. There is no distension.      Palpations: Abdomen is soft.      Tenderness: There is abdominal tenderness.   Musculoskeletal:         General: No swelling.      Cervical back: Neck supple.   Skin:     General: Skin is warm and dry.      Capillary Refill: Capillary refill takes less than 2 seconds.   Neurological:      General: No focal deficit present.      Mental Status: She is alert.   Psychiatric:         Mood and Affect: Mood normal.       LABORATORY DATA     Labs: I have personally reviewed pertinent reports.  Results from last 7 days   Lab  Units 09/10/24  0551 09/09/24  0621 09/09/24  0619 09/08/24  1212   WBC Thousand/uL 7.30  --  9.63 11.29*   HEMOGLOBIN g/dL 11.8  --  11.7 13.9   HEMATOCRIT % 36.3  --  34.9 41.4   PLATELETS Thousands/uL 164 150 145* 182   SEGS PCT %  --   --  77* 82*   MONO PCT %  --   --  10 5   EOS PCT %  --   --  0 0      Results from last 7 days   Lab Units 09/10/24  0551 09/09/24  0619 09/08/24  1212   POTASSIUM mmol/L 4.0 3.3* 3.4*   CHLORIDE mmol/L 101 100 98   CO2 mmol/L 29 28 29   BUN mg/dL 11 9 11   CREATININE mg/dL 0.58* 0.68 0.76   CALCIUM mg/dL 8.9 8.4 9.7   ALK PHOS U/L  --   --  79   ALT U/L  --   --  20   AST U/L  --   --  16              Results from last 7 days   Lab Units 09/08/24  1212   INR  1.04   PTT seconds 25     Results from last 7 days   Lab Units 09/08/24  1548   LACTIC ACID mmol/L 1.0           IMAGING & DIAGNOSTIC TESTING     Radiology Results: I have personally reviewed pertinent reports.  XR chest pa & lateral    Result Date: 9/9/2024  Impression: No acute cardiopulmonary disease. Workstation performed: [id number]     CT abdomen pelvis with contrast    Result Date: 9/8/2024  Impression: 1.  Area of hypoattenuation in the midpole of the left kidney may represent pyelonephritis or a lesion. Correlation with urinalysis advised. Follow-up contrast-enhanced CT with renal protocol or MRI with intravenous contrast is advised in 2 months to assess for a lesion. 2.  Punctate focus of air is considered abnormal in the absence of recent catheterization, and can represent infection. Correlation with urinalysis is advised. 3.  No acute gastroenterologic pathology. The study was marked in EPIC for immediate notification. Workstation performed: [id number]     Other Diagnostic Testing: I have personally reviewed pertinent reports.    ACTIVE MEDICATIONS       Current Facility-Administered Medications:     acetaminophen (TYLENOL) tablet 650 mg, Q6H PRN    cefTRIAXone (ROCEPHIN) 1,000 mg in dextrose 5 % 50 mL IVPB,  "Q24H    cyanocobalamin (VITAMIN B-12) tablet 1,000 mcg, Daily    enoxaparin (LOVENOX) subcutaneous injection 40 mg, Daily    insulin glargine (LANTUS) subcutaneous injection 20 Units 0.2 mL, HS    insulin lispro (HumALOG/ADMELOG) 100 units/mL subcutaneous injection 1-6 Units, 4x Daily (AC & HS) **AND** Fingerstick Glucose (POCT), 4x Daily AC and at bedtime    ondansetron (ZOFRAN) injection 4 mg, Q6H PRN    pravastatin (PRAVACHOL) tablet 80 mg, Daily With Dinner    VTE Pharmacologic Prophylaxis: Sequential compression device (Venodyne)  and Enoxaparin (Lovenox)  VTE Mechanical Prophylaxis: sequential compression device    Portions of the record may have been created with voice recognition software.  Occasional wrong word or \"sound a like\" substitutions may have occurred due to the inherent limitations of voice recognition software.  Read the chart carefully and recognize, using context, where substitutions have occurred.  ==  Baylee Spicer DO  [facility]  Internal medicine resident PGY 2       " Detail Level: Detailed Topical Sulfur Applications Pregnancy And Lactation Text: This medication is Pregnancy Category C and has an unknown safety profile during pregnancy. It is unknown if this topical medication is excreted in breast milk. Benzoyl Peroxide Pregnancy And Lactation Text: This medication is Pregnancy Category C. It is unknown if benzoyl peroxide is excreted in breast milk. Topical Retinoid Pregnancy And Lactation Text: This medication is Pregnancy Category C. It is unknown if this medication is excreted in breast milk. Tetracycline Pregnancy And Lactation Text: This medication is Pregnancy Category D and not consider safe during pregnancy. It is also excreted in breast milk. Winlevi Pregnancy And Lactation Text: This medication is considered safe during pregnancy and breastfeeding. Tazorac Pregnancy And Lactation Text: This medication is not safe during pregnancy. It is unknown if this medication is excreted in breast milk. Include Pregnancy/Lactation Warning?: No Azelaic Acid Pregnancy And Lactation Text: This medication is considered safe during pregnancy and breast feeding. Topical Clindamycin Pregnancy And Lactation Text: This medication is Pregnancy Category B and is considered safe during pregnancy. It is unknown if it is excreted in breast milk. Doxycycline Pregnancy And Lactation Text: This medication is Pregnancy Category D and not consider safe during pregnancy. It is also excreted in breast milk but is considered safe for shorter treatment courses. High Dose Vitamin A Pregnancy And Lactation Text: High dose vitamin A therapy is contraindicated during pregnancy and breast feeding. Aklief Pregnancy And Lactation Text: It is unknown if this medication is safe to use during pregnancy.  It is unknown if this medication is excreted in breast milk.  Breastfeeding women should use the topical cream on the smallest area of the skin for the shortest time needed while breastfeeding.  Do not apply to nipple and areola. Isotretinoin Pregnancy And Lactation Text: This medication is Pregnancy Category X and is considered extremely dangerous during pregnancy. It is unknown if it is excreted in breast milk. Spironolactone Pregnancy And Lactation Text: This medication can cause feminization of the male fetus and should be avoided during pregnancy. The active metabolite is also found in breast milk. Azithromycin Pregnancy And Lactation Text: This medication is considered safe during pregnancy and is also secreted in breast milk. Erythromycin Pregnancy And Lactation Text: This medication is Pregnancy Category B and is considered safe during pregnancy. It is also excreted in breast milk. Dapsone Pregnancy And Lactation Text: This medication is Pregnancy Category C and is not considered safe during pregnancy or breast feeding. Birth Control Pills Pregnancy And Lactation Text: This medication should be avoided if pregnant and for the first 30 days post-partum. Bactrim Pregnancy And Lactation Text: This medication is Pregnancy Category D and is known to cause fetal risk.  It is also excreted in breast milk. Winlevi Counseling:  I discussed with the patient the risks of topical clascoterone including but not limited to erythema, scaling, itching, and stinging. Patient voiced their understanding. Topical Sulfur Applications Counseling: Topical Sulfur Counseling: Patient counseled that this medication may cause skin irritation or allergic reactions.  In the event of skin irritation, the patient was advised to reduce the amount of the drug applied or use it less frequently.   The patient verbalized understanding of the proper use and possible adverse effects of topical sulfur application.  All of the patient's questions and concerns were addressed. Benzoyl Peroxide Counseling: Patient counseled that medicine may cause skin irritation and bleach clothing.  In the event of skin irritation, the patient was advised to reduce the amount of the drug applied or use it less frequently.   The patient verbalized understanding of the proper use and possible adverse effects of benzoyl peroxide.  All of the patient's questions and concerns were addressed. Topical Retinoid counseling:  Patient advised to apply a pea-sized amount only at bedtime and wait 30 minutes after washing their face before applying.  If too drying, patient may add a non-comedogenic moisturizer. The patient verbalized understanding of the proper use and possible adverse effects of retinoids.  All of the patient's questions and concerns were addressed. Tetracycline Counseling: Patient counseled regarding possible photosensitivity and increased risk for sunburn.  Patient instructed to avoid sunlight, if possible.  When exposed to sunlight, patients should wear protective clothing, sunglasses, and sunscreen.  The patient was instructed to call the office immediately if the following severe adverse effects occur:  hearing changes, easy bruising/bleeding, severe headache, or vision changes.  The patient verbalized understanding of the proper use and possible adverse effects of tetracycline.  All of the patient's questions and concerns were addressed. Patient understands to avoid pregnancy while on therapy due to potential birth defects. Aklief counseling:  Patient advised to apply a pea-sized amount only at bedtime and wait 30 minutes after washing their face before applying.  If too drying, patient may add a non-comedogenic moisturizer.  The most commonly reported side effects including irritation, redness, scaling, dryness, stinging, burning, itching, and increased risk of sunburn.  The patient verbalized understanding of the proper use and possible adverse effects of retinoids.  All of the patient's questions and concerns were addressed. Tazorac Counseling:  Patient advised that medication is irritating and drying.  Patient may need to apply sparingly and wash off after an hour before eventually leaving it on overnight.  The patient verbalized understanding of the proper use and possible adverse effects of tazorac.  All of the patient's questions and concerns were addressed. Topical Clindamycin Counseling: Patient counseled that this medication may cause skin irritation or allergic reactions.  In the event of skin irritation, the patient was advised to reduce the amount of the drug applied or use it less frequently.   The patient verbalized understanding of the proper use and possible adverse effects of clindamycin.  All of the patient's questions and concerns were addressed. High Dose Vitamin A Counseling: Side effects reviewed, pt to contact office should one occur. Erythromycin Counseling:  I discussed with the patient the risks of erythromycin including but not limited to GI upset, allergic reaction, drug rash, diarrhea, increase in liver enzymes, and yeast infections. Doxycycline Counseling:  Patient counseled regarding possible photosensitivity and increased risk for sunburn.  Patient instructed to avoid sunlight, if possible.  When exposed to sunlight, patients should wear protective clothing, sunglasses, and sunscreen.  The patient was instructed to call the office immediately if the following severe adverse effects occur:  hearing changes, easy bruising/bleeding, severe headache, or vision changes.  The patient verbalized understanding of the proper use and possible adverse effects of doxycycline.  All of the patient's questions and concerns were addressed. Minocycline Counseling: Patient advised regarding possible photosensitivity and discoloration of the teeth, skin, lips, tongue and gums.  Patient instructed to avoid sunlight, if possible.  When exposed to sunlight, patients should wear protective clothing, sunglasses, and sunscreen.  The patient was instructed to call the office immediately if the following severe adverse effects occur:  hearing changes, easy bruising/bleeding, severe headache, or vision changes.  The patient verbalized understanding of the proper use and possible adverse effects of minocycline.  All of the patient's questions and concerns were addressed. Sarecycline Counseling: Patient advised regarding possible photosensitivity and discoloration of the teeth, skin, lips, tongue and gums.  Patient instructed to avoid sunlight, if possible.  When exposed to sunlight, patients should wear protective clothing, sunglasses, and sunscreen.  The patient was instructed to call the office immediately if the following severe adverse effects occur:  hearing changes, easy bruising/bleeding, severe headache, or vision changes.  The patient verbalized understanding of the proper use and possible adverse effects of sarecycline.  All of the patient's questions and concerns were addressed. Azelaic Acid Counseling: Patient counseled that medicine may cause skin irritation and to avoid applying near the eyes.  In the event of skin irritation, the patient was advised to reduce the amount of the drug applied or use it less frequently.   The patient verbalized understanding of the proper use and possible adverse effects of azelaic acid.  All of the patient's questions and concerns were addressed. Spironolactone Counseling: Patient advised regarding risks of diarrhea, abdominal pain, hyperkalemia, birth defects (for female patients), liver toxicity and renal toxicity. The patient may need blood work to monitor liver and kidney function and potassium levels while on therapy. The patient verbalized understanding of the proper use and possible adverse effects of spironolactone.  All of the patient's questions and concerns were addressed. Bactrim Counseling:  I discussed with the patient the risks of sulfa antibiotics including but not limited to GI upset, allergic reaction, drug rash, diarrhea, dizziness, photosensitivity, and yeast infections.  Rarely, more serious reactions can occur including but not limited to aplastic anemia, agranulocytosis, methemoglobinemia, blood dyscrasias, liver or kidney failure, lung infiltrates or desquamative/blistering drug rashes. Azithromycin Counseling:  I discussed with the patient the risks of azithromycin including but not limited to GI upset, allergic reaction, drug rash, diarrhea, and yeast infections. Dapsone Counseling: I discussed with the patient the risks of dapsone including but not limited to hemolytic anemia, agranulocytosis, rashes, methemoglobinemia, kidney failure, peripheral neuropathy, headaches, GI upset, and liver toxicity.  Patients who start dapsone require monitoring including baseline LFTs and weekly CBCs for the first month, then every month thereafter.  The patient verbalized understanding of the proper use and possible adverse effects of dapsone.  All of the patient's questions and concerns were addressed. Isotretinoin Counseling: Patient should get monthly blood tests, not donate blood, not drive at night if vision affected, not share medication, and not undergo elective surgery for 6 months after tx completed. Side effects reviewed, pt to contact office should one occur. Birth Control Pills Counseling: Birth Control Pill Counseling: I discussed with the patient the potential side effects of OCPs including but not limited to increased risk of stroke, heart attack, thrombophlebitis, deep venous thrombosis, hepatic adenomas, breast changes, GI upset, headaches, and depression.  The patient verbalized understanding of the proper use and possible adverse effects of OCPs. All of the patient's questions and concerns were addressed.

## 2024-10-25 NOTE — PROGRESS NOTES
Assessment/Plan:      Depression Screening and Follow-up Plan: Patient was screened for depression during today's encounter. They screened negative with a PHQ-2 score of 0.            1. Uncontrolled type 2 diabetes mellitus with hyperglycemia (HCC)  -     CBC and differential; Future  -     Comprehensive metabolic panel; Future  -     Hemoglobin A1C; Future  -     Lipid Panel with Direct LDL reflex; Future  -     Albumin / creatinine urine ratio  -     TSH, 3rd generation; Future    2. Varicose veins of both lower extremities with pain    3. Essential hypertension, benign    4. Primary osteoarthritis of both knees    5. Mixed hyperlipidemia    6. Rotator cuff tendonitis, left           Subjective:      Patient ID: Stacie Pham is a 58 y.o. female. Follow-up on multiple medical problems to ensure they are stable on current medications      The following portions of the patient's history were reviewed and updated as appropriate: She  has a past medical history of Diabetes mellitus ([address]), Hypercholesterolemia, Hypertension, Rheumatoid arthritis ([address]), and Sarcoidosis.   She   Patient Active Problem List    Diagnosis Date Noted   • Supraclavicular mass 05/10/2023   • Neck swelling 05/10/2023   • Primary osteoarthritis of left knee 11/04/2022   • Status post left knee replacement 11/04/2022   • Rotator cuff tendonitis, left 04/08/2022   • Nausea 05/18/2021   • COVID-19 virus detected 05/18/2021   • Mixed hyperlipidemia 05/18/2021   • Essential hypertension, benign 04/21/2021   • Primary osteoarthritis of both knees 04/21/2021   • Varicose veins of both lower extremities with pain 03/30/2021   • Body mass index 39.0-39.9, adult 03/30/2021   • Uncontrolled type 2 diabetes mellitus with hyperglycemia ([address]) 03/30/2021   • Encounter for screening mammogram for malignant neoplasm of breast 03/30/2021   • Encounter for screening colonoscopy 03/30/2021   • Moderate persistent asthma without complication 11/96/3839   • Exposure to SARS-associated coronavirus 09/10/2020   • Allergic rhinitis 03/18/2020   • Asthma 03/18/2020   • Hyperlipidemia 03/18/2020   • Sarcoidosis 03/18/2020   • Thalassemia 03/18/2020   • Vitamin D deficiency 03/18/2020   • Diabetes mellitus ([address]) 03/14/2019   • Essential hypertension 03/14/2019     She  has a past surgical history that includes Colonoscopy (05/15/2009); Cholecystectomy; Mammo (historical) (09/30/2016); and Knee surgery (Left, 10/2022). Her family history includes Coronary artery disease in her father; Diabetes in her maternal grandmother and mother; No Known Problems in her daughter, daughter, daughter, maternal aunt, maternal aunt, maternal aunt, paternal aunt, paternal aunt, paternal grandmother, sister, sister, sister, son, son, and son; Stomach cancer in her maternal grandfather. She  reports that she has never smoked. She has never used smokeless tobacco. She reports that she does not currently use alcohol. She reports that she does not use drugs.   Current Outpatient Medications   Medication Sig Dispense Refill   • dulaglutide (Trulicity) 3 RK/9.2JP injection Inject 0.5 mL (3 mg total) under the skin once a week 6 mL 1   • Insulin Pen Needle 29G X 10MM MISC Use once a week 13 each 1   • Janumet  MG per tablet Take 1 tablet by mouth 2 (two) times a day with meals 180 tablet 1   • losartan-hydrochlorothiazide (HYZAAR) 100-25 MG per tablet Take 1 tablet by mouth daily 90 tablet 1   • rosuvastatin (CRESTOR) 10 MG tablet Take 1 tablet (10 mg total) by mouth daily 90 tablet 1   • verapamil (VERELAN) 240 MG 24 hr capsule Take 1 capsule (240 mg total) by mouth daily at bedtime 90 capsule 1   • meloxicam (Mobic) 15 mg tablet Take 1 tablet (15 mg total) by mouth daily (Patient not taking: Reported on 10/21/2022) 30 tablet 0   • ondansetron (ZOFRAN) 4 mg tablet take 1 tablet by mouth once daily if needed for nausea and vomiting (Patient not taking: Reported on 3/22/2023)     • UltiCare Mini Pen Needles 31G X 6 MM MISC  (Patient not taking: Reported on 10/5/2022)       No current facility-administered medications for this visit. Current Outpatient Medications on File Prior to Visit   Medication Sig   • dulaglutide (Trulicity) 3 TG/7.8EE injection Inject 0.5 mL (3 mg total) under the skin once a week   • Insulin Pen Needle 29G X 10MM MISC Use once a week   • Janumet  MG per tablet Take 1 tablet by mouth 2 (two) times a day with meals   • losartan-hydrochlorothiazide (HYZAAR) 100-25 MG per tablet Take 1 tablet by mouth daily   • rosuvastatin (CRESTOR) 10 MG tablet Take 1 tablet (10 mg total) by mouth daily   • verapamil (VERELAN) 240 MG 24 hr capsule Take 1 capsule (240 mg total) by mouth daily at bedtime   • meloxicam (Mobic) 15 mg tablet Take 1 tablet (15 mg total) by mouth daily (Patient not taking: Reported on 10/21/2022)   • ondansetron (ZOFRAN) 4 mg tablet take 1 tablet by mouth once daily if needed for nausea and vomiting (Patient not taking: Reported on 3/22/2023)   • UltiCare Mini Pen Needles 31G X 6 MM MISC  (Patient not taking: Reported on 10/5/2022)     No current facility-administered medications on file prior to visit. She is allergic to sulfa antibiotics. .    Review of Systems   Constitutional: Negative for chills and fever. HENT: Negative for congestion, ear pain and sore throat. Eyes: Negative for pain. Respiratory: Negative for cough and shortness of breath. Cardiovascular: Negative for chest pain and leg swelling. Gastrointestinal: Negative for abdominal pain, nausea and vomiting. Endocrine: Negative for polyuria. Genitourinary: Negative for difficulty urinating, frequency and urgency. Musculoskeletal: Positive for arthralgias. Negative for back pain. Skin: Negative for rash. Neurological: Negative for weakness and headaches. Psychiatric/Behavioral: Negative for sleep disturbance. The patient is not nervous/anxious.           Objective:      /82 (BP Location: Left arm, Patient Position: Sitting, Cuff Size: Standard)   Pulse 89   Temp 97.5 °F (36.4 °C) (Temporal)   Ht 4' 10" (1.473 m)   Wt 77.1 kg (170 lb)   SpO2 97%   BMI 35.53 kg/m²     No results found for this or any previous visit (from the past 1344 hour(s)). Physical Exam  Constitutional:       Appearance: Normal appearance. HENT:      Head: Normocephalic. Right Ear: External ear normal.      Left Ear: External ear normal.      Nose: Nose normal. No congestion. Mouth/Throat:      Mouth: Mucous membranes are moist.      Pharynx: Oropharynx is clear. No oropharyngeal exudate or posterior oropharyngeal erythema. Eyes:      Extraocular Movements: Extraocular movements intact. Conjunctiva/sclera: Conjunctivae normal.   Cardiovascular:      Rate and Rhythm: Normal rate and regular rhythm. Heart sounds: Normal heart sounds. No murmur heard. Pulmonary:      Effort: Pulmonary effort is normal.      Breath sounds: Normal breath sounds. No wheezing or rales. Abdominal:      General: Bowel sounds are normal. There is no distension. Palpations: Abdomen is soft. Tenderness: There is no abdominal tenderness. Musculoskeletal:         General: Normal range of motion. Cervical back: Normal range of motion and neck supple. Right lower leg: No edema. Left lower leg: No edema. Lymphadenopathy:      Cervical: No cervical adenopathy. Skin:     General: Skin is warm. Neurological:      General: No focal deficit present. Mental Status: She is alert and oriented to person, place, and time. Diabetes Education and Nutrition Eval  While inpatient recommend:  Start Lantus 20 units qhs  Start Admelog 6 units qac  Low correction scale qac + bedtime  Goal glucose 100-180  Outpt. endo follow-up. Pt. will make an appointment with her 's endocrinologist.   Outpt. optho, podiatry, micro/cr  Plan to d/c on metformin 500mg BID to be increased to 1000mg BID as outpatient as tolerated and Ozempic 0.25mg weekly for additional CV and weight loss benefits with increase as tolerated as outpatient.

## 2024-11-08 ENCOUNTER — HOSPITAL ENCOUNTER (OUTPATIENT)
Dept: RADIOLOGY | Facility: HOSPITAL | Age: 64
Discharge: HOME | End: 2024-11-08
Attending: INTERNAL MEDICINE
Payer: COMMERCIAL

## 2024-11-08 DIAGNOSIS — N28.9 KIDNEY LESION, NATIVE, LEFT: ICD-10-CM

## 2024-11-08 DIAGNOSIS — N12 PYELONEPHRITIS: ICD-10-CM

## 2024-11-08 PROCEDURE — 74178 CT ABD&PLV WO CNTR FLWD CNTR: CPT

## 2024-11-08 RX ADMIN — IOHEXOL 100 ML: 350 INJECTION, SOLUTION INTRAVENOUS at 12:38

## 2024-11-27 DIAGNOSIS — E11.65 UNCONTROLLED TYPE 2 DIABETES MELLITUS WITH HYPERGLYCEMIA (HCC): ICD-10-CM

## 2024-11-27 RX ORDER — INSULIN GLARGINE 100 [IU]/ML
25 INJECTION, SOLUTION SUBCUTANEOUS
Qty: 15 ML | Refills: 2 | Status: SHIPPED | OUTPATIENT
Start: 2024-11-27

## 2024-11-27 NOTE — TELEPHONE ENCOUNTER
Reason for call:   [x] Refill   [] Prior Auth  [] Other:     Office:   [x] PCP/Provider -   [] Specialty/Provider -     Medication: Insulin Glargine Solostar (Basaglar KwikPen) 100 UNIT/ML     Dose/Frequency:  Inject 0.25 mL (25 Units total) under the skin daily at bedtime     Quantity: 15 mL    Pharmacy: [id number] - [place], [place] - [id number]     Does the patient have enough for 3 days?   [] Yes   [x] No - Send as HP to POD

## 2024-12-02 ENCOUNTER — OFFICE VISIT (OUTPATIENT)
Dept: INTERNAL MEDICINE CLINIC | Facility: CLINIC | Age: 64
End: 2024-12-02
Payer: COMMERCIAL

## 2024-12-02 VITALS
TEMPERATURE: 97.2 F | SYSTOLIC BLOOD PRESSURE: 134 MMHG | HEIGHT: 58 IN | DIASTOLIC BLOOD PRESSURE: 82 MMHG | BODY MASS INDEX: 36.31 KG/M2 | OXYGEN SATURATION: 98 % | HEART RATE: 90 BPM | WEIGHT: 173 LBS

## 2024-12-02 DIAGNOSIS — E11.65 UNCONTROLLED TYPE 2 DIABETES MELLITUS WITH HYPERGLYCEMIA (HCC): Primary | ICD-10-CM

## 2024-12-02 DIAGNOSIS — J45.40 MODERATE PERSISTENT ASTHMA WITHOUT COMPLICATION: ICD-10-CM

## 2024-12-02 DIAGNOSIS — I83.813 VARICOSE VEINS OF BOTH LOWER EXTREMITIES WITH PAIN: ICD-10-CM

## 2024-12-02 DIAGNOSIS — M12.812 ROTATOR CUFF ARTHROPATHY OF LEFT SHOULDER: ICD-10-CM

## 2024-12-02 DIAGNOSIS — E78.2 MIXED HYPERLIPIDEMIA: ICD-10-CM

## 2024-12-02 DIAGNOSIS — M17.0 PRIMARY OSTEOARTHRITIS OF BOTH KNEES: ICD-10-CM

## 2024-12-02 DIAGNOSIS — I10 ESSENTIAL HYPERTENSION, BENIGN: ICD-10-CM

## 2024-12-02 PROCEDURE — 99214 OFFICE O/P EST MOD 30 MIN: CPT | Performed by: INTERNAL MEDICINE

## 2024-12-02 NOTE — PROGRESS NOTES
Assessment/Plan:             1. Uncontrolled type 2 diabetes mellitus with hyperglycemia (HCC)  Comments:  start Mounjaro, keep checking sugar at home  Orders:  -     Tirzepatide 5 MG/0.5ML SOAJ; Inject 5 mg under the skin once a week  -     Ambulatory Referral to Diabetic Education; Future  2. Essential hypertension, benign  Comments:  continue same med  3. Varicose veins of both lower extremities with pain  4. Moderate persistent asthma without complication  5. Rotator cuff arthropathy of left shoulder  6. Primary osteoarthritis of both knees  7. Mixed hyperlipidemia  Comments:  continue same med         Subjective:      Patient ID: Michelle Rosa is a 64 y.o. female.    Follow-up on multiple medical problems to ensure they are stable on current medications        The following portions of the patient's history were reviewed and updated as appropriate: She  has a past medical history of Diabetes mellitus (HCC), Hypercholesterolemia, Hypertension, Rheumatoid arthritis (HCC), Sarcoidosis, and Sepsis (HCC) (09/08/2024).  She   Patient Active Problem List    Diagnosis Date Noted    Nontraumatic tear of left supraspinatus tendon 08/30/2024    Rotator cuff arthropathy of left shoulder 07/29/2024    B12 deficiency 04/29/2024    Supraclavicular mass 05/10/2023    Neck swelling 05/10/2023    Primary osteoarthritis of left knee 11/04/2022    Status post left knee replacement 11/04/2022    Rotator cuff tendonitis, left 04/08/2022    Nausea 05/18/2021    Mixed hyperlipidemia 05/18/2021    History of COVID-19 05/18/2021    Essential hypertension, benign 04/21/2021    Primary osteoarthritis of both knees 04/21/2021    Varicose veins of both lower extremities with pain 03/30/2021    Uncontrolled type 2 diabetes mellitus with hyperglycemia (HCC) 03/30/2021    Moderate persistent asthma without complication 12/23/2020    Allergic rhinitis 03/18/2020    Asthma 03/18/2020    Sarcoidosis 03/18/2020    Thalassemia 03/18/2020    Vitamin D  deficiency 03/18/2020    Diabetes mellitus (HCC) 03/14/2019    Essential hypertension 03/14/2019     She  has a past surgical history that includes Colonoscopy (05/15/2009); Cholecystectomy; Mammo (historical) (09/30/2016); and Knee surgery (Left, 10/2022).  Her family history includes Coronary artery disease in her father; Diabetes in her maternal grandmother and mother; No Known Problems in her daughter, daughter, daughter, maternal aunt, maternal aunt, maternal aunt, paternal aunt, paternal aunt, paternal grandmother, sister, sister, sister, son, son, and son; Stomach cancer in her maternal grandfather.  She  reports that she has never smoked. She has never used smokeless tobacco. She reports that she does not currently use alcohol. She reports that she does not use drugs.  Current Outpatient Medications   Medication Sig Dispense Refill    Alcohol Swabs 70 % PADS May substitute brand based on insurance coverage. Check glucose TID. 100 each 0    Blood Glucose Monitoring Suppl (OneTouch Verio Reflect) w/Device KIT May substitute brand based on insurance coverage. Check glucose TID. 1 kit 0    glipiZIDE (GLUCOTROL) 5 mg tablet take 1 tablet by mouth with breakfast 90 tablet 1    Insulin Glargine Solostar (Basaglar KwikPen) 100 UNIT/ML SOPN Inject 0.25 mL (25 Units total) under the skin daily at bedtime 15 mL 2    Insulin Pen Needle (BD Pen Needle Dayna 2nd Gen) 32G X 4 MM MISC For use with insulin pen. Pharmacy may dispense brand covered by insurance. 100 each 0    Insulin Pen Needle 29G X 10MM MISC Use once a week 13 each 1    losartan-hydrochlorothiazide (HYZAAR) 100-25 MG per tablet Take 1 tablet by mouth daily 90 tablet 1    rosuvastatin (CRESTOR) 10 MG tablet Take 1 tablet (10 mg total) by mouth daily 90 tablet 1    Tirzepatide 5 MG/0.5ML SOAJ Inject 5 mg under the skin once a week 2 mL 3    vitamin B-12 (VITAMIN B-12) 1,000 mcg tablet Take 1 tablet (1,000 mcg total) by mouth daily 90 tablet 1    Continuous  Glucose Sensor (FreeStyle Jaquelin 3 Sensor) MISC Use 1 each every 14 (fourteen) days (Patient not taking: Reported on 12/2/2024) 6 each 3    glucose blood (OneTouch Verio) test strip May substitute brand based on insurance coverage. Check glucose TID. (Patient not taking: Reported on 12/2/2024) 100 each 0    OneTouch Delica Lancets 33G MISC May substitute brand based on insurance coverage. Check glucose TID. 100 each 0    UltiCare Mini Pen Needles 31G X 6 MM MISC  (Patient not taking: Reported on 9/16/2024)      verapamil (Verelan) 240 MG 24 hr capsule Take 1 capsule (240 mg total) by mouth daily at bedtime (Patient not taking: Reported on 12/2/2024) 90 capsule 1     No current facility-administered medications for this visit.     Current Outpatient Medications on File Prior to Visit   Medication Sig    Alcohol Swabs 70 % PADS May substitute brand based on insurance coverage. Check glucose TID.    Blood Glucose Monitoring Suppl (OneTouch Verio Reflect) w/Device KIT May substitute brand based on insurance coverage. Check glucose TID.    glipiZIDE (GLUCOTROL) 5 mg tablet take 1 tablet by mouth with breakfast    Insulin Glargine Solostar (Basaglar KwikPen) 100 UNIT/ML SOPN Inject 0.25 mL (25 Units total) under the skin daily at bedtime    Insulin Pen Needle (BD Pen Needle Dayna 2nd Gen) 32G X 4 MM MISC For use with insulin pen. Pharmacy may dispense brand covered by insurance.    Insulin Pen Needle 29G X 10MM MISC Use once a week    losartan-hydrochlorothiazide (HYZAAR) 100-25 MG per tablet Take 1 tablet by mouth daily    rosuvastatin (CRESTOR) 10 MG tablet Take 1 tablet (10 mg total) by mouth daily    vitamin B-12 (VITAMIN B-12) 1,000 mcg tablet Take 1 tablet (1,000 mcg total) by mouth daily    Continuous Glucose Sensor (FreeStyle Jaquelin 3 Sensor) MISC Use 1 each every 14 (fourteen) days (Patient not taking: Reported on 12/2/2024)    glucose blood (OneTouch Verio) test strip May substitute brand based on insurance  "coverage. Check glucose TID. (Patient not taking: Reported on 12/2/2024)    OneTouch Delica Lancets 33G MISC May substitute brand based on insurance coverage. Check glucose TID.    UltiCare Mini Pen Needles 31G X 6 MM MISC  (Patient not taking: Reported on 9/16/2024)    verapamil (Verelan) 240 MG 24 hr capsule Take 1 capsule (240 mg total) by mouth daily at bedtime (Patient not taking: Reported on 12/2/2024)    [DISCONTINUED] tirzepatide (Mounjaro) 5 MG/0.5ML Inject 0.5 mL (5 mg total) under the skin every 7 days (Patient not taking: Reported on 12/2/2024)     No current facility-administered medications on file prior to visit.     She is allergic to metformin and sulfa antibiotics..    Review of Systems   Constitutional:  Negative for chills and fever.   HENT:  Negative for congestion, ear pain and sore throat.    Eyes:  Negative for pain.   Respiratory:  Negative for cough and shortness of breath.    Cardiovascular:  Negative for chest pain and leg swelling.   Gastrointestinal:  Negative for abdominal pain, nausea and vomiting.   Endocrine: Negative for polyuria.   Genitourinary:  Negative for difficulty urinating, frequency and urgency.   Musculoskeletal:  Positive for arthralgias. Negative for back pain.   Skin:  Negative for rash.   Neurological:  Negative for weakness and headaches.   Psychiatric/Behavioral:  Negative for sleep disturbance. The patient is not nervous/anxious.          Objective:      /82 (BP Location: Left arm, Patient Position: Sitting, Cuff Size: Standard)   Pulse 90   Temp (!) 97.2 °F (36.2 °C) (Temporal)   Ht 4' 10\" (1.473 m)   Wt 78.5 kg (173 lb)   SpO2 98%   BMI 36.16 kg/m²     Recent Results (from the past 8 weeks)   CBC and differential    Collection Time: 10/17/24  7:59 AM   Result Value Ref Range    Hemoglobin 13.1 11.5 - 14.5 g/dL    HCT 38.0 35.0 - 43.0 %    White Blood Cell Count 5.1 4.0 - 10.0 thou/cmm    Red Blood Cell Count 4.36 3.70 - 4.70 mill/cmm    Platelet " Count 187 140 - 350 thou/cmm    SL AMB MPV 7.5 7.5 - 11.3 fL    MCV 87 80 - 100 fL    MCH 30.0 26.0 - 34.0 pg    MCHC 34.4 32.0 - 37.0 g/dL    RDW 13.6 12.0 - 16.0 %    Differential Type AUTO     Neutrophils (Absolute) 2.8 1.8 - 7.8 thou/cmm    Lymphocytes (Absolute) 1.7 1.0 - 3.0 thou/cmm    Monocytes (Absolute) 0.4 0.3 - 1.0 thou/cmm    Eosinophils (Absolute) 0.1 0.0 - 0.5 thou/cmm    Basophils ABS 0.0 0.0 - 0.1 thou/cmm    Neutrophils 56 %    Lymphocytes 34 %    Monocytes 8 %    Eosinophils 1 %    Basophils PCT 1 %   TSH, 3rd generation    Collection Time: 10/17/24  7:59 AM   Result Value Ref Range    TSH 0.85 0.45 - 5.33 uIU/mL   Comprehensive metabolic panel    Collection Time: 10/17/24  7:59 AM   Result Value Ref Range    Glucose, Random 167 (H) 65 - 99 mg/dL    BUN 22 7 - 25 mg/dL    Creatinine 0.57 0.40 - 1.10 mg/dL    Sodium 139 135 - 145 mmol/L    Potassium 4.2 3.5 - 5.2 mmol/L    Chloride 100 100 - 109 mmol/L    CO2 28 21 - 31 mmol/L    Calcium 10.0 8.5 - 10.1 mg/dL    Alkaline Phosphatase 69 35 - 120 U/L    Albumin 3.9 3.5 - 5.7 g/dL    TOTAL BILIRUBIN 0.6 0.2 - 1.0 mg/dL    Protein, Total 6.9 6.3 - 8.3 g/dL    AST 17 <41 U/L    ALT 14 <56 U/L    ANION GAP 11 3 - 11    eGFR 100 >59    Comment (Note)    Lipid Panel with Direct LDL reflex    Collection Time: 10/17/24  7:59 AM   Result Value Ref Range    Cholesterol, Total 151 <200 mg/dL    Triglycerides 103 <150 mg/dL    HDL Cholesterol 48 23 - 92 mg/dL    Non HDL Chol. (LDL+VLDL) 103 <160 mg/dL    Chol HDLC Ratio 3.1     LDL Calculated 82 <130 mg/dL   Albumin / creatinine urine ratio    Collection Time: 10/17/24  7:59 AM   Result Value Ref Range    Creatinine(Crt),U 54.3 50.0 - 200.0 mg/dL    Albumin,U,Random 0.9 <3.0 mg/dL    Microalb/Creat Ratio 16.6 <30.0 mg/gm CREA   Vitamin B12    Collection Time: 10/17/24  7:59 AM   Result Value Ref Range    Vitamin B-12 393 180 - 914 pg/mL   Hemoglobin A1C    Collection Time: 10/17/24  7:59 AM   Result Value Ref  Range    Hemoglobin A1C 9.3 (H) <5.7 %    Estimated Average Glucose 220 mg/dL        Physical Exam  Constitutional:       Appearance: Normal appearance.   HENT:      Head: Normocephalic.      Right Ear: External ear normal.      Left Ear: External ear normal.      Nose: Nose normal. No congestion.      Mouth/Throat:      Mouth: Mucous membranes are moist.      Pharynx: Oropharynx is clear. No oropharyngeal exudate or posterior oropharyngeal erythema.   Eyes:      Extraocular Movements: Extraocular movements intact.      Conjunctiva/sclera: Conjunctivae normal.   Cardiovascular:      Rate and Rhythm: Normal rate and regular rhythm.      Heart sounds: Normal heart sounds. No murmur heard.  Pulmonary:      Effort: Pulmonary effort is normal.      Breath sounds: Normal breath sounds. No wheezing or rales.   Abdominal:      General: Abdomen is flat. There is no distension.      Palpations: Abdomen is soft.      Tenderness: There is no abdominal tenderness.   Musculoskeletal:      Cervical back: Normal range of motion and neck supple.      Right lower leg: No edema.      Left lower leg: No edema.   Lymphadenopathy:      Cervical: No cervical adenopathy.   Skin:     General: Skin is warm.   Neurological:      General: No focal deficit present.      Mental Status: She is alert and oriented to person, place, and time.

## 2024-12-04 ENCOUNTER — TELEPHONE (OUTPATIENT)
Dept: INTERNAL MEDICINE CLINIC | Facility: CLINIC | Age: 64
End: 2024-12-04

## 2024-12-04 NOTE — TELEPHONE ENCOUNTER
PA for Mounjaro 5mg SUBMITTED to [employer]    via    [x]CMM-KEY: [id number] Rx #: [id number]  []Surescripts-Case ID #   []Availity-Auth ID #   []Faxed to plan   []Other website   []Phone call Case ID #     [x]PA sent as URGENT    All office notes, labs and other pertaining documents and studies sent. Clinical questions answered. Awaiting determination from insurance company.     Turnaround time for your insurance to make a decision on your Prior Authorization can take 7-21 business days.

## 2024-12-05 NOTE — TELEPHONE ENCOUNTER
PA for Mounjaro 5mg APPROVED     Date(s) approved until 12/4/2025    Case #: [id number]    Patient advised by          []MyChart Message  [x]Phone call   [x]LMOM  []L/M to call office as no active Communication consent on file  []Unable to leave detailed message as VM not approved on Communication consent       Pharmacy advised by    [x]Fax  []Phone call    Approval letter scanned into [employer] Yes

## 2024-12-11 ENCOUNTER — HOSPITAL ENCOUNTER (OUTPATIENT)
Dept: RADIOLOGY | Age: 64
Discharge: HOME | End: 2024-12-11
Payer: COMMERCIAL

## 2024-12-11 VITALS — HEIGHT: 58 IN | WEIGHT: 178 LBS | BODY MASS INDEX: 37.36 KG/M2

## 2024-12-11 DIAGNOSIS — Z12.31 VISIT FOR SCREENING MAMMOGRAM: ICD-10-CM

## 2024-12-11 PROCEDURE — 77067 SCR MAMMO BI INCL CAD: CPT

## 2024-12-11 PROCEDURE — 77063 BREAST TOMOSYNTHESIS BI: CPT

## 2024-12-23 ENCOUNTER — RESULTS FOLLOW-UP (OUTPATIENT)
Dept: INTERNAL MEDICINE CLINIC | Facility: CLINIC | Age: 64
End: 2024-12-23

## 2024-12-26 ENCOUNTER — TELEPHONE (OUTPATIENT)
Age: 64
End: 2024-12-26

## 2024-12-26 NOTE — TELEPHONE ENCOUNTER
Patient's daughter called stating that patient is having difficulty paying for Basaglar and would like to know if something less expensive can be prescribed.  Please advise.

## 2024-12-30 ENCOUNTER — OFFICE VISIT (OUTPATIENT)
Dept: INTERNAL MEDICINE CLINIC | Facility: CLINIC | Age: 64
End: 2024-12-30
Payer: COMMERCIAL

## 2024-12-30 VITALS
BODY MASS INDEX: 37.16 KG/M2 | WEIGHT: 177 LBS | HEIGHT: 58 IN | HEART RATE: 90 BPM | OXYGEN SATURATION: 97 % | TEMPERATURE: 98.5 F | DIASTOLIC BLOOD PRESSURE: 84 MMHG | SYSTOLIC BLOOD PRESSURE: 142 MMHG

## 2024-12-30 DIAGNOSIS — E78.2 MIXED HYPERLIPIDEMIA: ICD-10-CM

## 2024-12-30 DIAGNOSIS — I10 ESSENTIAL HYPERTENSION: ICD-10-CM

## 2024-12-30 DIAGNOSIS — M12.812 ROTATOR CUFF ARTHROPATHY OF LEFT SHOULDER: ICD-10-CM

## 2024-12-30 DIAGNOSIS — E11.65 UNCONTROLLED TYPE 2 DIABETES MELLITUS WITH HYPERGLYCEMIA (HCC): Primary | ICD-10-CM

## 2024-12-30 DIAGNOSIS — M17.0 PRIMARY OSTEOARTHRITIS OF BOTH KNEES: ICD-10-CM

## 2024-12-30 PROCEDURE — 99214 OFFICE O/P EST MOD 30 MIN: CPT | Performed by: INTERNAL MEDICINE

## 2024-12-30 RX ORDER — PEN NEEDLE, DIABETIC 32GX 5/32"
NEEDLE, DISPOSABLE MISCELLANEOUS
Qty: 100 EACH | Refills: 0 | Status: SHIPPED | OUTPATIENT
Start: 2024-12-30

## 2024-12-30 RX ORDER — TIRZEPATIDE 2.5 MG/.5ML
2.5 INJECTION, SOLUTION SUBCUTANEOUS WEEKLY
Qty: 2 ML | Refills: 2 | Status: SHIPPED | OUTPATIENT
Start: 2024-12-30

## 2024-12-30 RX ORDER — ACYCLOVIR 400 MG/1
1 TABLET ORAL
Qty: 9 EACH | Refills: 3 | Status: SHIPPED | OUTPATIENT
Start: 2024-12-30

## 2024-12-30 RX ORDER — INSULIN GLARGINE 100 [IU]/ML
25 INJECTION, SOLUTION SUBCUTANEOUS
Qty: 9 ML | Refills: 2 | Status: SHIPPED | OUTPATIENT
Start: 2024-12-30

## 2024-12-30 NOTE — TELEPHONE ENCOUNTER
Spoke with patient, she stated she would like to discuss further during her appointment at 415pm today

## 2024-12-30 NOTE — PROGRESS NOTES
Assessment/Plan:             1. Uncontrolled type 2 diabetes mellitus with hyperglycemia (HCC)  -     Continuous Glucose Sensor (Dexcom G7 Sensor); Use 1 Device every 10 days  -     Tirzepatide (Mounjaro) 2.5 MG/0.5ML SOAJ; Inject 2.5 mg under the skin once a week  -     Insulin Glargine Solostar (Lantus SoloStar) 100 UNIT/ML SOPN; Inject 0.25 mL (25 Units total) under the skin daily at bedtime  -     Insulin Pen Needle (BD Pen Needle Dayna 2nd Gen) 32G X 4 MM MISC; For use with insulin pen. Pharmacy may dispense brand covered by insurance.  -     Albumin / creatinine urine ratio; Future  -     CBC and differential; Future  -     Comprehensive metabolic panel; Future  -     Lipid Panel with Direct LDL reflex; Future  -     TSH, 3rd generation; Future  -     Hemoglobin A1C; Future  2. Essential hypertension  Comments:  continue same med  Orders:  -     CBC and differential; Future  -     Comprehensive metabolic panel; Future  -     Lipid Panel with Direct LDL reflex; Future  -     TSH, 3rd generation; Future  3. Primary osteoarthritis of both knees  4. Rotator cuff arthropathy of left shoulder  5. Mixed hyperlipidemia  Comments:  continue same med  Orders:  -     Comprehensive metabolic panel; Future  -     Lipid Panel with Direct LDL reflex; Future  -     TSH, 3rd generation; Future         Subjective:      Patient ID: Michelle Rosa is a 64 y.o. female.    Follow-up on multiple medical problems to ensure they are stable on current medications        The following portions of the patient's history were reviewed and updated as appropriate: She  has a past medical history of Diabetes mellitus (HCC), Hypercholesterolemia, Hypertension, Rheumatoid arthritis (HCC), Sarcoidosis, and Sepsis (HCC) (09/08/2024).  She   Patient Active Problem List    Diagnosis Date Noted    Nontraumatic tear of left supraspinatus tendon 08/30/2024    Rotator cuff arthropathy of left shoulder 07/29/2024    B12 deficiency 04/29/2024     Supraclavicular mass 05/10/2023    Neck swelling 05/10/2023    Primary osteoarthritis of left knee 11/04/2022    Status post left knee replacement 11/04/2022    Rotator cuff tendonitis, left 04/08/2022    Nausea 05/18/2021    Mixed hyperlipidemia 05/18/2021    History of COVID-19 05/18/2021    Essential hypertension, benign 04/21/2021    Primary osteoarthritis of both knees 04/21/2021    Varicose veins of both lower extremities with pain 03/30/2021    Uncontrolled type 2 diabetes mellitus with hyperglycemia (HCC) 03/30/2021    Moderate persistent asthma without complication 12/23/2020    Allergic rhinitis 03/18/2020    Asthma 03/18/2020    Sarcoidosis 03/18/2020    Thalassemia 03/18/2020    Vitamin D deficiency 03/18/2020    Diabetes mellitus (HCC) 03/14/2019    Essential hypertension 03/14/2019     She  has a past surgical history that includes Colonoscopy (05/15/2009); Cholecystectomy; Mammo (historical) (09/30/2016); and Knee surgery (Left, 10/2022).  Her family history includes Coronary artery disease in her father; Diabetes in her maternal grandmother and mother; No Known Problems in her daughter, daughter, daughter, maternal aunt, maternal aunt, maternal aunt, paternal aunt, paternal aunt, paternal grandmother, sister, sister, sister, son, son, and son; Stomach cancer in her maternal grandfather.  She  reports that she has never smoked. She has never used smokeless tobacco. She reports that she does not currently use alcohol. She reports that she does not use drugs.  Current Outpatient Medications   Medication Sig Dispense Refill    Alcohol Swabs 70 % PADS May substitute brand based on insurance coverage. Check glucose TID. 100 each 0    Blood Glucose Monitoring Suppl (OneTouch Verio Reflect) w/Device KIT May substitute brand based on insurance coverage. Check glucose TID. 1 kit 0    Continuous Glucose Sensor (Dexcom G7 Sensor) Use 1 Device every 10 days 9 each 3    glipiZIDE (GLUCOTROL) 5 mg tablet take 1  tablet by mouth with breakfast 90 tablet 1    Insulin Glargine Solostar (Lantus SoloStar) 100 UNIT/ML SOPN Inject 0.25 mL (25 Units total) under the skin daily at bedtime 9 mL 2    Insulin Pen Needle (BD Pen Needle Dayna 2nd Gen) 32G X 4 MM MISC For use with insulin pen. Pharmacy may dispense brand covered by insurance. 100 each 0    Insulin Pen Needle 29G X 10MM MISC Use once a week 13 each 1    losartan-hydrochlorothiazide (HYZAAR) 100-25 MG per tablet Take 1 tablet by mouth daily 90 tablet 1    OneTouch Delica Lancets 33G MISC May substitute brand based on insurance coverage. Check glucose TID. 100 each 0    rosuvastatin (CRESTOR) 10 MG tablet Take 1 tablet (10 mg total) by mouth daily 90 tablet 1    Tirzepatide (Mounjaro) 2.5 MG/0.5ML SOAJ Inject 2.5 mg under the skin once a week 2 mL 2    Continuous Glucose Sensor (FreeStyle Jaquelin 3 Sensor) MISC Use 1 each every 14 (fourteen) days (Patient not taking: Reported on 10/21/2024) 6 each 3    glucose blood (OneTouch Verio) test strip May substitute brand based on insurance coverage. Check glucose TID. (Patient not taking: Reported on 10/21/2024) 100 each 0    UltiCare Mini Pen Needles 31G X 6 MM MISC  (Patient not taking: Reported on 9/16/2024)      verapamil (Verelan) 240 MG 24 hr capsule Take 1 capsule (240 mg total) by mouth daily at bedtime (Patient not taking: Reported on 12/30/2024) 90 capsule 1    vitamin B-12 (VITAMIN B-12) 1,000 mcg tablet Take 1 tablet (1,000 mcg total) by mouth daily (Patient not taking: Reported on 12/30/2024) 90 tablet 1     No current facility-administered medications for this visit.     Current Outpatient Medications on File Prior to Visit   Medication Sig    Alcohol Swabs 70 % PADS May substitute brand based on insurance coverage. Check glucose TID.    Blood Glucose Monitoring Suppl (OneTouch Verio Reflect) w/Device KIT May substitute brand based on insurance coverage. Check glucose TID.    glipiZIDE (GLUCOTROL) 5 mg tablet take 1  tablet by mouth with breakfast    Insulin Pen Needle 29G X 10MM MISC Use once a week    losartan-hydrochlorothiazide (HYZAAR) 100-25 MG per tablet Take 1 tablet by mouth daily    OneTouch Delica Lancets 33G MISC May substitute brand based on insurance coverage. Check glucose TID.    rosuvastatin (CRESTOR) 10 MG tablet Take 1 tablet (10 mg total) by mouth daily    [DISCONTINUED] Insulin Glargine Solostar (Basaglar KwikPen) 100 UNIT/ML SOPN Inject 0.25 mL (25 Units total) under the skin daily at bedtime    [DISCONTINUED] Insulin Pen Needle (BD Pen Needle Dayna 2nd Gen) 32G X 4 MM MISC For use with insulin pen. Pharmacy may dispense brand covered by insurance.    [DISCONTINUED] Tirzepatide 5 MG/0.5ML SOAJ Inject 5 mg under the skin once a week    Continuous Glucose Sensor (FreeStyle Jaquelin 3 Sensor) MISC Use 1 each every 14 (fourteen) days (Patient not taking: Reported on 10/21/2024)    glucose blood (OneTouch Verio) test strip May substitute brand based on insurance coverage. Check glucose TID. (Patient not taking: Reported on 10/21/2024)    UltiCare Mini Pen Needles 31G X 6 MM MISC  (Patient not taking: Reported on 9/16/2024)    verapamil (Verelan) 240 MG 24 hr capsule Take 1 capsule (240 mg total) by mouth daily at bedtime (Patient not taking: Reported on 12/30/2024)    vitamin B-12 (VITAMIN B-12) 1,000 mcg tablet Take 1 tablet (1,000 mcg total) by mouth daily (Patient not taking: Reported on 12/30/2024)     No current facility-administered medications on file prior to visit.     She is allergic to metformin and sulfa antibiotics..    Review of Systems   Constitutional:  Negative for chills and fever.   HENT:  Negative for congestion, ear pain and sore throat.    Eyes:  Negative for pain.   Respiratory:  Negative for cough and shortness of breath.    Cardiovascular:  Negative for chest pain and leg swelling.   Gastrointestinal:  Positive for nausea. Negative for abdominal pain and vomiting.   Endocrine: Negative for  "polyuria.   Genitourinary:  Negative for difficulty urinating, frequency and urgency.   Musculoskeletal:  Positive for arthralgias and back pain.   Skin:  Negative for rash.   Neurological:  Negative for weakness and headaches.   Psychiatric/Behavioral:  Negative for sleep disturbance. The patient is not nervous/anxious.          Objective:      /84 (BP Location: Left arm, Patient Position: Sitting, Cuff Size: Standard)   Pulse 90   Temp 98.5 °F (36.9 °C) (Temporal)   Ht 4' 10\" (1.473 m)   Wt 80.3 kg (177 lb)   SpO2 97%   BMI 36.99 kg/m²     No results found for this or any previous visit (from the past 8 weeks).     Physical Exam  Constitutional:       Appearance: Normal appearance.   HENT:      Head: Normocephalic.      Right Ear: External ear normal.      Left Ear: External ear normal.      Nose: Nose normal. No congestion.      Mouth/Throat:      Mouth: Mucous membranes are moist.      Pharynx: Oropharynx is clear. No oropharyngeal exudate or posterior oropharyngeal erythema.   Eyes:      Extraocular Movements: Extraocular movements intact.      Conjunctiva/sclera: Conjunctivae normal.   Cardiovascular:      Rate and Rhythm: Normal rate and regular rhythm.      Heart sounds: Normal heart sounds. No murmur heard.  Pulmonary:      Effort: Pulmonary effort is normal.      Breath sounds: Normal breath sounds. No wheezing or rales.   Abdominal:      General: Abdomen is flat. There is no distension.      Palpations: Abdomen is soft.      Tenderness: There is no abdominal tenderness.   Musculoskeletal:      Cervical back: Normal range of motion and neck supple.      Right lower leg: No edema.      Left lower leg: No edema.   Lymphadenopathy:      Cervical: No cervical adenopathy.   Skin:     General: Skin is warm.   Neurological:      General: No focal deficit present.      Mental Status: She is alert and oriented to person, place, and time.         "

## 2024-12-30 NOTE — TELEPHONE ENCOUNTER
Please have her daughter find out with insurance what is alternate covered for Basaglar which is good price that I can switch to

## 2025-01-27 DIAGNOSIS — E78.2 MIXED HYPERLIPIDEMIA: ICD-10-CM

## 2025-01-27 RX ORDER — ROSUVASTATIN CALCIUM 10 MG/1
10 TABLET, COATED ORAL DAILY
Qty: 90 TABLET | Refills: 1 | Status: SHIPPED | OUTPATIENT
Start: 2025-01-27

## 2025-02-12 ENCOUNTER — OFFICE VISIT (OUTPATIENT)
Dept: DIABETES SERVICES | Facility: CLINIC | Age: 65
End: 2025-02-12
Payer: COMMERCIAL

## 2025-02-12 VITALS — BODY MASS INDEX: 37.24 KG/M2 | WEIGHT: 178.2 LBS

## 2025-02-12 DIAGNOSIS — E11.65 UNCONTROLLED TYPE 2 DIABETES MELLITUS WITH HYPERGLYCEMIA (HCC): Primary | ICD-10-CM

## 2025-02-12 PROCEDURE — 98960 EDU&TRN PT SELF-MGMT NQHP 1: CPT

## 2025-02-12 NOTE — PROGRESS NOTES
Type 2 Diabetes Class Assessment    HPI: Met with Michelle Rosa for DSME initial visit. Michelle has type 2 diabetes.    Diabetes Assessment  Visit Type: Initial visit  Present at Session: tinoughterLourdes  Medical Diagnosis/ICD 10: E11.65 (ICD-10-CM) - Uncontrolled type 2 diabetes mellitus with hyperglycemia (HCC)   Special Learning Needs: No  Barriers to Learning: no barriers    How do you learn best? Auditory learner  What are you most interested in learning about regards to your diabetes? What numbers should BG be  How do you feel about making lifestyle changes at this time? Good  How would you rate your current knowledge of diabetes? Poor  How confident are you that will be able to take better control of your diabetes? Good    How long have you had diabetes? 3-4 years per patient  Have you had diabetes education in the past? No  Do you have any family members with diabetes? Yes - history of diabetes in her mom and grandmother on her mom's side   Do you monitor your blood sugar? yes  Type of blood sugar monitor: Dexcom G7  How old is your meter? New  How often do you test your blood sugars? Continuously; CGM  Do you keep a written record of your blood sugars? Reviewed Dexcom   Blood sugar log with patient today and reviewed by educator? No, but reviewed Dexcom   Blood Sugar ranges over the last month:  30% very high  45% high  25% time in range  0% low  0% very low  Any financial concerns pertaining to your diabetes supplies, medication or care? Not at this time  Have you ever experienced hypoglycemia? No  Have you ever been hospitalized or gone to the ER due to your blood sugars? Had kidney infection and then hospitalized longer due to high blood sugar  How do you treat low blood sugars? Drink juice  How do you treat high blood sugars? Drinks water  Do you wear a Diabetes I.D.? No  Where do you dispose of your sharps (needles,lancetes)? Sharps container at home    Lab Results   Component Value  "Date    HGBA1C 9.3 (H) 10/17/2024    HGBA1C 9.7 (H) 09/08/2024    HGBA1C 6.8 (H) 04/26/2024     Lab Results   Component Value Date    CHOL 195 10/20/2015    CHOL 207 01/23/2015     Lab Results   Component Value Date    HDL 48 10/17/2024    HDL 46 04/26/2024    HDL 50 10/20/2015     Lab Results   Component Value Date    LDLCALC 82 10/17/2024    LDLCALC 83 04/26/2024    LDLCALC 127 (H) 10/20/2015     Lab Results   Component Value Date    TRIG 103 10/17/2024    TRIG 114 04/26/2024    TRIG 91 10/20/2015     No results found for: \"CHOLHDL\"  No results found for: \"MICROALBUR\", \"[id number]\"    Body mass index is 37.24 kg/m².    Ht Readings from Last 1 Encounters:   12/30/24 4' 10\" (1.473 m)     Wt Readings from Last 1 Encounters:   02/12/25 80.8 kg (178 lb 3.2 oz)     Weight Change: No - weight is stable per patient    Diet Assessment    Do you follow any special diet presently? No  Who cooks at home? patient and spouse  Who does the grocery shopping? patient and spouse   How frequently do you eat out? 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  Who lives in your household?: lives with her spouse, son and grandson   Who is you primary support person(s): her granddaughter, Lourdes  Describe your quality of life currently?: good  Any concerns for your safety?: No  Any Denominational or cultural practices that may affect your diabetes care: No  Do you have a decrease or loss of hearing?: No  Do you have a decrease or loss of vision?: No  When was the last time you had an ophthalmology exam?: 6/19/24 per EMR  When " was the last time you had dental exam?: has not gone to the dentist in 1-2 years; needs a new dentist  Do you check your feet for cracks, sores, debris?: Yes  When was the last time you had podiatry or foot exam?: 4/29/24 at PCP visit  Last flu shot?: patient had flu shot this year for work, not documented in EMR  Pneumonia shot?: patient reports she had a pneumonia shot, not documented in EMR    The patient's history was reviewed and updated as appropriate: allergies, current medications.    Intervention    Diabetes Overview: Michelle was instructed on basic concepts of diabetes, including identifying role of diabetes self management, basic pathophysiology and types of diabetes, A1c and blood sugar targets. Michelle has good understanding of material covered.    Taking Medications: Instructed patient on action, side effects, efficacy, prescribed dosage and appropriate timing and frequency of administration of her diabetes medication. Michelle has good understanding of material covered.     Monitoring Blood Sugars  Denied questions regarding glucometer use today.    Testing frequency: Encouraged pair testing. Test sugars before a meal and 2hr after the same meal, rotating between breakfast, lunch, and dinner. Test sugars twice a day (3 days a week, 7 days a week).     Goal Blood Sugars:   Premeal , even better <110  2hr after a meal <180, even better <140  A1C <7%, even better <6.5%.    Hypoglycemia: Instructed patient on definition/risk of hypoglycemia, treatment, causes/symptoms, when to notify provider of lows, prevention of hypoglycemia and exercise precautions.  Comments: Michelle verbalizes understanding of hypoglycemia concepts      Physical Activity: Discussed benefits of physical activity to optimize blood glucose control, encouraged activity at patient is physically able. Always consult a physician prior to starting an exercise program.  Comments: Michelle verbalizes understanding of hypoglycemia concepts      Diabetes  "Education Record    Michelle received the following handouts: Class assessment folder - \"Diabetes Guidelines\", sharps disposal, eye care, foot care, hyperglycemia/hypoglycemia handout, 15/15 rule & \"Know Your Numbers\" handout    Patient response to instruction    Comprehension: good  Motivation: good  Expected Compliance: good  Response to Teachback: 100%, demonstrated understanding    Start- Stop: 3:00-3:47  Total Minutes: 47 Minutes  Group or Individual Instruction: DSME-I  Other: Fausto Jimenez MD    Thank you for referring your patient to [facility], it was a pleasure working with them today. Please feel free to call with any questions or concerns.    Nargis Craven, MS, RD, LDN  [address] [facility] [address]  [place] [id number]  "

## 2025-02-14 ENCOUNTER — TELEPHONE (OUTPATIENT)
Age: 65
End: 2025-02-14

## 2025-02-14 NOTE — TELEPHONE ENCOUNTER
No testing needed prior to OV. Previously evaluated in 2021 and is technically a new patient. Patient should have OV w/ AP.

## 2025-02-14 NOTE — TELEPHONE ENCOUNTER
Called and LVM, per Sumi pt does not need testing and should have a regular new patient visit with AP

## 2025-02-14 NOTE — TELEPHONE ENCOUNTER
Daughter called asking for a f/u appt. Last seen 2021 for varicose veins that are becoming more painful. R > L. Difficulty walking by end of work shift.  Pt is wearing teds, experiencing LE edema and right ankle bruising.   A new pt appt has been scheduled, but not until 4/14/25 due to their work schedules.   Do you want any testing prior to appt?

## 2025-02-27 ENCOUNTER — TELEPHONE (OUTPATIENT)
Dept: DIABETES SERVICES | Facility: CLINIC | Age: 65
End: 2025-02-27

## 2025-02-27 NOTE — TELEPHONE ENCOUNTER
L/M and my chart message to schedule Living Well classes. If patient calls back, please offer...    March 6, 13, 20, 27  6-8 pm  Virtual    Send back to Gia to add to schedule.

## 2025-03-10 ENCOUNTER — RA CDI HCC (OUTPATIENT)
Dept: OTHER | Facility: HOSPITAL | Age: 65
End: 2025-03-10

## 2025-03-10 NOTE — PROGRESS NOTES
HCC coding opportunities          Chart Reviewed number of suggestions sent to Provider: 2  E11.65  E66.01     Patients Insurance        Commercial Insurance: [employer]

## 2025-03-14 LAB
ALBUMIN SERPL-MCNC: 4 G/DL (ref 3.5–5.7)
ALBUMIN/CREAT UR: 12.3
ALP SERPL-CCNC: 65 U/L (ref 35–120)
ALT SERPL-CCNC: 10 U/L
ANION GAP SERPL CALCULATED.3IONS-SCNC: 9 MMOL/L (ref 3–11)
AST SERPL-CCNC: 14 U/L
BILIRUB SERPL-MCNC: 0.6 MG/DL (ref 0.2–1)
BUN SERPL-MCNC: 12 MG/DL (ref 7–25)
CALCIUM SERPL-MCNC: 9.7 MG/DL (ref 8.5–10.5)
CHLORIDE SERPL-SCNC: 101 MMOL/L (ref 100–109)
CHOLEST SERPL-MCNC: 169 MG/DL
CHOLEST/HDLC SERPL: 3.7 {RATIO}
CO2 SERPL-SCNC: 30 MMOL/L (ref 21–31)
CREAT SERPL-MCNC: 0.64 MG/DL (ref 0.4–1.1)
CREAT UR-MCNC: 105.7 MG/DL (ref 50–200)
CYTOLOGY CMNT CVX/VAG CYTO-IMP: ABNORMAL
GFR/BSA.PRED SERPLBLD CYS-BASED-ARV: 97 ML/MIN/{1.73_M2}
GLUCOSE SERPL-MCNC: 173 MG/DL (ref 65–99)
HDLC SERPL-MCNC: 46 MG/DL (ref 23–92)
LDLC SERPL CALC-MCNC: 101 MG/DL
MICROALBUMIN UR-MCNC: 1.3 MG/DL
NONHDLC SERPL-MCNC: 123 MG/DL
POTASSIUM SERPL-SCNC: 3.8 MMOL/L (ref 3.5–5.2)
PROT SERPL-MCNC: 6.9 G/DL (ref 6.3–8.3)
SODIUM SERPL-SCNC: 140 MMOL/L (ref 135–145)
TRIGL SERPL-MCNC: 108 MG/DL
TSH SERPL-ACNC: 1.23 UIU/ML (ref 0.45–5.33)

## 2025-03-15 LAB
BASOPHILS # BLD AUTO: 0 THOU/CMM (ref 0–0.1)
BASOPHILS NFR BLD AUTO: 1 %
DIFFERENTIAL METHOD BLD: NORMAL
EOSINOPHIL # BLD AUTO: 0.1 THOU/CMM (ref 0–0.5)
EOSINOPHIL NFR BLD AUTO: 1 %
ERYTHROCYTE [DISTWIDTH] IN BLOOD BY AUTOMATED COUNT: 13.8 % (ref 12–16)
EST. AVERAGE GLUCOSE BLD GHB EST-MCNC: 203 MG/DL
HBA1C MFR BLD: 8.7 %
HCT VFR BLD AUTO: 39.7 % (ref 35–43)
HGB BLD-MCNC: 13.3 G/DL (ref 11.5–14.5)
LYMPHOCYTES # BLD AUTO: 1.9 THOU/CMM (ref 1–3)
LYMPHOCYTES NFR BLD AUTO: 34 %
MCH RBC QN AUTO: 29.2 PG (ref 26–34)
MCHC RBC AUTO-ENTMCNC: 33.5 G/DL (ref 32–37)
MCV RBC AUTO: 87 FL (ref 80–100)
MONOCYTES # BLD AUTO: 0.5 THOU/CMM (ref 0.3–1)
MONOCYTES NFR BLD AUTO: 9 %
NEUTROPHILS # BLD AUTO: 3.2 THOU/CMM (ref 1.8–7.8)
NEUTROPHILS NFR BLD AUTO: 55 %
PLATELET # BLD AUTO: 208 THOU/CMM (ref 140–350)
PMV BLD REES-ECKER: 7.7 FL (ref 7.5–11.3)
RBC # BLD AUTO: 4.55 MILL/CMM (ref 3.7–4.7)
WBC # BLD AUTO: 5.7 THOU/CMM (ref 4–10)

## 2025-03-17 ENCOUNTER — OFFICE VISIT (OUTPATIENT)
Dept: INTERNAL MEDICINE CLINIC | Facility: CLINIC | Age: 65
End: 2025-03-17
Payer: COMMERCIAL

## 2025-03-17 VITALS
HEART RATE: 98 BPM | TEMPERATURE: 98 F | SYSTOLIC BLOOD PRESSURE: 132 MMHG | BODY MASS INDEX: 37.57 KG/M2 | HEIGHT: 58 IN | OXYGEN SATURATION: 99 % | DIASTOLIC BLOOD PRESSURE: 84 MMHG | WEIGHT: 179 LBS

## 2025-03-17 DIAGNOSIS — M12.812 ROTATOR CUFF ARTHROPATHY OF LEFT SHOULDER: ICD-10-CM

## 2025-03-17 DIAGNOSIS — E78.2 MIXED HYPERLIPIDEMIA: ICD-10-CM

## 2025-03-17 DIAGNOSIS — I10 ESSENTIAL HYPERTENSION, BENIGN: ICD-10-CM

## 2025-03-17 DIAGNOSIS — M17.0 PRIMARY OSTEOARTHRITIS OF BOTH KNEES: ICD-10-CM

## 2025-03-17 DIAGNOSIS — I83.813 VARICOSE VEINS OF BOTH LOWER EXTREMITIES WITH PAIN: ICD-10-CM

## 2025-03-17 DIAGNOSIS — E11.65 UNCONTROLLED TYPE 2 DIABETES MELLITUS WITH HYPERGLYCEMIA (HCC): Primary | ICD-10-CM

## 2025-03-17 PROBLEM — E11.36 TYPE 2 DIABETES MELLITUS WITH DIABETIC CATARACT, WITHOUT LONG-TERM CURRENT USE OF INSULIN (HCC): Status: ACTIVE | Noted: 2025-03-17

## 2025-03-17 PROBLEM — E11.36 TYPE 2 DIABETES MELLITUS WITH DIABETIC CATARACT, WITHOUT LONG-TERM CURRENT USE OF INSULIN (HCC): Status: RESOLVED | Noted: 2025-03-17 | Resolved: 2025-03-17

## 2025-03-17 PROCEDURE — 99214 OFFICE O/P EST MOD 30 MIN: CPT | Performed by: INTERNAL MEDICINE

## 2025-03-17 NOTE — PROGRESS NOTES
Assessment/Plan:             1. Uncontrolled type 2 diabetes mellitus with hyperglycemia (HCC)  -     Ambulatory Referral to Endocrinology; Future  2. Essential hypertension, benign  Comments:  continue same med  3. Varicose veins of both lower extremities with pain  4. Primary osteoarthritis of both knees  5. Rotator cuff arthropathy of left shoulder  6. Mixed hyperlipidemia  Comments:  continue same med         Subjective:      Patient ID: Michelle Rosa is a 64 y.o. female.    Follow-up on blood test done on 3/14/2025 test discussed with her        The following portions of the patient's history were reviewed and updated as appropriate: She  has a past medical history of Diabetes mellitus (HCC), Hypercholesterolemia, Hypertension, Rheumatoid arthritis (HCC), Sarcoidosis, and Sepsis (HCC) (09/08/2024).  She   Patient Active Problem List    Diagnosis Date Noted    Nontraumatic tear of left supraspinatus tendon 08/30/2024    Rotator cuff arthropathy of left shoulder 07/29/2024    B12 deficiency 04/29/2024    Supraclavicular mass 05/10/2023    Neck swelling 05/10/2023    Primary osteoarthritis of left knee 11/04/2022    Status post left knee replacement 11/04/2022    Rotator cuff tendonitis, left 04/08/2022    Nausea 05/18/2021    Mixed hyperlipidemia 05/18/2021    History of COVID-19 05/18/2021    Essential hypertension, benign 04/21/2021    Primary osteoarthritis of both knees 04/21/2021    Varicose veins of both lower extremities with pain 03/30/2021    Uncontrolled type 2 diabetes mellitus with hyperglycemia (HCC) 03/30/2021    Moderate persistent asthma without complication 12/23/2020    Allergic rhinitis 03/18/2020    Asthma 03/18/2020    Sarcoidosis 03/18/2020    Thalassemia 03/18/2020    Vitamin D deficiency 03/18/2020    Diabetes mellitus (HCC) 03/14/2019    Essential hypertension 03/14/2019     She  has a past surgical history that includes Colonoscopy (05/15/2009); Cholecystectomy; Mammo (historical)  (09/30/2016); and Knee surgery (Left, 10/2022).  Her family history includes Coronary artery disease in her father; Diabetes in her maternal grandmother and mother; No Known Problems in her daughter, daughter, daughter, maternal aunt, maternal aunt, maternal aunt, paternal aunt, paternal aunt, paternal grandmother, sister, sister, sister, son, son, and son; Stomach cancer in her maternal grandfather.  She  reports that she has never smoked. She has never used smokeless tobacco. She reports that she does not currently use alcohol. She reports that she does not use drugs.  Current Outpatient Medications   Medication Sig Dispense Refill    Alcohol Swabs 70 % PADS May substitute brand based on insurance coverage. Check glucose TID. 100 each 0    Blood Glucose Monitoring Suppl (OneTouch Verio Reflect) w/Device KIT May substitute brand based on insurance coverage. Check glucose TID. 1 kit 0    Continuous Glucose Sensor (Dexcom G7 Sensor) Use 1 Device every 10 days 9 each 3    glipiZIDE (GLUCOTROL) 5 mg tablet take 1 tablet by mouth with breakfast 90 tablet 1    Insulin Glargine Solostar (Lantus SoloStar) 100 UNIT/ML SOPN Inject 0.25 mL (25 Units total) under the skin daily at bedtime 9 mL 2    Insulin Pen Needle (BD Pen Needle Dayna 2nd Gen) 32G X 4 MM MISC For use with insulin pen. Pharmacy may dispense brand covered by insurance. 100 each 0    losartan-hydrochlorothiazide (HYZAAR) 100-25 MG per tablet Take 1 tablet by mouth daily 90 tablet 1    rosuvastatin (CRESTOR) 10 MG tablet take 1 tablet by mouth once daily 90 tablet 1    Tirzepatide (Mounjaro) 2.5 MG/0.5ML SOAJ Inject 2.5 mg under the skin once a week 2 mL 2    verapamil (Verelan) 240 MG 24 hr capsule Take 1 capsule (240 mg total) by mouth daily at bedtime 90 capsule 1    Continuous Glucose Sensor (FreeStyle Jaquelin 3 Sensor) [facility] Use 1 each every 14 (fourteen) days (Patient not taking: Reported on 3/17/2025) 6 each 3    glucose blood (OneTouch Verio) test strip May  substitute brand based on insurance coverage. Check glucose TID. (Patient not taking: Reported on 3/17/2025) 100 each 0    Insulin Pen Needle 29G X 10MM MISC Use once a week (Patient not taking: Reported on 3/17/2025) 13 each 1    OneTouch Delica Lancets 33G MISC May substitute brand based on insurance coverage. Check glucose TID. (Patient not taking: Reported on 3/17/2025) 100 each 0    UltiCare Mini Pen Needles 31G X 6 MM MISC  (Patient not taking: Reported on 9/16/2024)      vitamin B-12 (VITAMIN B-12) 1,000 mcg tablet Take 1 tablet (1,000 mcg total) by mouth daily (Patient not taking: Reported on 3/17/2025) 90 tablet 1     No current facility-administered medications for this visit.     Current Outpatient Medications on File Prior to Visit   Medication Sig    Alcohol Swabs 70 % PADS May substitute brand based on insurance coverage. Check glucose TID.    Blood Glucose Monitoring Suppl (OneTouch Verio Reflect) w/Device KIT May substitute brand based on insurance coverage. Check glucose TID.    Continuous Glucose Sensor (Dexcom G7 Sensor) Use 1 Device every 10 days    glipiZIDE (GLUCOTROL) 5 mg tablet take 1 tablet by mouth with breakfast    Insulin Glargine Solostar (Lantus SoloStar) 100 UNIT/ML SOPN Inject 0.25 mL (25 Units total) under the skin daily at bedtime    Insulin Pen Needle (BD Pen Needle Dayna 2nd Gen) 32G X 4 MM MISC For use with insulin pen. Pharmacy may dispense brand covered by insurance.    losartan-hydrochlorothiazide (HYZAAR) 100-25 MG per tablet Take 1 tablet by mouth daily    rosuvastatin (CRESTOR) 10 MG tablet take 1 tablet by mouth once daily    Tirzepatide (Mounjaro) 2.5 MG/0.5ML SOAJ Inject 2.5 mg under the skin once a week    verapamil (Verelan) 240 MG 24 hr capsule Take 1 capsule (240 mg total) by mouth daily at bedtime    Continuous Glucose Sensor (FreeStyle Jaquelin 3 Sensor) MISC Use 1 each every 14 (fourteen) days (Patient not taking: Reported on 3/17/2025)    glucose blood (OneTouch  "Verio) test strip May substitute brand based on insurance coverage. Check glucose TID. (Patient not taking: Reported on 3/17/2025)    Insulin Pen Needle 29G X 10MM MISC Use once a week (Patient not taking: Reported on 3/17/2025)    OneTouch Delica Lancets 33G MISC May substitute brand based on insurance coverage. Check glucose TID. (Patient not taking: Reported on 3/17/2025)    UltiCare Mini Pen Needles 31G X 6 MM MISC  (Patient not taking: Reported on 9/16/2024)    vitamin B-12 (VITAMIN B-12) 1,000 mcg tablet Take 1 tablet (1,000 mcg total) by mouth daily (Patient not taking: Reported on 3/17/2025)     No current facility-administered medications on file prior to visit.     She is allergic to metformin and sulfa antibiotics..    Review of Systems   Constitutional:  Negative for chills and fever.   HENT:  Negative for congestion, ear pain and sore throat.    Eyes:  Negative for pain.   Respiratory:  Negative for cough and shortness of breath.    Cardiovascular:  Negative for chest pain and leg swelling.   Gastrointestinal:  Negative for abdominal pain, nausea and vomiting.   Endocrine: Negative for polyuria.   Genitourinary:  Negative for difficulty urinating, frequency and urgency.   Musculoskeletal:  Positive for arthralgias. Negative for back pain.   Skin:  Negative for rash.   Neurological:  Negative for weakness and headaches.   Psychiatric/Behavioral:  Negative for sleep disturbance. The patient is not nervous/anxious.          Objective:      /84 (BP Location: Left arm, Patient Position: Sitting, Cuff Size: Standard)   Pulse 98   Temp 98 °F (36.7 °C) (Temporal)   Ht 4' 10\" (1.473 m)   Wt 81.2 kg (179 lb)   SpO2 99%   BMI 37.41 kg/m²     Recent Results (from the past 8 weeks)   Comprehensive metabolic panel    Collection Time: 03/14/25 10:42 AM   Result Value Ref Range    Glucose, Random 173 (H) 65 - 99 mg/dL    BUN 12 7 - 25 mg/dL    Creatinine 0.64 0.40 - 1.10 mg/dL    Sodium 140 135 - 145 mmol/L "    Potassium 3.8 3.5 - 5.2 mmol/L    Chloride 101 100 - 109 mmol/L    CO2 30 21 - 31 mmol/L    Calcium 9.7 8.5 - 10.5 mg/dL    Alkaline Phosphatase 65 35 - 120 U/L    Albumin 4.0 3.5 - 5.7 g/dL    TOTAL BILIRUBIN 0.6 0.2 - 1.0 mg/dL    Protein, Total 6.9 6.3 - 8.3 g/dL    AST 14 <41 U/L    ALT 10 <56 U/L    ANION GAP 9 3 - 11    eGFR 97 >59    Comment (Note)    Lipid Panel with Direct LDL reflex    Collection Time: 03/14/25 10:42 AM   Result Value Ref Range    Cholesterol, Total 169 <200 mg/dL    Triglycerides 108 <150 mg/dL    HDL Cholesterol 46 23 - 92 mg/dL    Non HDL Chol. (LDL+VLDL) 123 <160 mg/dL    Chol HDLC Ratio 3.7     LDL Calculated 101 <130 mg/dL   TSH, 3rd generation    Collection Time: 03/14/25 10:42 AM   Result Value Ref Range    TSH 1.23 0.45 - 5.33 uIU/mL   Albumin / creatinine urine ratio    Collection Time: 03/14/25 10:42 AM   Result Value Ref Range    Creatinine(Crt),U 105.7 50.0 - 200.0 mg/dL    Albumin,U,Random 1.3 <3.0 mg/dL    Microalb/Creat Ratio 12.3 <30.0 mg/gm CREA   Hemoglobin A1C    Collection Time: 03/14/25 10:42 AM   Result Value Ref Range    Hemoglobin A1C 8.7 (H) <5.7 %    Estimated Average Glucose 203 mg/dL   CBC and differential    Collection Time: 03/14/25 10:42 AM   Result Value Ref Range    Hemoglobin 13.3 11.5 - 14.5 g/dL    HCT 39.7 35.0 - 43.0 %    White Blood Cell Count 5.7 4.0 - 10.0 thou/cmm    Red Blood Cell Count 4.55 3.70 - 4.70 mill/cmm    Platelet Count 208 140 - 350 thou/cmm    SL AMB MPV 7.7 7.5 - 11.3 fL    MCV 87 80 - 100 fL    MCH 29.2 26.0 - 34.0 pg    MCHC 33.5 32.0 - 37.0 g/dL    RDW 13.8 12.0 - 16.0 %    Differential Type AUTO     Neutrophils (Absolute) 3.2 1.8 - 7.8 thou/cmm    Lymphocytes (Absolute) 1.9 1.0 - 3.0 thou/cmm    Monocytes (Absolute) 0.5 0.3 - 1.0 thou/cmm    Eosinophils (Absolute) 0.1 0.0 - 0.5 thou/cmm    Basophils ABS 0.0 0.0 - 0.1 thou/cmm    Neutrophils 55 %    Lymphocytes 34 %    Monocytes 9 %    Eosinophils 1 %    Basophils PCT 1 %         Physical Exam  Constitutional:       Appearance: Normal appearance.   HENT:      Head: Normocephalic.      Right Ear: External ear normal.      Left Ear: External ear normal.      Nose: Nose normal. No congestion.      Mouth/Throat:      Mouth: Mucous membranes are moist.      Pharynx: Oropharynx is clear. No oropharyngeal exudate or posterior oropharyngeal erythema.   Eyes:      Extraocular Movements: Extraocular movements intact.      Conjunctiva/sclera: Conjunctivae normal.   Cardiovascular:      Rate and Rhythm: Normal rate and regular rhythm.      Heart sounds: Normal heart sounds. No murmur heard.  Pulmonary:      Effort: Pulmonary effort is normal.      Breath sounds: Normal breath sounds. No wheezing or rales.   Abdominal:      General: Abdomen is flat. There is no distension.      Palpations: Abdomen is soft.      Tenderness: There is no abdominal tenderness.   Musculoskeletal:      Cervical back: Normal range of motion and neck supple.      Right lower leg: No edema.      Left lower leg: No edema.   Lymphadenopathy:      Cervical: No cervical adenopathy.   Skin:     General: Skin is warm.   Neurological:      General: No focal deficit present.      Mental Status: She is alert and oriented to person, place, and time.

## 2025-04-14 ENCOUNTER — TELEPHONE (OUTPATIENT)
Dept: VASCULAR SURGERY | Facility: CLINIC | Age: 65
End: 2025-04-14

## 2025-04-14 DIAGNOSIS — I10 ESSENTIAL HYPERTENSION: ICD-10-CM

## 2025-04-14 NOTE — TELEPHONE ENCOUNTER
Patients daughter called returning a call, stated she received a call on 04/11/25 that provider would not be in the office today and needed to resched.patient is now sched for 06/02/25 in [facility] with Dr Osorio

## 2025-04-14 NOTE — TELEPHONE ENCOUNTER
Called pt and left a message. Pt did not show up to their appointment on 04/14/25 with [facility]. If they call back please reschedule

## 2025-04-16 RX ORDER — VERAPAMIL HYDROCHLORIDE 240 MG/1
240 CAPSULE, DELAYED RELEASE ORAL
Qty: 90 CAPSULE | Refills: 1 | Status: SHIPPED | OUTPATIENT
Start: 2025-04-16

## 2025-04-16 RX ORDER — LOSARTAN POTASSIUM AND HYDROCHLOROTHIAZIDE 25; 100 MG/1; MG/1
1 TABLET ORAL DAILY
Qty: 90 TABLET | Refills: 1 | Status: SHIPPED | OUTPATIENT
Start: 2025-04-16

## 2025-04-25 ENCOUNTER — TELEPHONE (OUTPATIENT)
Dept: OBGYN CLINIC | Facility: CLINIC | Age: 65
End: 2025-04-25

## 2025-04-25 NOTE — TELEPHONE ENCOUNTER
Caller: Patient    Doctor: Dr. Elder    Reason for call: Patient confirmed she's coming in for her left shoulder    Call back#: [phone]

## 2025-04-25 NOTE — TELEPHONE ENCOUNTER
LM FOR PT TO VERIFY WHICH SHOULDER SHE IS COMING IN FOR. LAST VISIT WAS LEFT SHOULDER APPOINTMENT VISIT STATES RIGHT

## 2025-04-28 ENCOUNTER — OFFICE VISIT (OUTPATIENT)
Dept: OBGYN CLINIC | Facility: OTHER | Age: 65
End: 2025-04-28
Payer: COMMERCIAL

## 2025-04-28 DIAGNOSIS — E11.65 UNCONTROLLED TYPE 2 DIABETES MELLITUS WITH HYPERGLYCEMIA (HCC): ICD-10-CM

## 2025-04-28 DIAGNOSIS — M19.012 PRIMARY OSTEOARTHRITIS OF LEFT SHOULDER: Primary | ICD-10-CM

## 2025-04-28 DIAGNOSIS — M75.102 NONTRAUMATIC TEAR OF LEFT SUPRASPINATUS TENDON: ICD-10-CM

## 2025-04-28 PROCEDURE — 20610 DRAIN/INJ JOINT/BURSA W/O US: CPT | Performed by: ORTHOPAEDIC SURGERY

## 2025-04-28 PROCEDURE — 99213 OFFICE O/P EST LOW 20 MIN: CPT | Performed by: ORTHOPAEDIC SURGERY

## 2025-04-28 RX ORDER — BETAMETHASONE SODIUM PHOSPHATE AND BETAMETHASONE ACETATE 3; 3 MG/ML; MG/ML
6 INJECTION, SUSPENSION INTRA-ARTICULAR; INTRALESIONAL; INTRAMUSCULAR; SOFT TISSUE
Status: COMPLETED | OUTPATIENT
Start: 2025-04-28 | End: 2025-04-28

## 2025-04-28 RX ORDER — BUPIVACAINE HYDROCHLORIDE 2.5 MG/ML
2 INJECTION, SOLUTION INFILTRATION; PERINEURAL
Status: COMPLETED | OUTPATIENT
Start: 2025-04-28 | End: 2025-04-28

## 2025-04-28 RX ADMIN — BETAMETHASONE SODIUM PHOSPHATE AND BETAMETHASONE ACETATE 6 MG: 3; 3 INJECTION, SUSPENSION INTRA-ARTICULAR; INTRALESIONAL; INTRAMUSCULAR; SOFT TISSUE at 09:15

## 2025-04-28 RX ADMIN — BUPIVACAINE HYDROCHLORIDE 2 ML: 2.5 INJECTION, SOLUTION INFILTRATION; PERINEURAL at 09:15

## 2025-04-28 NOTE — ASSESSMENT & PLAN NOTE
The patient has imaging and an exam that continues to be consistent with left shoulder osteoarthritis and nontraumatic supraspinatus tear.  I have again discussed with the patient the pathophysiology of this diagnosis and reviewed how the examination correlates with this diagnosis.    Surgical vs conservative treatment options were discussed at length and after discussing these treatment options, the patient elected for a glenohumeral joint injection today as the subacromial injection on 8/29/24 provided her with little benefit.  Injection can be repeated in 4-6 mos if needed.   We did discuss surgical options which include reverse total shoulder arthroplasty but clearly we would exhaust nonoperative measures before considering surgical intervention. Her last A1c was 8.7.   Patient was accompanied by family member who helped with interpretation and all questions were answered prior to the conclusion of the visit.   Follow up on an as needed basis

## 2025-04-28 NOTE — PROGRESS NOTES
Assessment & Plan  Nontraumatic tear of left supraspinatus tendon    Primary osteoarthritis of left shoulder  The patient has imaging and an exam that continues to be consistent with left shoulder osteoarthritis and nontraumatic supraspinatus tear.  I have again discussed with the patient the pathophysiology of this diagnosis and reviewed how the examination correlates with this diagnosis.    Surgical vs conservative treatment options were discussed at length and after discussing these treatment options, the patient elected for a glenohumeral joint injection today as the subacromial injection on 8/29/24 provided her with little benefit.  Injection can be repeated in 4-6 mos if needed.   We did discuss surgical options which include reverse total shoulder arthroplasty but clearly we would exhaust nonoperative measures before considering surgical intervention. Her last A1c was 8.7.   Patient was accompanied by family member who helped with interpretation and all questions were answered prior to the conclusion of the visit.   Follow up on an as needed basis    Subjective:   Patient ID: Michelle Rosa is a 64 y.o. female presents today for a follow up visit for her left shoulder. She has been treating for severe glenohumeral joint osteoarthritis with known supraspinatus tendon tear. Today, she reports minimal benefit from a subacromial injection that was performed on 8/29/24. She continues with a home exercise program with limited benefit as well. Pain is worse with any motion, especially overhead and repetitive motions. She also continues to be awoken at night due to the pain. No numbness or tingling. No fevers or chills.     The following portions of the patient's history were reviewed and updated as appropriate: allergies, current medications, past family history, past medical history, past social history, past surgical history and problem list.    Objective:  There were no vitals taken for this visit.      Left  "Shoulder Exam     Range of Motion   External rotation:  40   Left shoulder forward flexion: AROM: 110. PROM: 140.     Muscle Strength   Abduction: 4/5   External rotation: 4/5     Other   Erythema: absent  Sensation: normal  Pulse: present             Physical Exam  Constitutional:       Appearance: She is well-developed.   Eyes:      Pupils: Pupils are equal, round, and reactive to light.   Pulmonary:      Effort: Pulmonary effort is normal.      Breath sounds: Normal breath sounds.   Skin:     General: Skin is warm and dry.   Neurological:      Mental Status: She is alert and oriented to person, place, and time.   Psychiatric:         Behavior: Behavior normal.         Thought Content: Thought content normal.         Judgment: Judgment normal.         Large joint arthrocentesis: L glenohumeral  Universal Protocol:  procedure performed by consultantConsent: Verbal consent obtained.  Risks and benefits: risks, benefits and alternatives were discussed  Consent given by: patient  Time out: Immediately prior to procedure a \"time out\" was called to verify the correct patient, procedure, equipment, support staff and site/side marked as required.  Site marked: the operative site was marked  Radiology Images displayed and confirmed. If images not available, report reviewed: imaging studies available  Patient identity confirmed: verbally with patient  Supporting Documentation  Indications: pain and diagnostic evaluation     Is this a Visco injection? NoProcedure Details  Location: shoulder - L glenohumeral  Preparation: Patient was prepped and draped in the usual sterile fashion  Needle size: 22 G  Ultrasound guidance: no  Approach: posterior  Medications administered: 2 mL bupivacaine 0.25 %; 6 mg betamethasone acetate-betamethasone sodium phosphate 6 (3-3) mg/mL    Patient tolerance: patient tolerated the procedure well with no immediate complications  Dressing:  Sterile dressing applied        I have personally reviewed " pertinent films in PACS and my interpretation is as follows.    MRI left shoulder demonstrates moderate-severe GH degenerative changes, moderate AC OA, partial thickness supraspinatus tear.       Records Reviewed: office notes from previous office visit on 8/24/24    Scribe Attestation      I,:  Victoriano Gallegos am acting as a scribe while in the presence of the attending physician.:       I,:  Phi Elder MD personally performed the services described in this documentation    as scribed in my presence.:

## 2025-04-29 RX ORDER — GLIPIZIDE 5 MG/1
TABLET ORAL
Qty: 90 TABLET | Refills: 1 | Status: SHIPPED | OUTPATIENT
Start: 2025-04-29 | End: 2025-05-01 | Stop reason: SDUPTHER

## 2025-05-01 ENCOUNTER — OFFICE VISIT (OUTPATIENT)
Dept: INTERNAL MEDICINE CLINIC | Facility: CLINIC | Age: 65
End: 2025-05-01
Payer: COMMERCIAL

## 2025-05-01 VITALS
DIASTOLIC BLOOD PRESSURE: 82 MMHG | SYSTOLIC BLOOD PRESSURE: 130 MMHG | TEMPERATURE: 98.6 F | HEIGHT: 58 IN | WEIGHT: 181 LBS | OXYGEN SATURATION: 98 % | HEART RATE: 84 BPM | BODY MASS INDEX: 37.99 KG/M2

## 2025-05-01 DIAGNOSIS — E11.65 UNCONTROLLED TYPE 2 DIABETES MELLITUS WITH HYPERGLYCEMIA (HCC): Primary | ICD-10-CM

## 2025-05-01 DIAGNOSIS — Z00.00 ANNUAL PHYSICAL EXAM: ICD-10-CM

## 2025-05-01 DIAGNOSIS — M17.0 PRIMARY OSTEOARTHRITIS OF BOTH KNEES: ICD-10-CM

## 2025-05-01 DIAGNOSIS — E78.2 MIXED HYPERLIPIDEMIA: ICD-10-CM

## 2025-05-01 DIAGNOSIS — I10 ESSENTIAL HYPERTENSION: ICD-10-CM

## 2025-05-01 DIAGNOSIS — M12.812 ROTATOR CUFF ARTHROPATHY OF LEFT SHOULDER: ICD-10-CM

## 2025-05-01 DIAGNOSIS — J45.40 MODERATE PERSISTENT ASTHMA WITHOUT COMPLICATION: ICD-10-CM

## 2025-05-01 PROCEDURE — 99397 PER PM REEVAL EST PAT 65+ YR: CPT | Performed by: INTERNAL MEDICINE

## 2025-05-01 PROCEDURE — 99214 OFFICE O/P EST MOD 30 MIN: CPT | Performed by: INTERNAL MEDICINE

## 2025-05-01 RX ORDER — TIRZEPATIDE 2.5 MG/.5ML
2.5 INJECTION, SOLUTION SUBCUTANEOUS WEEKLY
Qty: 2 ML | Refills: 2 | Status: SHIPPED | OUTPATIENT
Start: 2025-05-01

## 2025-05-01 RX ORDER — ROSUVASTATIN CALCIUM 10 MG/1
10 TABLET, COATED ORAL DAILY
Qty: 90 TABLET | Refills: 1 | Status: SHIPPED | OUTPATIENT
Start: 2025-05-01

## 2025-05-01 RX ORDER — GLIPIZIDE 5 MG/1
5 TABLET ORAL DAILY
Qty: 90 TABLET | Refills: 1 | Status: SHIPPED | OUTPATIENT
Start: 2025-05-01

## 2025-05-01 RX ORDER — INSULIN GLARGINE 100 [IU]/ML
30 INJECTION, SOLUTION SUBCUTANEOUS
Qty: 9 ML | Refills: 2 | Status: SHIPPED | OUTPATIENT
Start: 2025-05-01

## 2025-05-01 RX ORDER — LOSARTAN POTASSIUM AND HYDROCHLOROTHIAZIDE 25; 100 MG/1; MG/1
1 TABLET ORAL DAILY
Qty: 90 TABLET | Refills: 1 | Status: SHIPPED | OUTPATIENT
Start: 2025-05-01

## 2025-05-01 RX ORDER — ACYCLOVIR 400 MG/1
1 TABLET ORAL
Qty: 9 EACH | Refills: 3 | Status: SHIPPED | OUTPATIENT
Start: 2025-05-01

## 2025-05-01 NOTE — PROGRESS NOTES
Adult Annual Physical  Name: Michelle Rosa      : 1960      MRN: [id number]  Encounter Provider: Fausto Jimenez MD  Encounter Date: 2025   Encounter department: [facility] [address]    :  Assessment & Plan  Uncontrolled type 2 diabetes mellitus with hyperglycemia (HCC)    Lab Results   Component Value Date    HGBA1C 8.7 (H) 2025       Orders:  •  Continuous Glucose Sensor (Dexcom G7 Sensor); Use 1 Device every 10 days  •  Insulin Glargine Solostar (Lantus SoloStar) 100 UNIT/ML SOPN; Inject 0.3 mL (30 Units total) under the skin daily at bedtime  •  Albumin / creatinine urine ratio; Future  •  CBC and differential; Future  •  Comprehensive metabolic panel; Future  •  Lipid Panel with Direct LDL reflex; Future  •  TSH, 3rd generation; Future  •  Hemoglobin A1C; Future    Essential hypertension    Orders:  •  CBC and differential; Future  •  Comprehensive metabolic panel; Future  •  Lipid Panel with Direct LDL reflex; Future  •  TSH, 3rd generation; Future    Primary osteoarthritis of both knees         Rotator cuff arthropathy of left shoulder         Mixed hyperlipidemia    Orders:  •  Comprehensive metabolic panel; Future  •  Lipid Panel with Direct LDL reflex; Future  •  TSH, 3rd generation; Future    Annual physical exam         Moderate persistent asthma without complication         Uncontrolled type 2 diabetes mellitus with hyperglycemia (HCC)    Lab Results   Component Value Date    HGBA1C 8.7 (H) 2025   Continue same med       Essential hypertension  Continue same med       Primary osteoarthritis of both knees         Rotator cuff arthropathy of left shoulder         Mixed hyperlipidemia  Continue same med       Annual physical exam               Preventive Screenings:    - Cervical cancer screening: screening not indicated   - Breast cancer screening: screening up-to-date     Immunizations:  - Immunizations due: Zoster (Shingrix)         History of Present  Illness     Adult Annual Physical:  Patient presents for annual physical. physical.     Diet and Physical Activity:  - Diet/Nutrition: diabetic diet.  - Exercise: moderate cardiovascular exercise.    Depression Screening:  - PHQ-2 Score: 0    General Health:  - Sleep: sleeps well.  - Hearing: normal hearing bilateral ears.  - Vision: no vision problems.  - Dental: regular dental visits.    /GYN Health:  - Follows with GYN: yes.   - History of STDs: no    Advanced Care Planning:  - Has an advanced directive?: yes    - Has a durable medical POA?: yes    - ACP document given to patient?: no      Review of Systems   Constitutional:  Negative for chills and fever.   HENT:  Negative for congestion, ear pain and sore throat.    Eyes:  Negative for pain.   Respiratory:  Negative for cough and shortness of breath.    Cardiovascular:  Negative for chest pain and leg swelling.   Gastrointestinal:  Negative for abdominal pain, nausea and vomiting.   Endocrine: Negative for polyuria.   Genitourinary:  Negative for difficulty urinating, frequency and urgency.   Musculoskeletal:  Positive for arthralgias. Negative for back pain.   Skin:  Negative for rash.   Neurological:  Negative for weakness and headaches.   Psychiatric/Behavioral:  Negative for sleep disturbance. The patient is not nervous/anxious.      Current Outpatient Medications on File Prior to Visit   Medication Sig Dispense Refill   • Alcohol Swabs 70 % PADS May substitute brand based on insurance coverage. Check glucose TID. 100 each 0   • glipiZIDE (GLUCOTROL) 5 mg tablet take 1 tablet by mouth with breakfast 90 tablet 1   • Insulin Pen Needle (BD Pen Needle Dayna 2nd Gen) 32G X 4 MM MISC For use with insulin pen. Pharmacy may dispense brand covered by insurance. 100 each 0   • losartan-hydrochlorothiazide (HYZAAR) 100-25 MG per tablet take 1 tablet by mouth once daily 90 tablet 1   • rosuvastatin (CRESTOR) 10 MG tablet take 1 tablet by mouth once daily 90 tablet 1    • Tirzepatide (Mounjaro) 2.5 MG/0.5ML SOAJ Inject 2.5 mg under the skin once a week 2 mL 2   • vitamin B-12 (VITAMIN B-12) 1,000 mcg tablet Take 1 tablet (1,000 mcg total) by mouth daily 90 tablet 1   • [DISCONTINUED] Continuous Glucose Sensor (Dexcom G7 Sensor) Use 1 Device every 10 days 9 each 3   • [DISCONTINUED] Insulin Glargine Solostar (Lantus SoloStar) 100 UNIT/ML SOPN Inject 0.25 mL (25 Units total) under the skin daily at bedtime 9 mL 2   • Blood Glucose Monitoring Suppl (OneTouch Verio Reflect) w/Device KIT May substitute brand based on insurance coverage. Check glucose TID. (Patient not taking: Reported on 5/1/2025) 1 kit 0   • Continuous Glucose Sensor (FreeStyle Jaquelin 3 Sensor) MISC Use 1 each every 14 (fourteen) days (Patient not taking: Reported on 5/1/2025) 6 each 3   • glucose blood (OneTouch Verio) test strip May substitute brand based on insurance coverage. Check glucose TID. (Patient not taking: Reported on 5/1/2025) 100 each 0   • Insulin Pen Needle 29G X 10MM MISC Use once a week (Patient not taking: Reported on 3/17/2025) 13 each 1   • OneTouch Delica Lancets 33G MISC May substitute brand based on insurance coverage. Check glucose TID. (Patient not taking: Reported on 4/28/2025) 100 each 0   • UltiCare Mini Pen Needles 31G X 6 MM MISC  (Patient not taking: Reported on 9/16/2024)     • verapamil (Verelan) 240 MG 24 hr capsule take 1 capsule by mouth daily at bedtime (Patient not taking: Reported on 5/1/2025) 90 capsule 1     No current facility-administered medications on file prior to visit.      Social History     Tobacco Use   • Smoking status: Never   • Smokeless tobacco: Never   Vaping Use   • Vaping status: Never Used   Substance and Sexual Activity   • Alcohol use: Not Currently   • Drug use: Never   • Sexual activity: Yes     Partners: Male       Objective   /82 (BP Location: Left arm, Patient Position: Sitting, Cuff Size: Standard)   Pulse 84   Temp 98.6 °F (37 °C) (Temporal)  "  Ht 4' 10\" (1.473 m)   Wt 82.1 kg (181 lb)   SpO2 98%   BMI 37.83 kg/m²     Physical Exam  Vitals and nursing note reviewed.   Constitutional:       General: She is not in acute distress.     Appearance: She is well-developed.   HENT:      Head: Normocephalic and atraumatic.      Right Ear: Tympanic membrane, ear canal and external ear normal.      Left Ear: Tympanic membrane, ear canal and external ear normal.      Mouth/Throat:      Mouth: Mucous membranes are moist.      Pharynx: Oropharynx is clear.   Eyes:      Extraocular Movements: Extraocular movements intact.      Conjunctiva/sclera: Conjunctivae normal.   Cardiovascular:      Rate and Rhythm: Normal rate and regular rhythm.      Heart sounds: Normal heart sounds. No murmur heard.  Pulmonary:      Effort: Pulmonary effort is normal. No respiratory distress.      Breath sounds: Normal breath sounds. No wheezing or rales.   Abdominal:      General: Abdomen is flat. There is no distension.      Palpations: Abdomen is soft.      Tenderness: There is no abdominal tenderness.   Musculoskeletal:         General: No swelling.      Cervical back: Neck supple.      Right lower leg: No edema.      Left lower leg: No edema.   Skin:     General: Skin is warm and dry.      Capillary Refill: Capillary refill takes less than 2 seconds.   Neurological:      General: No focal deficit present.      Mental Status: She is alert and oriented to person, place, and time.   Psychiatric:         Mood and Affect: Mood normal.         "

## 2025-05-01 NOTE — ASSESSMENT & PLAN NOTE
Lab Results   Component Value Date    HGBA1C 8.7 (H) 03/14/2025       Orders:  •  Continuous Glucose Sensor (Dexcom G7 Sensor); Use 1 Device every 10 days  •  Insulin Glargine Solostar (Lantus SoloStar) 100 UNIT/ML SOPN; Inject 0.3 mL (30 Units total) under the skin daily at bedtime  •  Albumin / creatinine urine ratio; Future  •  CBC and differential; Future  •  Comprehensive metabolic panel; Future  •  Lipid Panel with Direct LDL reflex; Future  •  TSH, 3rd generation; Future  •  Hemoglobin A1C; Future

## 2025-05-01 NOTE — ASSESSMENT & PLAN NOTE
Orders:  •  CBC and differential; Future  •  Comprehensive metabolic panel; Future  •  Lipid Panel with Direct LDL reflex; Future  •  TSH, 3rd generation; Future

## 2025-05-01 NOTE — ASSESSMENT & PLAN NOTE
Orders:  •  Comprehensive metabolic panel; Future  •  Lipid Panel with Direct LDL reflex; Future  •  TSH, 3rd generation; Future

## 2025-05-01 NOTE — PROGRESS NOTES
Assessment/Plan:             1. Uncontrolled type 2 diabetes mellitus with hyperglycemia (HCC)  Assessment & Plan:    Lab Results   Component Value Date    HGBA1C 8.7 (H) 03/14/2025   Continue same med  Orders:  -     Continuous Glucose Sensor (Dexcom G7 Sensor); Use 1 Device every 10 days  -     Insulin Glargine Solostar (Lantus SoloStar) 100 UNIT/ML SOPN; Inject 0.3 mL (30 Units total) under the skin daily at bedtime  -     Albumin / creatinine urine ratio; Future  -     CBC and differential; Future  -     Comprehensive metabolic panel; Future  -     Lipid Panel with Direct LDL reflex; Future  -     TSH, 3rd generation; Future  -     Hemoglobin A1C; Future  2. Essential hypertension  Assessment & Plan:  Continue same med  Orders:  -     CBC and differential; Future  -     Comprehensive metabolic panel; Future  -     Lipid Panel with Direct LDL reflex; Future  -     TSH, 3rd generation; Future  3. Primary osteoarthritis of both knees  Assessment & Plan:         4. Rotator cuff arthropathy of left shoulder  Assessment & Plan:         5. Mixed hyperlipidemia  Assessment & Plan:  Continue same med  Orders:  -     Comprehensive metabolic panel; Future  -     Lipid Panel with Direct LDL reflex; Future  -     TSH, 3rd generation; Future  6. Annual physical exam  7. Moderate persistent asthma without complication  Assessment & Plan:                Subjective:      Patient ID: Michelle Rosa is a 64 y.o. female.    Follow-up on multiple medical problems which are stable on current medication, also physical        The following portions of the patient's history were reviewed and updated as appropriate: She  has a past medical history of Diabetes mellitus (HCC), Hypercholesterolemia, Hypertension, Rheumatoid arthritis (HCC), Sarcoidosis, and Sepsis (HCC) (09/08/2024).  She   Patient Active Problem List    Diagnosis Date Noted   • Primary osteoarthritis of left shoulder 04/28/2025   • Nontraumatic tear of left supraspinatus  tendon 08/30/2024   • Rotator cuff arthropathy of left shoulder 07/29/2024   • B12 deficiency 04/29/2024   • Supraclavicular mass 05/10/2023   • Neck swelling 05/10/2023   • Primary osteoarthritis of left knee 11/04/2022   • Status post left knee replacement 11/04/2022   • Rotator cuff tendonitis, left 04/08/2022   • Nausea 05/18/2021   • Mixed hyperlipidemia 05/18/2021   • History of COVID-19 05/18/2021   • Essential hypertension, benign 04/21/2021   • Primary osteoarthritis of both knees 04/21/2021   • Varicose veins of both lower extremities with pain 03/30/2021   • Uncontrolled type 2 diabetes mellitus with hyperglycemia (HCC) 03/30/2021   • Moderate persistent asthma without complication 12/23/2020   • Allergic rhinitis 03/18/2020   • Asthma 03/18/2020   • Sarcoidosis 03/18/2020   • Thalassemia 03/18/2020   • Vitamin D deficiency 03/18/2020   • Diabetes mellitus (HCC) 03/14/2019   • Essential hypertension 03/14/2019     She  has a past surgical history that includes Colonoscopy (05/15/2009); Cholecystectomy; Mammo (historical) (09/30/2016); and Knee surgery (Left, 10/2022).  Her family history includes Coronary artery disease in her father; Diabetes in her maternal grandmother and mother; No Known Problems in her daughter, daughter, daughter, maternal aunt, maternal aunt, maternal aunt, paternal aunt, paternal aunt, paternal grandmother, sister, sister, sister, son, son, and son; Stomach cancer in her maternal grandfather.  She  reports that she has never smoked. She has never used smokeless tobacco. She reports that she does not currently use alcohol. She reports that she does not use drugs.  Current Outpatient Medications   Medication Sig Dispense Refill   • Alcohol Swabs 70 % PADS May substitute brand based on insurance coverage. Check glucose TID. 100 each 0   • Continuous Glucose Sensor (Dexcom G7 Sensor) Use 1 Device every 10 days 9 each 3   • glipiZIDE (GLUCOTROL) 5 mg tablet take 1 tablet by mouth with  breakfast 90 tablet 1   • Insulin Glargine Solostar (Lantus SoloStar) 100 UNIT/ML SOPN Inject 0.3 mL (30 Units total) under the skin daily at bedtime 9 mL 2   • Insulin Pen Needle (BD Pen Needle Dayna 2nd Gen) 32G X 4 MM MISC For use with insulin pen. Pharmacy may dispense brand covered by insurance. 100 each 0   • losartan-hydrochlorothiazide (HYZAAR) 100-25 MG per tablet take 1 tablet by mouth once daily 90 tablet 1   • rosuvastatin (CRESTOR) 10 MG tablet take 1 tablet by mouth once daily 90 tablet 1   • Tirzepatide (Mounjaro) 2.5 MG/0.5ML SOAJ Inject 2.5 mg under the skin once a week 2 mL 2   • vitamin B-12 (VITAMIN B-12) 1,000 mcg tablet Take 1 tablet (1,000 mcg total) by mouth daily 90 tablet 1   • Blood Glucose Monitoring Suppl (OneTouch Verio Reflect) w/Device KIT May substitute brand based on insurance coverage. Check glucose TID. (Patient not taking: Reported on 5/1/2025) 1 kit 0   • Continuous Glucose Sensor (FreeStyle Jaquelin 3 Sensor) MISC Use 1 each every 14 (fourteen) days (Patient not taking: Reported on 5/1/2025) 6 each 3   • glucose blood (OneTouch Verio) test strip May substitute brand based on insurance coverage. Check glucose TID. (Patient not taking: Reported on 5/1/2025) 100 each 0   • Insulin Pen Needle 29G X 10MM MISC Use once a week (Patient not taking: Reported on 3/17/2025) 13 each 1   • OneTouch Delica Lancets 33G MISC May substitute brand based on insurance coverage. Check glucose TID. (Patient not taking: Reported on 4/28/2025) 100 each 0   • UltiCare Mini Pen Needles 31G X 6 MM MISC  (Patient not taking: Reported on 9/16/2024)     • verapamil (Verelan) 240 MG 24 hr capsule take 1 capsule by mouth daily at bedtime (Patient not taking: Reported on 5/1/2025) 90 capsule 1     No current facility-administered medications for this visit.     Current Outpatient Medications on File Prior to Visit   Medication Sig   • Alcohol Swabs 70 % PADS May substitute brand based on insurance coverage. Check  glucose TID.   • glipiZIDE (GLUCOTROL) 5 mg tablet take 1 tablet by mouth with breakfast   • Insulin Pen Needle (BD Pen Needle Dayna 2nd Gen) 32G X 4 MM MISC For use with insulin pen. Pharmacy may dispense brand covered by insurance.   • losartan-hydrochlorothiazide (HYZAAR) 100-25 MG per tablet take 1 tablet by mouth once daily   • rosuvastatin (CRESTOR) 10 MG tablet take 1 tablet by mouth once daily   • Tirzepatide (Mounjaro) 2.5 MG/0.5ML SOAJ Inject 2.5 mg under the skin once a week   • vitamin B-12 (VITAMIN B-12) 1,000 mcg tablet Take 1 tablet (1,000 mcg total) by mouth daily   • [DISCONTINUED] Continuous Glucose Sensor (Dexcom G7 Sensor) Use 1 Device every 10 days   • [DISCONTINUED] Insulin Glargine Solostar (Lantus SoloStar) 100 UNIT/ML SOPN Inject 0.25 mL (25 Units total) under the skin daily at bedtime   • Blood Glucose Monitoring Suppl (OneTouch Verio Reflect) w/Device KIT May substitute brand based on insurance coverage. Check glucose TID. (Patient not taking: Reported on 5/1/2025)   • Continuous Glucose Sensor (FreeStyle Jaquelin 3 Sensor) MISC Use 1 each every 14 (fourteen) days (Patient not taking: Reported on 5/1/2025)   • glucose blood (OneTouch Verio) test strip May substitute brand based on insurance coverage. Check glucose TID. (Patient not taking: Reported on 5/1/2025)   • Insulin Pen Needle 29G X 10MM MISC Use once a week (Patient not taking: Reported on 3/17/2025)   • OneTouch Delica Lancets 33G MISC May substitute brand based on insurance coverage. Check glucose TID. (Patient not taking: Reported on 4/28/2025)   • UltiCare Mini Pen Needles 31G X 6 MM MISC  (Patient not taking: Reported on 9/16/2024)   • verapamil (Verelan) 240 MG 24 hr capsule take 1 capsule by mouth daily at bedtime (Patient not taking: Reported on 5/1/2025)     No current facility-administered medications on file prior to visit.     She is allergic to metformin and sulfa antibiotics..    Review of Systems   Constitutional:   "Negative for chills and fever.   HENT:  Negative for congestion, ear pain and sore throat.    Eyes:  Negative for pain.   Respiratory:  Negative for cough and shortness of breath.    Cardiovascular:  Negative for chest pain and leg swelling.   Gastrointestinal:  Negative for abdominal pain, nausea and vomiting.   Endocrine: Negative for polyuria.   Genitourinary:  Negative for difficulty urinating, frequency and urgency.   Musculoskeletal:  Positive for arthralgias. Negative for back pain.   Skin:  Negative for rash.   Neurological:  Negative for weakness and headaches.   Psychiatric/Behavioral:  Negative for sleep disturbance. The patient is not nervous/anxious.          Objective:      /82 (BP Location: Left arm, Patient Position: Sitting, Cuff Size: Standard)   Pulse 84   Temp 98.6 °F (37 °C) (Temporal)   Ht 4' 10\" (1.473 m)   Wt 82.1 kg (181 lb)   SpO2 98%   BMI 37.83 kg/m²     Recent Results (from the past 8 weeks)   Comprehensive metabolic panel    Collection Time: 03/14/25 10:42 AM   Result Value Ref Range    Glucose, Random 173 (H) 65 - 99 mg/dL    BUN 12 7 - 25 mg/dL    Creatinine 0.64 0.40 - 1.10 mg/dL    Sodium 140 135 - 145 mmol/L    Potassium 3.8 3.5 - 5.2 mmol/L    Chloride 101 100 - 109 mmol/L    CO2 30 21 - 31 mmol/L    Calcium 9.7 8.5 - 10.5 mg/dL    Alkaline Phosphatase 65 35 - 120 U/L    Albumin 4.0 3.5 - 5.7 g/dL    TOTAL BILIRUBIN 0.6 0.2 - 1.0 mg/dL    Protein, Total 6.9 6.3 - 8.3 g/dL    AST 14 <41 U/L    ALT 10 <56 U/L    ANION GAP 9 3 - 11    eGFR 97 >59    Comment (Note)    Lipid Panel with Direct LDL reflex    Collection Time: 03/14/25 10:42 AM   Result Value Ref Range    Cholesterol, Total 169 <200 mg/dL    Triglycerides 108 <150 mg/dL    HDL Cholesterol 46 23 - 92 mg/dL    Non HDL Chol. (LDL+VLDL) 123 <160 mg/dL    Chol HDLC Ratio 3.7     LDL Calculated 101 <130 mg/dL   TSH, 3rd generation    Collection Time: 03/14/25 10:42 AM   Result Value Ref Range    TSH 1.23 0.45 - 5.33 " uIU/mL   Albumin / creatinine urine ratio    Collection Time: 03/14/25 10:42 AM   Result Value Ref Range    Creatinine(Crt),U 105.7 50.0 - 200.0 mg/dL    Albumin,U,Random 1.3 <3.0 mg/dL    Microalb/Creat Ratio 12.3 <30.0 mg/gm CREA   Hemoglobin A1C    Collection Time: 03/14/25 10:42 AM   Result Value Ref Range    Hemoglobin A1C 8.7 (H) <5.7 %    Estimated Average Glucose 203 mg/dL   CBC and differential    Collection Time: 03/14/25 10:42 AM   Result Value Ref Range    Hemoglobin 13.3 11.5 - 14.5 g/dL    HCT 39.7 35.0 - 43.0 %    White Blood Cell Count 5.7 4.0 - 10.0 thou/cmm    Red Blood Cell Count 4.55 3.70 - 4.70 mill/cmm    Platelet Count 208 140 - 350 thou/cmm    SL AMB MPV 7.7 7.5 - 11.3 fL    MCV 87 80 - 100 fL    MCH 29.2 26.0 - 34.0 pg    MCHC 33.5 32.0 - 37.0 g/dL    RDW 13.8 12.0 - 16.0 %    Differential Type AUTO     Neutrophils (Absolute) 3.2 1.8 - 7.8 thou/cmm    Lymphocytes (Absolute) 1.9 1.0 - 3.0 thou/cmm    Monocytes (Absolute) 0.5 0.3 - 1.0 thou/cmm    Eosinophils (Absolute) 0.1 0.0 - 0.5 thou/cmm    Basophils ABS 0.0 0.0 - 0.1 thou/cmm    Neutrophils 55 %    Lymphocytes 34 %    Monocytes 9 %    Eosinophils 1 %    Basophils PCT 1 %        Physical Exam  Constitutional:       Appearance: Normal appearance.   HENT:      Head: Normocephalic.      Right Ear: Tympanic membrane, ear canal and external ear normal.      Left Ear: Tympanic membrane, ear canal and external ear normal.      Nose: Nose normal. No congestion.      Mouth/Throat:      Mouth: Mucous membranes are moist.      Pharynx: Oropharynx is clear. No oropharyngeal exudate or posterior oropharyngeal erythema.   Eyes:      Extraocular Movements: Extraocular movements intact.      Conjunctiva/sclera: Conjunctivae normal.   Cardiovascular:      Rate and Rhythm: Normal rate and regular rhythm.      Heart sounds: Normal heart sounds. No murmur heard.  Pulmonary:      Effort: Pulmonary effort is normal.      Breath sounds: Normal breath sounds. No  wheezing or rales.   Abdominal:      General: Abdomen is flat. There is no distension.      Palpations: Abdomen is soft.      Tenderness: There is no abdominal tenderness.   Musculoskeletal:      Cervical back: Normal range of motion and neck supple.      Right lower leg: No edema.      Left lower leg: No edema.   Lymphadenopathy:      Cervical: No cervical adenopathy.   Skin:     General: Skin is warm.   Neurological:      General: No focal deficit present.      Mental Status: She is alert and oriented to person, place, and time.

## 2025-05-01 NOTE — PATIENT INSTRUCTIONS
"Patient Education     Routine physical for adults   The Basics   Written by the doctors and editors at [facility]   What is a physical? -- A physical is a routine visit, or \"check-up,\" with your doctor. You might also hear it called a \"wellness visit\" or \"preventive visit.\"  During each visit, the doctor will:   Ask about your physical and mental health   Ask about your habits, behaviors, and lifestyle   Do an exam   Give you vaccines if needed   Talk to you about any medicines you take   Give advice about your health   Answer your questions  Getting regular check-ups is an important part of taking care of your health. It can help your doctor find and treat any problems you have. But it's also important for preventing health problems.  A routine physical is different from a \"sick visit.\" A sick visit is when you see a doctor because of a health concern or problem. Since physicals are scheduled ahead of time, you can think about what you want to ask the doctor.  How often should I get a physical? -- It depends on your age and health. In general, for people age 21 years and older:   If you are younger than 50 years, you might be able to get a physical every 3 years.   If you are 50 years or older, your doctor might recommend a physical every year.  If you have an ongoing health condition, like diabetes or high blood pressure, your doctor will probably want to see you more often.  What happens during a physical? -- In general, each visit will include:   Physical exam - The doctor or nurse will check your height, weight, heart rate, and blood pressure. They will also look at your eyes and ears. They will ask about how you are feeling and whether you have any symptoms that bother you.   Medicines - It's a good idea to bring a list of all the medicines you take to each doctor visit. Your doctor will talk to you about your medicines and answer any questions. Tell them if you are having any side effects that bother you. You " "should also tell them if you are having trouble paying for any of your medicines.   Habits and behaviors - This includes:   Your diet   Your exercise habits   Whether you smoke, drink alcohol, or use drugs   Whether you are sexually active   Whether you feel safe at home  Your doctor will talk to you about things you can do to improve your health and lower your risk of health problems. They will also offer help and support. For example, if you want to quit smoking, they can give you advice and might prescribe medicines. If you want to improve your diet or get more physical activity, they can help you with this, too.   Lab tests, if needed - The tests you get will depend on your age and situation. For example, your doctor might want to check your:   Cholesterol   Blood sugar   Iron level   Vaccines - The recommended vaccines will depend on your age, health, and what vaccines you already had. Vaccines are very important because they can prevent certain serious or deadly infections.   Discussion of screening - \"Screening\" means checking for diseases or other health problems before they cause symptoms. Your doctor can recommend screening based on your age, risk, and preferences. This might include tests to check for:   Cancer, such as breast, prostate, cervical, ovarian, colorectal, prostate, lung, or skin cancer   Sexually transmitted infections, such as chlamydia and gonorrhea   Mental health conditions like depression and anxiety  Your doctor will talk to you about the different types of screening tests. They can help you decide which screenings to have. They can also explain what the results might mean.   Answering questions - The physical is a good time to ask the doctor or nurse questions about your health. If needed, they can refer you to other doctors or specialists, too.  Adults older than 65 years often need other care, too. As you get older, your doctor will talk to you about:   How to prevent falling at " home   Hearing or vision tests   Memory testing   How to take your medicines safely   Making sure that you have the help and support you need at home  All topics are updated as new evidence becomes available and our peer review process is complete.  This topic retrieved from [employer] on: May 02, 2024.  Topic 342355 Version 1.0  Release: 32.4.3 - C32.122  © 2024 UpToDate, Inc. and/or its affiliates. All rights reserved.  Consumer Information Use and Disclaimer   Disclaimer: This generalized information is a limited summary of diagnosis, treatment, and/or medication information. It is not meant to be comprehensive and should be used as a tool to help the user understand and/or assess potential diagnostic and treatment options. It does NOT include all information about conditions, treatments, medications, side effects, or risks that may apply to a specific patient. It is not intended to be medical advice or a substitute for the medical advice, diagnosis, or treatment of a health care provider based on the health care provider's examination and assessment of a patient's specific and unique circumstances. Patients must speak with a health care provider for complete information about their health, medical questions, and treatment options, including any risks or benefits regarding use of medications. This information does not endorse any treatments or medications as safe, effective, or approved for treating a specific patient. UpToDate, Inc. and its affiliates disclaim any warranty or liability relating to this information or the use thereof.The use of this information is governed by the Terms of Use, available at https://www.[employer].uwer.com/en/know/clinical-effectiveness-terms. 2024© UpToDate, Inc. and its affiliates and/or licensors. All rights reserved.  Copyright   © 2024 UpToDate, Inc. and/or its affiliates. All rights reserved.

## 2025-05-05 ENCOUNTER — OFFICE VISIT (OUTPATIENT)
Dept: VASCULAR SURGERY | Facility: CLINIC | Age: 65
End: 2025-05-05
Payer: COMMERCIAL

## 2025-05-05 VITALS
SYSTOLIC BLOOD PRESSURE: 124 MMHG | BODY MASS INDEX: 38.5 KG/M2 | HEART RATE: 97 BPM | DIASTOLIC BLOOD PRESSURE: 88 MMHG | HEIGHT: 58 IN | WEIGHT: 183.4 LBS | OXYGEN SATURATION: 99 % | TEMPERATURE: 97.9 F

## 2025-05-05 DIAGNOSIS — I83.813 VARICOSE VEINS OF BOTH LOWER EXTREMITIES WITH PAIN: ICD-10-CM

## 2025-05-05 DIAGNOSIS — I83.819 VARICOSE VEINS WITH PAIN: Primary | ICD-10-CM

## 2025-05-05 PROCEDURE — 99204 OFFICE O/P NEW MOD 45 MIN: CPT | Performed by: SURGERY

## 2025-05-05 NOTE — PROGRESS NOTES
Name: Michelle Rosa      : 1960      MRN: [id number]  Encounter Provider: Hola Khalil DO  Encounter Date: 2025   Encounter department: [facility]  :  Assessment & Plan  Varicose veins with pain    Orders:    VAS Lower extremity venous insufficiency duplex, Single leg w/ measurements; Future    Varicose veins of both lower extremities with pain  Michelle is a 65-year-old woman who is accompanied to the office by her granddaughter for painful right lower leg varicosity.  She does have a cluster of spider/reticular veins along the right lateral ankle that we discussed is not amenable to phlebectomy.  She has worn compression stockings sporadically with minimal relief.  At this time I am recommending a formal venous insufficiency study with return office visit test results and possible treatment.             History of Present Illness   HPI  Michelle Rosa is a 65 y.o. female who presents to the office for painful right leg varicosities.      Patient presents as a new patient for varicose veins. Patient complains of swelling, bulging veins and discoloration towards the end of the day. Patient wears compression when she has assistance and elevation through out the day. Patient stated legs are making it hard for patient to walk.   History obtained from: patient and granddaughter    Review of Systems   Constitutional: Negative.    HENT: Negative.     Eyes: Negative.    Respiratory: Negative.     Cardiovascular:  Positive for leg swelling.   Endocrine: Negative.    Genitourinary: Negative.    Musculoskeletal:  Positive for gait problem.   Skin:  Positive for color change.   Allergic/Immunologic: Negative.    Hematological: Negative.    Psychiatric/Behavioral: Negative.       Past Medical History   Past Medical History:   Diagnosis Date    Diabetes mellitus (HCC)     Hypercholesterolemia     Hypertension     Rheumatoid arthritis (HCC)     Sarcoidosis     Sepsis (HCC) 2024     Past Surgical  History:   Procedure Laterality Date    CHOLECYSTECTOMY      COLONOSCOPY  05/15/2009    Abnormal     KNEE SURGERY Left 10/2022    MAMMO (HISTORICAL)  09/30/2016    Negative      Family History   Problem Relation Age of Onset    Diabetes Mother     Coronary artery disease Father     No Known Problems Sister     No Known Problems Sister     No Known Problems Sister     No Known Problems Daughter     No Known Problems Daughter     No Known Problems Daughter     Diabetes Maternal Grandmother     Stomach cancer Maternal Grandfather     No Known Problems Paternal Grandmother     No Known Problems Son     No Known Problems Son     No Known Problems Son     No Known Problems Maternal Aunt     No Known Problems Maternal Aunt     No Known Problems Maternal Aunt     No Known Problems Paternal Aunt     No Known Problems Paternal Aunt       reports that she has never smoked. She has never used smokeless tobacco. She reports that she does not currently use alcohol. She reports that she does not use drugs.  Current Outpatient Medications   Medication Instructions    Alcohol Swabs 70 % PADS May substitute brand based on insurance coverage. Check glucose TID.    Blood Glucose Monitoring Suppl (OneTouch Verio Reflect) w/Device KIT May substitute brand based on insurance coverage. Check glucose TID.    Continuous Glucose Sensor (Dexcom G7 Sensor) 1 Device, Does not apply, Every 10 days    Continuous Glucose Sensor (FreeStyle Jaquelin 3 Sensor) MISC 1 each, Does not apply, Every 14 days    glipiZIDE (GLUCOTROL) 5 mg, Oral, Daily    glucose blood (OneTouch Verio) test strip May substitute brand based on insurance coverage. Check glucose TID.    Insulin Glargine Solostar (LANTUS SOLOSTAR) 30 Units, Subcutaneous, Daily at bedtime    Insulin Pen Needle (BD Pen Needle Dayna 2nd Gen) 32G X 4 MM MISC For use with insulin pen. Pharmacy may dispense brand covered by insurance.    Insulin Pen Needle 29G X 10MM MISC Does not apply, Weekly     losartan-hydrochlorothiazide (HYZAAR) 100-25 MG per tablet 1 tablet, Oral, Daily    Mounjaro 2.5 mg, Subcutaneous, Weekly    OneTouch Delica Lancets 33G MISC May substitute brand based on insurance coverage. Check glucose TID.    rosuvastatin (CRESTOR) 10 mg, Oral, Daily    UltiCare Mini Pen Needles 31G X 6 MM MISC No dose, route, or frequency recorded.    verapamil (VERELAN) 240 mg, Oral, Daily at bedtime    vitamin B-12 (VITAMIN B-12) 1,000 mcg, Oral, Daily     Allergies   Allergen Reactions    Metformin Abdominal Pain    Sulfa Antibiotics       Current Outpatient Medications on File Prior to Visit   Medication Sig Dispense Refill    Alcohol Swabs 70 % PADS May substitute brand based on insurance coverage. Check glucose TID. 100 each 0    Continuous Glucose Sensor (Dexcom G7 Sensor) Use 1 Device every 10 days 9 each 3    glipiZIDE (GLUCOTROL) 5 mg tablet Take 1 tablet (5 mg total) by mouth in the morning 90 tablet 1    Insulin Glargine Solostar (Lantus SoloStar) 100 UNIT/ML SOPN Inject 0.3 mL (30 Units total) under the skin daily at bedtime 9 mL 2    Insulin Pen Needle (BD Pen Needle Dayna 2nd Gen) 32G X 4 MM MISC For use with insulin pen. Pharmacy may dispense brand covered by insurance. 100 each 0    losartan-hydrochlorothiazide (HYZAAR) 100-25 MG per tablet Take 1 tablet by mouth daily 90 tablet 1    rosuvastatin (CRESTOR) 10 MG tablet Take 1 tablet (10 mg total) by mouth daily 90 tablet 1    Tirzepatide (Mounjaro) 2.5 MG/0.5ML SOAJ Inject 2.5 mg under the skin once a week 2 mL 2    vitamin B-12 (VITAMIN B-12) 1,000 mcg tablet Take 1 tablet (1,000 mcg total) by mouth daily 90 tablet 1    Blood Glucose Monitoring Suppl (OneTouch Verio Reflect) w/Device KIT May substitute brand based on insurance coverage. Check glucose TID. (Patient not taking: Reported on 5/1/2025) 1 kit 0    Continuous Glucose Sensor (FreeStyle Jaquelin 3 Sensor) [facility] Use 1 each every 14 (fourteen) days (Patient not taking: Reported on 10/21/2024)  "6 each 3    glucose blood (OneTouch Verio) test strip May substitute brand based on insurance coverage. Check glucose TID. (Patient not taking: Reported on 10/21/2024) 100 each 0    Insulin Pen Needle 29G X 10MM MISC Use once a week (Patient not taking: Reported on 3/17/2025) 13 each 1    OneTouch Delica Lancets 33G MISC May substitute brand based on insurance coverage. Check glucose TID. (Patient not taking: Reported on 3/17/2025) 100 each 0    UltiCare Mini Pen Needles 31G X 6 MM MISC  (Patient not taking: Reported on 9/16/2024)      verapamil (Verelan) 240 MG 24 hr capsule take 1 capsule by mouth daily at bedtime (Patient not taking: Reported on 4/28/2025) 90 capsule 1     No current facility-administered medications on file prior to visit.      Social History     Tobacco Use    Smoking status: Never    Smokeless tobacco: Never   Vaping Use    Vaping status: Never Used   Substance and Sexual Activity    Alcohol use: Not Currently    Drug use: Never    Sexual activity: Yes     Partners: Male        Objective   /88 (BP Location: Left arm, Patient Position: Sitting, Cuff Size: Large)   Pulse 97   Temp 97.9 °F (36.6 °C) (Temporal)   Ht 4' 10\" (1.473 m)   Wt 83.2 kg (183 lb 6.4 oz)   SpO2 99%   BMI 38.33 kg/m²      Physical Exam  Constitutional:       General: She is not in acute distress.     Appearance: She is well-developed. She is not ill-appearing, toxic-appearing or diaphoretic.   HENT:      Head: Normocephalic and atraumatic.   Eyes:      General: No scleral icterus.     Conjunctiva/sclera: Conjunctivae normal.   Neck:      Trachea: No tracheal deviation.   Cardiovascular:      Rate and Rhythm: Normal rate.   Pulmonary:      Effort: Pulmonary effort is normal.   Abdominal:      Palpations: Abdomen is soft. Mass: no appreciable aortic pulsation/aneurysm.   Musculoskeletal:         General: Normal range of motion.      Cervical back: Normal range of motion and neck supple.   Skin:     General: Skin is " warm and dry.      Comments: Bulky right lower leg varicosities situated along the posterior calf and knee.  She does have a cluster of spider/reticular veins around the lateral malleolus/heel.   Neurological:      Mental Status: She is alert and oriented to person, place, and time.   Psychiatric:         Mood and Affect: Mood normal.         Behavior: Behavior normal.         Thought Content: Thought content normal.         Judgment: Judgment normal.         Administrative Statements   I have spent a total time of 30 minutes in caring for this patient on the day of the visit/encounter including Diagnostic results, Prognosis, Risks and benefits of tx options, Instructions for management, Patient and family education, Importance of tx compliance, Risk factor reductions, Impressions, Counseling / Coordination of care, Documenting in the medical record, Reviewing/placing orders in the medical record (including tests, medications, and/or procedures), and Obtaining or reviewing history  .

## 2025-05-05 NOTE — ASSESSMENT & PLAN NOTE
Michelle is a 65-year-old woman who is accompanied to the office by her granddaughter for painful right lower leg varicosity.  She does have a cluster of spider/reticular veins along the right lateral ankle that we discussed is not amenable to phlebectomy.  She has worn compression stockings sporadically with minimal relief.  At this time I am recommending a formal venous insufficiency study with return office visit test results and possible treatment.

## 2025-05-06 ENCOUNTER — HOSPITAL ENCOUNTER (OUTPATIENT)
Dept: NON INVASIVE DIAGNOSTICS | Facility: CLINIC | Age: 65
Discharge: HOME | End: 2025-05-06
Attending: SURGERY
Payer: COMMERCIAL

## 2025-05-06 DIAGNOSIS — I83.819 VARICOSE VEINS WITH PAIN: ICD-10-CM

## 2025-05-06 PROCEDURE — 93971 EXTREMITY STUDY: CPT

## 2025-05-07 PROCEDURE — 93971 EXTREMITY STUDY: CPT | Performed by: SURGERY

## 2025-05-09 ENCOUNTER — NURSE TRIAGE (OUTPATIENT)
Age: 65
End: 2025-05-09

## 2025-05-09 DIAGNOSIS — E11.65 UNCONTROLLED TYPE 2 DIABETES MELLITUS WITH HYPERGLYCEMIA (HCC): ICD-10-CM

## 2025-05-09 RX ORDER — ACYCLOVIR 400 MG/1
1 TABLET ORAL
Qty: 9 EACH | Refills: 3 | Status: SHIPPED | OUTPATIENT
Start: 2025-05-09

## 2025-05-09 NOTE — TELEPHONE ENCOUNTER
"FOLLOW UP: N/A    REASON FOR CONVERSATION: Medication Problem    SYMPTOMS: N/A    OTHER: Continuous Glucose Sensor (Dexcom G7 Sensor) sent to [facility] on [address] per patient request.     DISPOSITION: Home Care    Reason for Disposition   Prescription prescribed recently is not at pharmacy and triager has access to patient's EMR and prescription is recorded in the EMR    Answer Assessment - Initial Assessment Questions  1. NAME of MEDICINE: \"What medicine(s) are you calling about?\"      Continuous Glucose Sensor (Dexcom G7 Sensor)  2. QUESTION: \"What is your question?\" (e.g., double dose of medicine, side effect)      Send to [facility] on [address]  3. PRESCRIBER: \"Who prescribed the medicine?\" Reason: if prescribed by specialist, call should be referred to that group.      Fausto Jimenez    Protocols used: Medication Question Call-Adult-[place]    "

## 2025-06-16 ENCOUNTER — OFFICE VISIT (OUTPATIENT)
Dept: INTERNAL MEDICINE CLINIC | Facility: CLINIC | Age: 65
End: 2025-06-16
Payer: COMMERCIAL

## 2025-06-16 ENCOUNTER — CONSULT (OUTPATIENT)
Dept: ENDOCRINOLOGY | Facility: CLINIC | Age: 65
End: 2025-06-16
Payer: COMMERCIAL

## 2025-06-16 VITALS
BODY MASS INDEX: 38.79 KG/M2 | SYSTOLIC BLOOD PRESSURE: 138 MMHG | HEIGHT: 58 IN | TEMPERATURE: 98.5 F | WEIGHT: 184.8 LBS | OXYGEN SATURATION: 97 % | DIASTOLIC BLOOD PRESSURE: 88 MMHG | HEART RATE: 99 BPM

## 2025-06-16 VITALS
HEIGHT: 58 IN | OXYGEN SATURATION: 98 % | DIASTOLIC BLOOD PRESSURE: 82 MMHG | BODY MASS INDEX: 38.83 KG/M2 | SYSTOLIC BLOOD PRESSURE: 132 MMHG | HEART RATE: 112 BPM | WEIGHT: 185 LBS

## 2025-06-16 DIAGNOSIS — N39.0 UTI (URINARY TRACT INFECTION): ICD-10-CM

## 2025-06-16 DIAGNOSIS — E78.2 MIXED HYPERLIPIDEMIA: ICD-10-CM

## 2025-06-16 DIAGNOSIS — N30.00 ACUTE CYSTITIS WITHOUT HEMATURIA: Primary | ICD-10-CM

## 2025-06-16 DIAGNOSIS — E11.65 UNCONTROLLED TYPE 2 DIABETES MELLITUS WITH HYPERGLYCEMIA (HCC): ICD-10-CM

## 2025-06-16 DIAGNOSIS — I10 ESSENTIAL HYPERTENSION, BENIGN: ICD-10-CM

## 2025-06-16 DIAGNOSIS — N39.0 URINARY TRACT INFECTION, SITE NOT SPECIFIED: ICD-10-CM

## 2025-06-16 DIAGNOSIS — R05.1 ACUTE COUGH: ICD-10-CM

## 2025-06-16 DIAGNOSIS — E55.9 VITAMIN D DEFICIENCY: ICD-10-CM

## 2025-06-16 DIAGNOSIS — R35.0 URINARY FREQUENCY: Primary | ICD-10-CM

## 2025-06-16 DIAGNOSIS — E04.9 THYROID ENLARGEMENT: Primary | ICD-10-CM

## 2025-06-16 LAB
SL AMB  POCT GLUCOSE, UA: NORMAL
SL AMB LEUKOCYTE ESTERASE,UA: NEGATIVE
SL AMB POCT BILIRUBIN,UA: NEGATIVE
SL AMB POCT BLOOD,UA: NEGATIVE
SL AMB POCT CLARITY,UA: CLEAR
SL AMB POCT COLOR,UA: NORMAL
SL AMB POCT KETONES,UA: NEGATIVE
SL AMB POCT NITRITE,UA: NEGATIVE
SL AMB POCT PH,UA: 6
SL AMB POCT SPECIFIC GRAVITY,UA: 1.01
SL AMB POCT URINE PROTEIN: NEGATIVE
SL AMB POCT UROBILINOGEN: NEGATIVE

## 2025-06-16 PROCEDURE — 99205 OFFICE O/P NEW HI 60 MIN: CPT | Performed by: INTERNAL MEDICINE

## 2025-06-16 PROCEDURE — G2211 COMPLEX E/M VISIT ADD ON: HCPCS | Performed by: INTERNAL MEDICINE

## 2025-06-16 PROCEDURE — 81002 URINALYSIS NONAUTO W/O SCOPE: CPT | Performed by: INTERNAL MEDICINE

## 2025-06-16 PROCEDURE — 95251 CONT GLUC MNTR ANALYSIS I&R: CPT | Performed by: INTERNAL MEDICINE

## 2025-06-16 PROCEDURE — 99213 OFFICE O/P EST LOW 20 MIN: CPT | Performed by: INTERNAL MEDICINE

## 2025-06-16 RX ORDER — INSULIN ASPART 100 [IU]/ML
INJECTION, SOLUTION INTRAVENOUS; SUBCUTANEOUS
Qty: 15 ML | Refills: 1 | Status: SHIPPED | OUTPATIENT
Start: 2025-06-16

## 2025-06-16 RX ORDER — INSULIN GLARGINE 100 [IU]/ML
30 INJECTION, SOLUTION SUBCUTANEOUS
Qty: 9 ML | Refills: 2 | Status: SHIPPED | OUTPATIENT
Start: 2025-06-16

## 2025-06-16 RX ORDER — BENZONATATE 100 MG/1
100 CAPSULE ORAL 3 TIMES DAILY PRN
Qty: 20 CAPSULE | Refills: 0 | Status: SHIPPED | OUTPATIENT
Start: 2025-06-16

## 2025-06-16 RX ORDER — CEPHALEXIN 500 MG/1
500 CAPSULE ORAL EVERY 6 HOURS SCHEDULED
Qty: 28 CAPSULE | Refills: 0 | Status: SHIPPED | OUTPATIENT
Start: 2025-06-16 | End: 2025-06-23

## 2025-06-16 RX ORDER — TIRZEPATIDE 2.5 MG/.5ML
2.5 INJECTION, SOLUTION SUBCUTANEOUS WEEKLY
Qty: 2 ML | Refills: 2 | Status: SHIPPED | OUTPATIENT
Start: 2025-06-16

## 2025-06-16 NOTE — PROGRESS NOTES
Assessment/Plan:  Visit  for  cough  and  UTI symptoms  Cough  and  UTI  syptoms     Diagnoses and all orders for this visit:    Acute cystitis without hematuria  -     cephalexin (KEFLEX) 500 mg capsule; Take 1 capsule (500 mg total) by mouth every 6 (six) hours for 7 days    Uncontrolled type 2 diabetes mellitus with hyperglycemia (HCC)  -     Tirzepatide (Mounjaro) 2.5 MG/0.5ML SOAJ; Inject 2.5 mg under the skin once a week    Urinary tract infection, site not specified    UTI (urinary tract infection)  -     Urine culture    Acute cough  -     benzonatate (TESSALON PERLES) 100 mg capsule; Take 1 capsule (100 mg total) by mouth 3 (three) times a day as needed for cough          Subjective: Cough  and  UTI  symptosm           Patient ID: Michelle Rosa is a 65 y.o. female.    Urinary Tract Infection   Pertinent negatives include no frequency or vomiting.       The following portions of the patient's history were reviewed and updated as appropriate: allergies, current medications, past family history, past medical history, past social history, past surgical history, and problem list.    Review of Systems   Constitutional: Negative.    HENT:  Negative for dental problem, drooling, ear discharge and ear pain.    Eyes:  Negative for discharge, redness and itching.   Respiratory:  Positive for cough. Negative for apnea and wheezing.    Cardiovascular:  Negative for chest pain and palpitations.   Gastrointestinal:  Negative for abdominal pain, blood in stool, diarrhea and vomiting.   Endocrine: Negative for polydipsia, polyphagia and polyuria.   Genitourinary:  Positive for dysuria. Negative for decreased urine volume and frequency.   Musculoskeletal:  Negative for arthralgias, myalgias and neck stiffness.   Skin:  Negative for pallor and wound.   Allergic/Immunologic: Negative for environmental allergies and food allergies.   Neurological:  Negative for facial asymmetry, light-headedness, numbness and headaches.  "  Hematological:  Negative for adenopathy. Does not bruise/bleed easily.   Psychiatric/Behavioral:  Negative for agitation, behavioral problems and confusion.          Objective:      /88 (BP Location: Left arm, Patient Position: Sitting, Cuff Size: Standard)   Pulse 99   Temp 98.5 °F (36.9 °C) (Temporal)   Ht 4' 10\" (1.473 m)   Wt 83.8 kg (184 lb 12.8 oz)   SpO2 97%   BMI 38.62 kg/m²          Physical Exam  Constitutional:       Appearance: Normal appearance. She is obese.   HENT:      Head: Normocephalic.      Nose: Nose normal.      Mouth/Throat:      Mouth: Mucous membranes are moist.     Eyes:      Pupils: Pupils are equal, round, and reactive to light.       Cardiovascular:      Rate and Rhythm: Regular rhythm.      Heart sounds: Normal heart sounds.   Pulmonary:      Breath sounds: Normal breath sounds.   Abdominal:      Palpations: Abdomen is soft.     Musculoskeletal:         General: No swelling.      Cervical back: Neck supple.     Skin:     General: Skin is warm.     Neurological:      General: No focal deficit present.      Mental Status: She is alert and oriented to person, place, and time.     Psychiatric:         Mood and Affect: Mood normal.        UTI   Keflex  500mg  q 6hrs   and  Tessalon perles  for the  cough  100mg  q 8hrs   Fup  if  not  better  in  3  days.      Marco Antonio MALIK MD.FACP        Plan:  Plan:                  Objective:     General: normal   Abdomen:    Back: Pain on  percussion   U: normal          "

## 2025-06-16 NOTE — ASSESSMENT & PLAN NOTE
Lab Results   Component Value Date    HGBA1C 8.7 (H) 03/14/2025     Blood glucose levels are consistently above 180, with an average of 312.  Will discontinue glipizide as blood sugars are elevated after meals, will start NovoLog 10 units with meals, starting with breakfast and dinner  -Increase Lantus to 30 units at bedtime  - Cholesterol levels are within normal limits, but A1c is elevated.  - Discussion on the benefits of continuous glucose monitor sensor for identifying dietary triggers for hyperglycemia and adjusting diet and insulin accordingly.  - Continue Mounjaro 2.5 mg once a week. Prescription for NovoLog FlexPen, 10 units with breakfast and 10 units with dinner, sent to pharmacy. Increase Lantus glargine dosage to 30 units at night. Maintain a small lunch to keep blood sugar stable. Continue using continuous glucose monitor sensor. Inform if severe nausea and vomiting occur for potential discontinuation of Mounjaro.  - Educated about hypoglycemia symptoms and treatment  - Educated about long-term complication of uncontrolled diabetes including risk of heart attack, stroke, nephropathy, neuropathy and retinopathy blood pressure well-controlled, continue current management as per PCP     Suspected urinary tract infection (UTI).  - Experiencing lower back pain and side pain.  - No proteins in the urine.  - Advised to contact primary care physician for a urine test and potential antibiotic treatment. If unable to secure an appointment with PCP, seek immediate attention at an urgent care facility.  Orders:    NovoLOG FlexPen 100 units/mL injection pen; Inject 10 units with breakfast and 10 units with dinner    Insulin Glargine Solostar (Lantus SoloStar) 100 UNIT/ML SOPN; Inject 0.3 mL (30 Units total) under the skin daily at bedtime    Basic metabolic panel; Future    Hemoglobin A1C; Future    Albumin / creatinine urine ratio; Future    
  Lab Results   Component Value Date    LDLCALC 101 03/14/2025   Continue statins as per PCP         
Blood pressure well-controlled, continue current management as per PCP       
Continue vitamin D3 supplementation 2000 IU daily       
03-Aug-2021 22:00

## 2025-06-16 NOTE — PROGRESS NOTES
Name: Michelle Rosa      : 1960      MRN: [id number]  Encounter Provider: Niharika Barakat MD  Encounter Date: 2025   Encounter department: [facility] [place]      :  Assessment & Plan  Uncontrolled type 2 diabetes mellitus with hyperglycemia (HCC)    Lab Results   Component Value Date    HGBA1C 8.7 (H) 2025     Blood glucose levels are consistently above 180, with an average of 312.  Will discontinue glipizide as blood sugars are elevated after meals, will start NovoLog 10 units with meals, starting with breakfast and dinner  -Increase Lantus to 30 units at bedtime  - Cholesterol levels are within normal limits, but A1c is elevated.  - Discussion on the benefits of continuous glucose monitor sensor for identifying dietary triggers for hyperglycemia and adjusting diet and insulin accordingly.  - Continue Mounjaro 2.5 mg once a week. Prescription for NovoLog FlexPen, 10 units with breakfast and 10 units with dinner, sent to pharmacy. Increase Lantus glargine dosage to 30 units at night. Maintain a small lunch to keep blood sugar stable. Continue using continuous glucose monitor sensor. Inform if severe nausea and vomiting occur for potential discontinuation of Mounjaro.  - Educated about hypoglycemia symptoms and treatment  - Educated about long-term complication of uncontrolled diabetes including risk of heart attack, stroke, nephropathy, neuropathy and retinopathy blood pressure well-controlled, continue current management as per PCP     Suspected urinary tract infection (UTI).  - Experiencing lower back pain and side pain.  - No proteins in the urine.  - Advised to contact primary care physician for a urine test and potential antibiotic treatment. If unable to secure an appointment with PCP, seek immediate attention at an urgent care facility.  Orders:    NovoLOG FlexPen 100 units/mL injection pen; Inject 10 units with breakfast and 10 units with dinner     Insulin Glargine Solostar (Lantus SoloStar) 100 UNIT/ML SOPN; Inject 0.3 mL (30 Units total) under the skin daily at bedtime    Basic metabolic panel; Future    Hemoglobin A1C; Future    Albumin / creatinine urine ratio; Future    Vitamin D deficiency  Continue vitamin D3 supplementation 2000 IU daily       Essential hypertension, benign  Blood pressure well-controlled, continue current management as per PCP       Mixed hyperlipidemia    Lab Results   Component Value Date    LDLCALC 101 03/14/2025   Continue statins as per PCP         Thyroid enlargement  - Bump felt on thyroid during physical examination.  - Thyroid ultrasound will be ordered to further evaluate the nodule.  Orders:    US thyroid; Future      Assessment & Plan            Follow-up: The patient will follow up in 3 to 4 months.            History of Present Illness   History of Present Illness  The patient is a 65-year-old female who presents via virtual visit for evaluation of diabetes mellitus.    She has been managing diabetes for over 3 years. Her current regimen includes glargine 25 units at bedtime, glipizide 5 mg in the morning, and Mounjaro 2.5 mg once a week. Mounjaro was introduced a few months ago as a replacement for Trulicity,  She experienced an episode of elevated blood glucose levels during a hospital stay due to a urinary tract infection (UTI), which led to the switch in medication. She reports experiencing nausea and vomiting when the Mounjaro dosage was increased to 5 mg, but these symptoms have improved since the reduction to 2.5 mg. Blood glucose levels are monitored using the Dexcom arin. A recent steroid injection, administered approximately a month ago, resulted in a temporary increase in her glargine dosage to 30 units for a week.    She has recently consulted with an ophthalmologist. There is no history of thyroid issues. Metformin was previously discontinued due to abdominal pain.    She is currently awaiting a response from  her primary care physician regarding a urine test order, as she suspects a possible UTI. She is experiencing lower back pain and side pain but reports no difficulty urinating or burning sensation. She had a fever on Saturday night but reports no diarrhea.  Patient is discontinued glucose monitor sensor by Dexcom clarity  2 weeks ago review from Irene 3, 2025 to June 16, 2025 reviewed  More than 72 hours of data reviewed.  Average glucose is 312 mg per DL, GMI is 10.8%, standard deviation is 64 mg per DL, coefficient of variation is 20.4%  2% blood sugars are in target range, 17% blood sugars are high, 81% blood sugars are very high, 0% blood sugars are low, 0% blood sugars are very low  Interpretation of data-patient has elevated blood sugars overall from 12pm  to 12 midnight and again from 12 midnight to 12 PM      Current diabetic regimen:      Latest Reference Range & Units 03/14/25 10:42   EXT Creatinine Urine 50.0 - 200.0 mg/dL 105.7   Albumin Creat Ratio <30.0 mg/gm CREA 12.3   Albumin,U,Random <3.0 mg/dL 1.3        Latest Reference Range & Units 03/14/25 10:42   Sodium 135 - 145 mmol/L 140   Potassium 3.5 - 5.2 mmol/L 3.8   Chloride 100 - 109 mmol/L 101   Carbon Dioxide 21 - 31 mmol/L 30   ANION GAP 3 - 11  9   BUN 7 - 25 mg/dL 12   Creatinine 0.40 - 1.10 mg/dL 0.64   GLUCOSE 65 - 99 mg/dL 173 (H)   Calcium 8.5 - 10.5 mg/dL 9.7   AST <41 U/L 14   ALT <56 U/L 10   ALK PHOS 35 - 120 U/L 65   Total Protein 6.3 - 8.3 g/dL 6.9   Albumin 3.5 - 5.7 g/dL 4.0   Total Bilirubin 0.2 - 1.0 mg/dL 0.6   GFR, Calculated >59  97   Cholesterol <200 mg/dL 169   Triglycerides <150 mg/dL 108   HDL CHOLESTEROL LIPOPROTEIN 23 - 92 mg/dL 46   LDL Calculated <130 mg/dL 101   NON HDL CHOL. (LDL+VLDL) <160 mg/dL 123   Chol/HDL Ratio  3.7     Review of Systems   Constitutional:  Negative for activity change, diaphoresis, fatigue, fever and unexpected weight change.   HENT: Negative.     Eyes:  Negative for visual disturbance.  "  Respiratory:  Negative for cough, chest tightness and shortness of breath.    Cardiovascular:  Negative for chest pain, palpitations and leg swelling.   Gastrointestinal:  Negative for abdominal pain, blood in stool, constipation, diarrhea, nausea and vomiting.   Endocrine: Negative for cold intolerance, heat intolerance, polydipsia, polyphagia and polyuria.   Genitourinary:  Negative for dysuria, enuresis, frequency and urgency.   Musculoskeletal:  Negative for arthralgias and myalgias.   Skin:  Negative for pallor, rash and wound.   Allergic/Immunologic: Negative.    Neurological:  Negative for dizziness, tremors, weakness and numbness.   Hematological: Negative.    Psychiatric/Behavioral: Negative.      as per HPI    Social History[1]     Medical History Reviewed by provider this encounter:     .    Objective   /82 (BP Location: Left arm, Patient Position: Sitting, Cuff Size: Large)   Pulse (!) 112   Ht 4' 10\" (1.473 m)   Wt 83.9 kg (185 lb)   SpO2 98%   BMI 38.67 kg/m²      Body mass index is 38.67 kg/m².  Wt Readings from Last 3 Encounters:   06/16/25 83.9 kg (185 lb)   05/05/25 83.2 kg (183 lb 6.4 oz)   05/01/25 82.1 kg (181 lb)     Physical Exam  Head and Neck: Palpable bump on the thyroid.  Physical Exam  Constitutional:       Appearance: Normal appearance.     Cardiovascular:      Rate and Rhythm: Normal rate and regular rhythm.   Pulmonary:      Effort: Pulmonary effort is normal.      Breath sounds: Normal breath sounds.     Musculoskeletal:         General: Normal range of motion.      Cervical back: Normal range of motion and neck supple.      Right lower leg: No edema.      Left lower leg: No edema.     Neurological:      General: No focal deficit present.      Mental Status: She is alert and oriented to person, place, and time.     Psychiatric:         Mood and Affect: Mood normal.         Behavior: Behavior normal.       Last Eye Exam: 06/19/2024  Last Foot Exam: 03/17/2025  Health " Maintenance   Topic Date Due    Diabetic Foot Exam  03/17/2026    Diabetic Eye Exam  06/19/2026       Results  Labs:  - Cholesterol: 169 mg/dL  - LDL: 101 mg/dL  - Calcium: Normal  - Kidney function: Normal  - Sodium: Normal  - Potassium: Normal  - A1c: High  - Proteins in urine: None  - Thyroid: Normal (03/2023)    Continuous Glucose Monitoring (CGM):  - Time Above Range (TAR): All blood sugars are above 180 mg/dL  - Mean glucose: 312 mg/dL  Labs: I have reviewed pertinent labs including:   Lab Results   Component Value Date    HGBA1C 8.7 (H) 03/14/2025    HGBA1C 9.3 (H) 10/17/2024    HGBA1C 9.7 (H) 09/08/2024      Lab Results   Component Value Date    CREATININE 0.64 03/14/2025    CREATININE 0.57 10/17/2024    CREATININE 0.63 09/11/2024    BUN 12 03/14/2025     10/20/2015    K 3.8 03/14/2025     03/14/2025    CO2 30 03/14/2025      GFR, Calculated   Date Value Ref Range Status   10/26/2019 78 >60 mL/min/1.73m2 Final     Comment:     mL/min per 1.73 square meters                                            Normal Function or Mild Renal    Disease (if clinically at risk):  >or=60  Moderately Decreased:                30-59  Severely Decreased:                  15-29  Renal Failure:                         <15                                            -American GFR: multiply reported GFR by 1.16    Please note that the eGFR is based on the CKD-EPI calculation, and is not intended to be used for drug dosing.                                            Note: Calculated GFR may not be an accurate indicator of renal function if the patient's renal function is not in a steady state.     eGFRcr   Date Value Ref Range Status   12/08/2023 98 >59 Final     eGFR   Date Value Ref Range Status   03/14/2025 97 >59 Final   09/11/2024 94 ml/min/1.73sq m Final      Cholesterol   Date Value Ref Range Status   10/20/2015 195 mg/dL Final     Comment:     CHOLESTEROL:       Desirable        <200 mg/dl       Borderline  "High  200-239 mg/dl       High             >239 mg/dl  ____________________________________       HDL   Date Value Ref Range Status   10/20/2015 50 mg/dL Final     Comment:     HDL:       High       >59 mg/dl       Low        <41 mg/dl  ______________________________       HDL Cholesterol   Date Value Ref Range Status   03/14/2025 46 23 - 92 mg/dL Final     Triglycerides   Date Value Ref Range Status   03/14/2025 108 <150 mg/dL Final      ALT   Date Value Ref Range Status   03/14/2025 10 <56 U/L Final   12/08/2023 15 <56 U/L Final     AST   Date Value Ref Range Status   03/14/2025 14 <41 U/L Final   12/08/2023 16 <41 U/L Final     Alkaline Phosphatase   Date Value Ref Range Status   03/14/2025 65 35 - 120 U/L Final   12/08/2023 64 35 - 120 U/L Final     Total Bilirubin   Date Value Ref Range Status   10/20/2015 0.47 0.20 - 1.00 mg/dL Final      Lab Results   Component Value Date    QWU2UJXGJGTS 0.956 10/20/2015      No results found for: \"FREET4\", \"T3FREE\", \"TSI\", \"ANTITPOAB\"   Lab Results   Component Value Date    QHFO09GXVCCF 22.5 (L) 01/23/2015            Discussed with the patient and all questioned fully answered. She will call me if any problems arise.  I have spent a total time of 55  minutes in caring for this patient on the day of the visit/encounter including Diagnostic results, Prognosis, Risks and benefits of tx options, Instructions for management, Patient and family education, Importance of tx compliance, Risk factor reductions, Impressions, Counseling / Coordination of care, Documenting in the medical record, Reviewing/placing orders in the medical record (including tests, medications, and/or procedures), and Obtaining or reviewing history  .          [1]   Social History  Tobacco Use    Smoking status: Never    Smokeless tobacco: Never   Vaping Use    Vaping status: Never Used   Substance and Sexual Activity    Alcohol use: Not Currently    Drug use: Never    Sexual activity: Yes     Partners: Male "

## 2025-06-18 ENCOUNTER — HOSPITAL ENCOUNTER (OUTPATIENT)
Dept: RADIOLOGY | Facility: HOSPITAL | Age: 65
Discharge: HOME | End: 2025-06-18
Payer: COMMERCIAL

## 2025-06-18 DIAGNOSIS — E04.9 THYROID ENLARGEMENT: ICD-10-CM

## 2025-06-18 PROCEDURE — 76536 US EXAM OF HEAD AND NECK: CPT

## 2025-06-26 ENCOUNTER — RESULTS FOLLOW-UP (OUTPATIENT)
Dept: ENDOCRINOLOGY | Facility: CLINIC | Age: 65
End: 2025-06-26

## 2025-06-26 NOTE — RESULT ENCOUNTER NOTE
Please call the patient regarding thyroid ultrasound-please let her know that I am covering for  and have received the report of her thyroid ultrasound-thyroid ultrasound showed multiple small nodules-1 on the right side meets criteria for biopsy.  I can order the biopsy for her however if she wants to schedule a follow-up appointment to discuss further prior to the biopsy she can schedule a follow-up appointment.  Please let me know

## 2025-07-09 DIAGNOSIS — E04.2 MULTIPLE THYROID NODULES: Primary | ICD-10-CM

## 2025-07-15 ENCOUNTER — OFFICE VISIT (OUTPATIENT)
Dept: VASCULAR SURGERY | Facility: CLINIC | Age: 65
End: 2025-07-15
Payer: COMMERCIAL

## 2025-07-15 ENCOUNTER — TELEPHONE (OUTPATIENT)
Dept: VASCULAR SURGERY | Facility: CLINIC | Age: 65
End: 2025-07-15

## 2025-07-15 VITALS
DIASTOLIC BLOOD PRESSURE: 82 MMHG | HEART RATE: 97 BPM | SYSTOLIC BLOOD PRESSURE: 122 MMHG | BODY MASS INDEX: 37.83 KG/M2 | OXYGEN SATURATION: 99 % | WEIGHT: 181 LBS

## 2025-07-15 DIAGNOSIS — I83.813 VARICOSE VEINS OF BOTH LOWER EXTREMITIES WITH PAIN: Primary | ICD-10-CM

## 2025-07-15 PROCEDURE — 99214 OFFICE O/P EST MOD 30 MIN: CPT | Performed by: SURGERY

## 2025-07-15 RX ORDER — GLIPIZIDE 5 MG/1
5 TABLET ORAL
COMMUNITY
Start: 2025-05-12 | End: 2025-07-25 | Stop reason: SDUPTHER

## 2025-07-15 RX ORDER — CEFAZOLIN SODIUM 2 G/50ML
2000 SOLUTION INTRAVENOUS ONCE
OUTPATIENT
Start: 2025-07-15 | End: 2025-07-15

## 2025-07-15 RX ORDER — CHLORHEXIDINE GLUCONATE ORAL RINSE 1.2 MG/ML
15 SOLUTION DENTAL ONCE
OUTPATIENT
Start: 2025-07-15 | End: 2025-07-15

## 2025-07-22 ENCOUNTER — OFFICE VISIT (OUTPATIENT)
Dept: INTERNAL MEDICINE CLINIC | Facility: CLINIC | Age: 65
End: 2025-07-22
Payer: COMMERCIAL

## 2025-07-22 VITALS
TEMPERATURE: 98.2 F | SYSTOLIC BLOOD PRESSURE: 128 MMHG | HEART RATE: 97 BPM | DIASTOLIC BLOOD PRESSURE: 68 MMHG | OXYGEN SATURATION: 97 % | WEIGHT: 179 LBS | BODY MASS INDEX: 37.57 KG/M2 | HEIGHT: 58 IN

## 2025-07-22 DIAGNOSIS — R39.9 UTI SYMPTOMS: Primary | ICD-10-CM

## 2025-07-22 DIAGNOSIS — B37.9 CANDIDA INFECTION: ICD-10-CM

## 2025-07-22 DIAGNOSIS — B34.9 VIRAL INFECTION: ICD-10-CM

## 2025-07-22 LAB
BACTERIA UR QL AUTO: ABNORMAL /HPF
BILIRUB UR QL STRIP: NEGATIVE
CLARITY UR: CLEAR
COLOR UR: COLORLESS
GLUCOSE UR STRIP-MCNC: ABNORMAL MG/DL
HGB UR QL STRIP.AUTO: NEGATIVE
KETONES UR STRIP-MCNC: NEGATIVE MG/DL
LEUKOCYTE ESTERASE UR QL STRIP: ABNORMAL
NITRITE UR QL STRIP: NEGATIVE
NON-SQ EPI CELLS URNS QL MICRO: ABNORMAL /HPF
PH UR STRIP.AUTO: 6.5 [PH]
PROT UR STRIP-MCNC: NEGATIVE MG/DL
RBC #/AREA URNS AUTO: ABNORMAL /HPF
SL AMB  POCT GLUCOSE, UA: NORMAL
SL AMB LEUKOCYTE ESTERASE,UA: NEGATIVE
SL AMB POCT BILIRUBIN,UA: NEGATIVE
SL AMB POCT BLOOD,UA: NEGATIVE
SL AMB POCT CLARITY,UA: CLEAR
SL AMB POCT COLOR,UA: NORMAL
SL AMB POCT KETONES,UA: NEGATIVE
SL AMB POCT NITRITE,UA: NEGATIVE
SL AMB POCT PH,UA: 6
SL AMB POCT SPECIFIC GRAVITY,UA: 1.01
SL AMB POCT URINE PROTEIN: NEGATIVE
SL AMB POCT UROBILINOGEN: NEGATIVE
SP GR UR STRIP.AUTO: 1.02 (ref 1–1.03)
UROBILINOGEN UR STRIP-ACNC: <2 MG/DL
WBC #/AREA URNS AUTO: ABNORMAL /HPF

## 2025-07-22 PROCEDURE — 81002 URINALYSIS NONAUTO W/O SCOPE: CPT | Performed by: INTERNAL MEDICINE

## 2025-07-22 PROCEDURE — 99214 OFFICE O/P EST MOD 30 MIN: CPT | Performed by: INTERNAL MEDICINE

## 2025-07-22 PROCEDURE — 81001 URINALYSIS AUTO W/SCOPE: CPT | Performed by: INTERNAL MEDICINE

## 2025-07-22 PROCEDURE — 87086 URINE CULTURE/COLONY COUNT: CPT | Performed by: INTERNAL MEDICINE

## 2025-07-22 RX ORDER — CLOTRIMAZOLE 1 %
CREAM (GRAM) TOPICAL 2 TIMES DAILY
Qty: 60 G | Refills: 1 | Status: SHIPPED | OUTPATIENT
Start: 2025-07-22 | End: 2025-07-22

## 2025-07-22 RX ORDER — CLOTRIMAZOLE 1 %
CREAM (GRAM) TOPICAL 2 TIMES DAILY
Qty: 60 G | Refills: 1 | Status: SHIPPED | OUTPATIENT
Start: 2025-07-22

## 2025-07-22 RX ORDER — FLUCONAZOLE 100 MG/1
150 TABLET ORAL DAILY
Qty: 2 TABLET | Refills: 0 | Status: SHIPPED | OUTPATIENT
Start: 2025-07-22 | End: 2025-07-24

## 2025-07-22 NOTE — PROGRESS NOTES
Assessment/Plan:      1. UTI symptoms  -     POCT urine dip auto non-scope  2. Candida infection  -     fluconazole (DIFLUCAN) 100 mg tablet; Take 1.5 tablets (150 mg total) by mouth daily for 2 days  -     clotrimazole (LOTRIMIN) 1 % cream; Apply topically at least 3 times a day Please give large tube at least 60 g for vaginal application.  May substitute with vaginal formulation as well.  3. Viral infection  -     CBC and differential  -     Comprehensive metabolic panel  - Take Tylenol as needed for fevers              No problem-specific Assessment & Plan notes found for this encounter.           Subjective:      Patient ID: Michelle Rosa is a 65 y.o. female.    Patient presents for nausea, fevers, increased urinary frequency, groin rash, headaches, dizziness, and right flank pain that has been going on since Thursday. She reports she thought she had a UTI 1 month ago and was prescribed Keflex. She later went to urgent care and was found to have an ear infection and was given a different treatment. The patient started taking her leftover Keflex on Friday and has been taking it every 6 hours since. This has not helped her symptoms. The patient reports burning of her groin rash with urination. She also reports vaginal discharge. She reports her fevers are on and off and have been as high as 100.6. The patient is achy. She reports seeing an endocrinologist approximately 2 months ago who discontinued her glipizide and recommended the patient take insulin with meals and at night and Mounjaro once weekly. The patient has a Dexcom that has been reading 400 (which is the maximum reading it shows). The patient's granddaughter reports the patient's sugars have been in the 300-400's since the patient was hospitalized for a UTI back in September.    Urinary Tract Infection   Associated symptoms include flank pain, frequency and nausea. Pertinent negatives include no chills, hematuria or vomiting.       The following portions  of the patient's history were reviewed and updated as appropriate: She  has a past medical history of Diabetes mellitus (HCC), Hypercholesterolemia, Hypertension, Rheumatoid arthritis (HCC), Sarcoidosis, and Sepsis (HCC) (09/08/2024).  She   Patient Active Problem List    Diagnosis Date Noted    Primary osteoarthritis of left shoulder 04/28/2025    Nontraumatic tear of left supraspinatus tendon 08/30/2024    Rotator cuff arthropathy of left shoulder 07/29/2024    B12 deficiency 04/29/2024    Supraclavicular mass 05/10/2023    Neck swelling 05/10/2023    Primary osteoarthritis of left knee 11/04/2022    Status post left knee replacement 11/04/2022    Rotator cuff tendonitis, left 04/08/2022    Nausea 05/18/2021    Mixed hyperlipidemia 05/18/2021    History of COVID-19 05/18/2021    Essential hypertension, benign 04/21/2021    Primary osteoarthritis of both knees 04/21/2021    Varicose veins of both lower extremities with pain 03/30/2021    Uncontrolled type 2 diabetes mellitus with hyperglycemia (HCC) 03/30/2021    Moderate persistent asthma without complication 12/23/2020    Allergic rhinitis 03/18/2020    Asthma 03/18/2020    Sarcoidosis 03/18/2020    Thalassemia 03/18/2020    Vitamin D deficiency 03/18/2020    Diabetes mellitus (HCC) 03/14/2019    Essential hypertension 03/14/2019     She  has a past surgical history that includes Colonoscopy (05/15/2009); Cholecystectomy; Mammo (historical) (09/30/2016); and Knee surgery (Left, 10/2022).  Her family history includes Coronary artery disease in her father; Diabetes in her maternal grandmother and mother; No Known Problems in her daughter, daughter, daughter, maternal aunt, maternal aunt, maternal aunt, paternal aunt, paternal aunt, paternal grandmother, sister, sister, sister, son, son, and son; Stomach cancer in her maternal grandfather.  She  reports that she has never smoked. She has never used smokeless tobacco. She reports that she does not currently use  alcohol. She reports that she does not use drugs.  Current Outpatient Medications   Medication Sig Dispense Refill    benzonatate (TESSALON PERLES) 100 mg capsule Take 1 capsule (100 mg total) by mouth 3 (three) times a day as needed for cough 20 capsule 0    clotrimazole (LOTRIMIN) 1 % cream Apply topically 2 (two) times a day Please give large tube at least 60 g for vaginal application.  May substitute with vaginal formulation as well. 60 g 1    Continuous Glucose Sensor (Dexcom G7 Sensor) Use 1 Device every 10 days 9 each 3    fluconazole (DIFLUCAN) 100 mg tablet Take 1.5 tablets (150 mg total) by mouth daily for 2 days 2 tablet 0    Insulin Glargine Solostar (Lantus SoloStar) 100 UNIT/ML SOPN Inject 0.3 mL (30 Units total) under the skin daily at bedtime 9 mL 2    losartan-hydrochlorothiazide (HYZAAR) 100-25 MG per tablet Take 1 tablet by mouth daily 90 tablet 1    NovoLOG FlexPen 100 units/mL injection pen Inject 10 units with breakfast and 10 units with dinner 15 mL 1    rosuvastatin (CRESTOR) 10 MG tablet Take 1 tablet (10 mg total) by mouth daily 90 tablet 1    Tirzepatide (Mounjaro) 2.5 MG/0.5ML SOAJ Inject 2.5 mg under the skin once a week 2 mL 2    vitamin B-12 (VITAMIN B-12) 1,000 mcg tablet Take 1 tablet (1,000 mcg total) by mouth daily 90 tablet 1    Alcohol Swabs 70 % PADS May substitute brand based on insurance coverage. Check glucose TID. 100 each 0    glipiZIDE (GLUCOTROL) 5 mg tablet Take 5 mg by mouth       No current facility-administered medications for this visit.     Current Outpatient Medications on File Prior to Visit   Medication Sig    benzonatate (TESSALON PERLES) 100 mg capsule Take 1 capsule (100 mg total) by mouth 3 (three) times a day as needed for cough    Continuous Glucose Sensor (Dexcom G7 Sensor) Use 1 Device every 10 days    Insulin Glargine Solostar (Lantus SoloStar) 100 UNIT/ML SOPN Inject 0.3 mL (30 Units total) under the skin daily at bedtime    losartan-hydrochlorothiazide  "(HYZAAR) 100-25 MG per tablet Take 1 tablet by mouth daily    NovoLOG FlexPen 100 units/mL injection pen Inject 10 units with breakfast and 10 units with dinner    rosuvastatin (CRESTOR) 10 MG tablet Take 1 tablet (10 mg total) by mouth daily    Tirzepatide (Mounjaro) 2.5 MG/0.5ML SOAJ Inject 2.5 mg under the skin once a week    vitamin B-12 (VITAMIN B-12) 1,000 mcg tablet Take 1 tablet (1,000 mcg total) by mouth daily    Alcohol Swabs 70 % PADS May substitute brand based on insurance coverage. Check glucose TID.    glipiZIDE (GLUCOTROL) 5 mg tablet Take 5 mg by mouth     No current facility-administered medications on file prior to visit.     Medications Discontinued During This Encounter   Medication Reason    clotrimazole (LOTRIMIN) 1 % cream       She is allergic to metformin and sulfa antibiotics..    Review of Systems   Constitutional:  Positive for fatigue and fever. Negative for chills.   HENT:  Negative for congestion, ear pain, rhinorrhea and sore throat.    Eyes:  Negative for pain and visual disturbance.   Respiratory:  Negative for cough and shortness of breath.    Cardiovascular:  Negative for chest pain, palpitations and leg swelling.   Gastrointestinal:  Positive for nausea. Negative for abdominal pain, constipation, diarrhea and vomiting.   Endocrine: Positive for polyuria.   Genitourinary:  Positive for flank pain, frequency and vaginal discharge. Negative for dysuria and hematuria.   Musculoskeletal:  Positive for myalgias. Negative for arthralgias and back pain.   Skin:  Positive for rash (groin). Negative for color change.   Neurological:  Positive for dizziness and headaches. Negative for seizures and syncope.   All other systems reviewed and are negative.        Objective:      /68 (BP Location: Right arm, Patient Position: Sitting)   Pulse 97   Temp 98.2 °F (36.8 °C) (Temporal)   Ht 4' 10\" (1.473 m)   Wt 81.2 kg (179 lb)   SpO2 97%   BMI 37.41 kg/m²     Results Reviewed       None "            Recent Results (from the past 8 weeks)   POCT urine dip auto non-scope    Collection Time: 06/16/25  4:23 PM   Result Value Ref Range     COLOR,UA Light yellow     CLARITY,UA Clear     SPECIFIC GRAVITY,UA 1.015      PH,UA 6.0     LEUKOCYTE ESTERASE,UA Negative     NITRITE,UA Negative     GLUCOSE, UA 2+     KETONES,UA Negative     BILIRUBIN,UA Negative     BLOOD,UA Negative     POCT URINE PROTEIN Negative     SL AMB POCT UROBILINOGEN Negative    POCT urine dip auto non-scope    Collection Time: 07/22/25  2:33 PM   Result Value Ref Range     COLOR,UA Light Yellow     CLARITY,UA Clear     SPECIFIC GRAVITY,UA 1.010      PH,UA 6.0     LEUKOCYTE ESTERASE,UA Negative     NITRITE,UA Negative     GLUCOSE, UA 3+     KETONES,UA Negative     BILIRUBIN,UA Negative     BLOOD,UA Negative     POCT URINE PROTEIN Negative     SL AMB POCT UROBILINOGEN Negative         Physical Exam  Vitals reviewed.   Constitutional:       General: She is not in acute distress.     Appearance: Normal appearance. She is obese. She is not toxic-appearing.   HENT:      Head: Normocephalic and atraumatic.      Right Ear: External ear normal.      Left Ear: External ear normal.      Nose: Nose normal.      Mouth/Throat:      Mouth: Mucous membranes are moist.     Eyes:      Extraocular Movements: Extraocular movements intact.      Conjunctiva/sclera: Conjunctivae normal.      Pupils: Pupils are equal, round, and reactive to light.       Cardiovascular:      Rate and Rhythm: Normal rate and regular rhythm.      Pulses: Normal pulses.      Heart sounds: Normal heart sounds. No murmur heard.     No friction rub. No gallop.   Pulmonary:      Effort: Pulmonary effort is normal. No respiratory distress.      Breath sounds: No wheezing, rhonchi or rales.   Abdominal:      General: There is no distension.      Palpations: Abdomen is soft.      Tenderness: There is no abdominal tenderness. There is no right CVA tenderness, left CVA tenderness, guarding  or rebound.     Musculoskeletal:         General: No swelling. Normal range of motion.      Cervical back: Normal range of motion.      Right lower leg: No edema.      Left lower leg: No edema.     Skin:     General: Skin is warm.      Coloration: Skin is not jaundiced.      Findings: No erythema.     Neurological:      General: No focal deficit present.      Mental Status: She is alert and oriented to person, place, and time. Mental status is at baseline.     Psychiatric:         Mood and Affect: Mood normal.         Thought Content: Thought content normal.         Judgment: Judgment normal.           Shirley Howard, PGY-1  2:44 PM

## 2025-07-23 ENCOUNTER — APPOINTMENT (OUTPATIENT)
Dept: LAB | Facility: CLINIC | Age: 65
End: 2025-07-23
Attending: INTERNAL MEDICINE
Payer: COMMERCIAL

## 2025-07-23 ENCOUNTER — RESULTS FOLLOW-UP (OUTPATIENT)
Dept: INTERNAL MEDICINE CLINIC | Facility: CLINIC | Age: 65
End: 2025-07-23

## 2025-07-23 DIAGNOSIS — E78.2 MIXED HYPERLIPIDEMIA: ICD-10-CM

## 2025-07-23 DIAGNOSIS — E11.65 UNCONTROLLED TYPE 2 DIABETES MELLITUS WITH HYPERGLYCEMIA (HCC): ICD-10-CM

## 2025-07-23 DIAGNOSIS — N12 PYELONEPHRITIS: ICD-10-CM

## 2025-07-23 DIAGNOSIS — I10 ESSENTIAL HYPERTENSION: ICD-10-CM

## 2025-07-23 DIAGNOSIS — E53.8 B12 DEFICIENCY: ICD-10-CM

## 2025-07-23 LAB
ALBUMIN SERPL BCG-MCNC: 3.4 G/DL (ref 3.5–5)
ALP SERPL-CCNC: 87 U/L (ref 34–104)
ALT SERPL W P-5'-P-CCNC: 18 U/L (ref 7–52)
ANION GAP SERPL CALCULATED.3IONS-SCNC: 13 MMOL/L (ref 4–13)
AST SERPL W P-5'-P-CCNC: 17 U/L (ref 13–39)
BACTERIA UR CULT: NORMAL
BASOPHILS # BLD AUTO: 0.03 THOUSANDS/ÂΜL (ref 0–0.1)
BASOPHILS NFR BLD AUTO: 0 % (ref 0–1)
BILIRUB SERPL-MCNC: 0.43 MG/DL (ref 0.2–1)
BUN SERPL-MCNC: 13 MG/DL (ref 5–25)
CALCIUM ALBUM COR SERPL-MCNC: 10 MG/DL (ref 8.3–10.1)
CALCIUM SERPL-MCNC: 9.5 MG/DL (ref 8.4–10.2)
CHLORIDE SERPL-SCNC: 92 MMOL/L (ref 96–108)
CHOLEST SERPL-MCNC: 108 MG/DL (ref ?–200)
CO2 SERPL-SCNC: 30 MMOL/L (ref 21–32)
CREAT SERPL-MCNC: 0.59 MG/DL (ref 0.6–1.3)
CREAT UR-MCNC: 53.2 MG/DL
EOSINOPHIL # BLD AUTO: 0.06 THOUSAND/ÂΜL (ref 0–0.61)
EOSINOPHIL NFR BLD AUTO: 1 % (ref 0–6)
ERYTHROCYTE [DISTWIDTH] IN BLOOD BY AUTOMATED COUNT: 12.6 % (ref 11.6–15.1)
EST. AVERAGE GLUCOSE BLD GHB EST-MCNC: 324 MG/DL
GFR SERPL CREATININE-BSD FRML MDRD: 95 ML/MIN/1.73SQ M
GLUCOSE P FAST SERPL-MCNC: 376 MG/DL (ref 65–99)
HBA1C MFR BLD: 12.9 %
HCT VFR BLD AUTO: 37.6 % (ref 34.8–46.1)
HDLC SERPL-MCNC: 24 MG/DL
HGB BLD-MCNC: 12.4 G/DL (ref 11.5–15.4)
IMM GRANULOCYTES # BLD AUTO: 0.05 THOUSAND/UL (ref 0–0.2)
IMM GRANULOCYTES NFR BLD AUTO: 1 % (ref 0–2)
LDLC SERPL CALC-MCNC: 46 MG/DL (ref 0–100)
LYMPHOCYTES # BLD AUTO: 1.54 THOUSANDS/ÂΜL (ref 0.6–4.47)
LYMPHOCYTES NFR BLD AUTO: 22 % (ref 14–44)
MCH RBC QN AUTO: 29.1 PG (ref 26.8–34.3)
MCHC RBC AUTO-ENTMCNC: 33 G/DL (ref 31.4–37.4)
MCV RBC AUTO: 88 FL (ref 82–98)
MICROALBUMIN UR-MCNC: 37.3 MG/L
MICROALBUMIN/CREAT 24H UR: 70 MG/G CREATININE (ref 0–30)
MONOCYTES # BLD AUTO: 0.54 THOUSAND/ÂΜL (ref 0.17–1.22)
MONOCYTES NFR BLD AUTO: 8 % (ref 4–12)
NEUTROPHILS # BLD AUTO: 4.77 THOUSANDS/ÂΜL (ref 1.85–7.62)
NEUTS SEG NFR BLD AUTO: 68 % (ref 43–75)
NRBC BLD AUTO-RTO: 0 /100 WBCS
PLATELET # BLD AUTO: 218 THOUSANDS/UL (ref 149–390)
PMV BLD AUTO: 9 FL (ref 8.9–12.7)
POTASSIUM SERPL-SCNC: 3.6 MMOL/L (ref 3.5–5.3)
PROT SERPL-MCNC: 6.9 G/DL (ref 6.4–8.4)
RBC # BLD AUTO: 4.26 MILLION/UL (ref 3.81–5.12)
SODIUM SERPL-SCNC: 135 MMOL/L (ref 135–147)
TRIGL SERPL-MCNC: 190 MG/DL (ref ?–150)
TSH SERPL DL<=0.05 MIU/L-ACNC: 1.16 UIU/ML (ref 0.45–4.5)
VIT B12 SERPL-MCNC: 803 PG/ML (ref 180–914)
WBC # BLD AUTO: 6.99 THOUSAND/UL (ref 4.31–10.16)

## 2025-07-23 PROCEDURE — 80053 COMPREHEN METABOLIC PANEL: CPT | Performed by: INTERNAL MEDICINE

## 2025-07-23 PROCEDURE — 84443 ASSAY THYROID STIM HORMONE: CPT

## 2025-07-23 PROCEDURE — 85025 COMPLETE CBC W/AUTO DIFF WBC: CPT | Performed by: INTERNAL MEDICINE

## 2025-07-23 PROCEDURE — 82570 ASSAY OF URINE CREATININE: CPT

## 2025-07-23 PROCEDURE — 36415 COLL VENOUS BLD VENIPUNCTURE: CPT | Performed by: INTERNAL MEDICINE

## 2025-07-23 PROCEDURE — 80061 LIPID PANEL: CPT

## 2025-07-23 PROCEDURE — 82043 UR ALBUMIN QUANTITATIVE: CPT

## 2025-07-23 PROCEDURE — 82607 VITAMIN B-12: CPT

## 2025-07-23 PROCEDURE — 83036 HEMOGLOBIN GLYCOSYLATED A1C: CPT

## 2025-07-25 ENCOUNTER — OFFICE VISIT (OUTPATIENT)
Dept: INTERNAL MEDICINE CLINIC | Facility: CLINIC | Age: 65
End: 2025-07-25
Payer: COMMERCIAL

## 2025-07-25 VITALS
SYSTOLIC BLOOD PRESSURE: 126 MMHG | DIASTOLIC BLOOD PRESSURE: 74 MMHG | HEIGHT: 58 IN | BODY MASS INDEX: 37.36 KG/M2 | OXYGEN SATURATION: 97 % | TEMPERATURE: 98.6 F | HEART RATE: 96 BPM | WEIGHT: 178 LBS

## 2025-07-25 DIAGNOSIS — E78.2 MIXED HYPERLIPIDEMIA: ICD-10-CM

## 2025-07-25 DIAGNOSIS — E11.65 UNCONTROLLED TYPE 2 DIABETES MELLITUS WITH HYPERGLYCEMIA (HCC): Primary | ICD-10-CM

## 2025-07-25 DIAGNOSIS — I10 ESSENTIAL HYPERTENSION: ICD-10-CM

## 2025-07-25 DIAGNOSIS — M17.0 PRIMARY OSTEOARTHRITIS OF BOTH KNEES: ICD-10-CM

## 2025-07-25 DIAGNOSIS — M12.812 ROTATOR CUFF ARTHROPATHY OF LEFT SHOULDER: ICD-10-CM

## 2025-07-25 PROCEDURE — G2211 COMPLEX E/M VISIT ADD ON: HCPCS | Performed by: INTERNAL MEDICINE

## 2025-07-25 PROCEDURE — 99214 OFFICE O/P EST MOD 30 MIN: CPT | Performed by: INTERNAL MEDICINE

## 2025-07-25 RX ORDER — GLIPIZIDE 5 MG/1
5 TABLET ORAL DAILY
Qty: 30 TABLET | Refills: 1 | Status: SHIPPED | OUTPATIENT
Start: 2025-07-25

## 2025-07-25 NOTE — PROGRESS NOTES
"Name: Michelle Rosa      : 1960      MRN: [id number]  Encounter Provider: Fausto Jimenez MD  Encounter Date: 2025   Encounter department: [facility] INTERNAL MEDICINE  :  Assessment & Plan  Uncontrolled type 2 diabetes mellitus with hyperglycemia (HCC)  Advised her to increase Lantus to 30 units at bedtime, and NovoLog 12 units twice a day with meal, she will do Mounjaro 2.5 mg once a week alternating with 5 mg of the week advised her to eat small portion more often, start the glipizide 5 mg a day, we will see her back in 4 to 6 weeks.  Lab Results   Component Value Date    HGBA1C 12.9 (H) 2025       Orders:  •  glipiZIDE (GLUCOTROL) 5 mg tablet; Take 1 tablet (5 mg total) by mouth in the morning    Essential hypertension  Continue same med       Primary osteoarthritis of both knees         Rotator cuff arthropathy of left shoulder         Mixed hyperlipidemia  Continue same med              History of Present Illness   Follow-up on blood and urine test done on 2025 test discussed with her      Review of Systems   Constitutional:  Negative for chills and fever.   HENT:  Negative for congestion, ear pain and sore throat.    Eyes:  Negative for pain.   Respiratory:  Negative for cough and shortness of breath.    Cardiovascular:  Negative for chest pain and leg swelling.   Gastrointestinal:  Negative for abdominal pain, nausea and vomiting.   Endocrine: Negative for polyuria.   Genitourinary:  Positive for difficulty urinating. Negative for frequency and urgency.   Musculoskeletal:  Positive for arthralgias. Negative for back pain.   Skin:  Negative for rash.   Neurological:  Negative for weakness and headaches.   Psychiatric/Behavioral:  Negative for sleep disturbance. The patient is not nervous/anxious.        Objective   /74 (BP Location: Right arm, Patient Position: Sitting, Cuff Size: Large)   Pulse 96   Temp 98.6 °F (37 °C) (Temporal)   Ht 4' 10\" (1.473 m)   Wt 80.7 kg " (178 lb)   SpO2 97%   BMI 37.20 kg/m²      Physical Exam  Vitals and nursing note reviewed.   Constitutional:       General: She is not in acute distress.     Appearance: She is well-developed.   HENT:      Head: Normocephalic and atraumatic.      Right Ear: External ear normal.      Left Ear: External ear normal.      Mouth/Throat:      Mouth: Mucous membranes are moist.      Pharynx: Oropharynx is clear.     Eyes:      Extraocular Movements: Extraocular movements intact.      Conjunctiva/sclera: Conjunctivae normal.       Cardiovascular:      Rate and Rhythm: Normal rate and regular rhythm.      Heart sounds: Normal heart sounds. No murmur heard.  Pulmonary:      Effort: Pulmonary effort is normal. No respiratory distress.      Breath sounds: Normal breath sounds. No wheezing or rales.   Abdominal:      General: Abdomen is flat. There is no distension.      Palpations: Abdomen is soft.      Tenderness: There is no abdominal tenderness.     Musculoskeletal:         General: No swelling.      Cervical back: Neck supple.      Right lower leg: No edema.      Left lower leg: No edema.     Skin:     General: Skin is warm and dry.      Capillary Refill: Capillary refill takes less than 2 seconds.     Neurological:      General: No focal deficit present.      Mental Status: She is alert and oriented to person, place, and time.     Psychiatric:         Mood and Affect: Mood normal.

## 2025-07-28 ENCOUNTER — PREP FOR PROCEDURE (OUTPATIENT)
Dept: VASCULAR SURGERY | Facility: CLINIC | Age: 65
End: 2025-07-28

## 2025-07-28 DIAGNOSIS — I83.813 VARICOSE VEINS OF BOTH LOWER EXTREMITIES WITH PAIN: Primary | ICD-10-CM

## 2025-07-28 DIAGNOSIS — I83.819 VARICOSE VEINS WITH PAIN: ICD-10-CM

## 2025-07-28 NOTE — TELEPHONE ENCOUNTER
Verified patient's insurance   CONFIRMED - Patient's insurance is [employer] Replacement  Is patient requesting a call when authorization has been obtained? Patient would like to be called once an update is available.    Surgery Date: 11/3/25  Primary Surgeon: BOB Blake/ Hola Wilkerson (NPI: [id number])  Assisting Surgeon: Not Applicable (N/A)  Facility: [facility] (Tax: [id number] / NPI: [id number])  Inpatient / Outpatient: Outpatient  Level: 4    Clearance Received: No clearance ordered.  Consent Received: Yes, scanned into Epic on 7/15/25.  Medication Hold / Last Dose: Not Applicable (N/A)  IR Notified: Not Applicable (N/A)  Rep. Notified: Not Applicable (N/A)  Equipment Needs: Not Applicable (N/A)  [facility] Requested: 7/28/25  Patient Contacted: 7/25/25    Lyft Ride: NO  Pickup Date/Time: Not Applicable (N/A)    Diagnosis: I83.813  Procedure/ CPT Code(s): (EVLT) Endovenous Laser Treatment WITH Stab Phlebectomies of the right upper leg // CPT: 45173, 33392     For varicose vein related procedures:   Last LEVDR: 5/6/25, patient's LEVDR was completed within 12-months of their procedure date.  CEAP Classification: 2  VCSS: 10  PICTURES: 7/15/25    Post Operative Date/ Time: 11/6/25 , 2pm [place] with Deya Minor (NPI: [id number])     *Please review medication hold(s), PATs, and check H&P with patient.*  PATIENT WAS MAILED SURGERY/SHOWERING/DISCHARGE/COVID INSTRUCTIONS AFTER REVIEWING WITH THEM VIA PHONE CALL.

## 2025-07-31 ENCOUNTER — HOSPITAL ENCOUNTER (OUTPATIENT)
Dept: RADIOLOGY | Facility: HOSPITAL | Age: 65
Discharge: HOME | End: 2025-07-31
Attending: INTERNAL MEDICINE
Payer: COMMERCIAL

## 2025-07-31 DIAGNOSIS — E04.2 MULTIPLE THYROID NODULES: ICD-10-CM

## 2025-07-31 PROCEDURE — 88173 CYTOPATH EVAL FNA REPORT: CPT | Performed by: STUDENT IN AN ORGANIZED HEALTH CARE EDUCATION/TRAINING PROGRAM

## 2025-07-31 PROCEDURE — 10005 FNA BX W/US GDN 1ST LES: CPT

## 2025-07-31 RX ORDER — LIDOCAINE HYDROCHLORIDE 10 MG/ML
5 INJECTION, SOLUTION EPIDURAL; INFILTRATION; INTRACAUDAL; PERINEURAL ONCE
Status: COMPLETED | OUTPATIENT
Start: 2025-07-31 | End: 2025-07-31

## 2025-07-31 RX ADMIN — LIDOCAINE HYDROCHLORIDE 3 ML: 10 INJECTION, SOLUTION EPIDURAL; INFILTRATION; INTRACAUDAL; PERINEURAL at 09:21

## 2025-08-01 PROCEDURE — 88173 CYTOPATH EVAL FNA REPORT: CPT | Performed by: STUDENT IN AN ORGANIZED HEALTH CARE EDUCATION/TRAINING PROGRAM

## 2025-08-03 ENCOUNTER — RESULTS FOLLOW-UP (OUTPATIENT)
Dept: ENDOCRINOLOGY | Facility: CLINIC | Age: 65
End: 2025-08-03

## 2025-08-16 DIAGNOSIS — E11.65 UNCONTROLLED TYPE 2 DIABETES MELLITUS WITH HYPERGLYCEMIA (HCC): ICD-10-CM

## 2025-08-19 ENCOUNTER — TELEPHONE (OUTPATIENT)
Age: 65
End: 2025-08-19

## 2025-08-21 ENCOUNTER — OFFICE VISIT (OUTPATIENT)
Dept: INTERNAL MEDICINE CLINIC | Facility: CLINIC | Age: 65
End: 2025-08-21

## 2025-08-21 VITALS
HEIGHT: 58 IN | WEIGHT: 180 LBS | DIASTOLIC BLOOD PRESSURE: 76 MMHG | BODY MASS INDEX: 37.79 KG/M2 | HEART RATE: 89 BPM | OXYGEN SATURATION: 98 % | TEMPERATURE: 98.3 F | SYSTOLIC BLOOD PRESSURE: 126 MMHG

## 2025-08-21 DIAGNOSIS — E11.65 UNCONTROLLED TYPE 2 DIABETES MELLITUS WITH HYPERGLYCEMIA (HCC): Primary | ICD-10-CM

## 2025-08-21 DIAGNOSIS — Z00.00 WELCOME TO MEDICARE PREVENTIVE VISIT: ICD-10-CM

## 2025-08-21 DIAGNOSIS — M12.812 ROTATOR CUFF ARTHROPATHY OF LEFT SHOULDER: ICD-10-CM

## 2025-08-21 DIAGNOSIS — I10 ESSENTIAL HYPERTENSION: ICD-10-CM

## 2025-08-21 DIAGNOSIS — E78.2 MIXED HYPERLIPIDEMIA: ICD-10-CM

## 2025-08-21 DIAGNOSIS — M17.0 PRIMARY OSTEOARTHRITIS OF BOTH KNEES: ICD-10-CM
